# Patient Record
Sex: MALE | Race: WHITE | NOT HISPANIC OR LATINO | Employment: OTHER | URBAN - METROPOLITAN AREA
[De-identification: names, ages, dates, MRNs, and addresses within clinical notes are randomized per-mention and may not be internally consistent; named-entity substitution may affect disease eponyms.]

---

## 2024-03-08 ENCOUNTER — APPOINTMENT (OUTPATIENT)
Dept: MRI IMAGING | Facility: CLINIC | Age: 49
End: 2024-03-08
Attending: PHYSICIAN ASSISTANT
Payer: COMMERCIAL

## 2024-03-08 ENCOUNTER — APPOINTMENT (OUTPATIENT)
Dept: CT IMAGING | Facility: CLINIC | Age: 49
End: 2024-03-08
Attending: EMERGENCY MEDICINE
Payer: COMMERCIAL

## 2024-03-08 ENCOUNTER — APPOINTMENT (OUTPATIENT)
Dept: ULTRASOUND IMAGING | Facility: CLINIC | Age: 49
End: 2024-03-08
Attending: PHYSICIAN ASSISTANT
Payer: COMMERCIAL

## 2024-03-08 ENCOUNTER — HOSPITAL ENCOUNTER (EMERGENCY)
Facility: CLINIC | Age: 49
Discharge: HOME | End: 2024-03-08
Attending: EMERGENCY MEDICINE | Admitting: INTERNAL MEDICINE
Payer: COMMERCIAL

## 2024-03-08 VITALS
RESPIRATION RATE: 18 BRPM | DIASTOLIC BLOOD PRESSURE: 95 MMHG | TEMPERATURE: 97.8 F | HEART RATE: 84 BPM | OXYGEN SATURATION: 96 % | SYSTOLIC BLOOD PRESSURE: 147 MMHG

## 2024-03-08 DIAGNOSIS — E83.42 HYPOMAGNESEMIA: ICD-10-CM

## 2024-03-08 DIAGNOSIS — S06.0X0A CONCUSSION WITHOUT LOSS OF CONSCIOUSNESS, INITIAL ENCOUNTER: ICD-10-CM

## 2024-03-08 DIAGNOSIS — R44.3 HALLUCINATIONS: Primary | ICD-10-CM

## 2024-03-08 DIAGNOSIS — E87.6 HYPOKALEMIA: ICD-10-CM

## 2024-03-08 DIAGNOSIS — F10.11 HISTORY OF ALCOHOL ABUSE: ICD-10-CM

## 2024-03-08 PROBLEM — F43.20 ADJUSTMENT REACTION: Status: ACTIVE | Noted: 2024-03-08

## 2024-03-08 PROBLEM — F10.239 ALCOHOL DEPENDENCE WITH WITHDRAWAL WITH COMPLICATION (H): Status: ACTIVE | Noted: 2024-03-08

## 2024-03-08 LAB
ALBUMIN SERPL BCG-MCNC: 4.4 G/DL (ref 3.5–5.2)
ALBUMIN UR-MCNC: 50 MG/DL
ALP SERPL-CCNC: 143 U/L (ref 40–150)
ALT SERPL W P-5'-P-CCNC: 41 U/L (ref 0–70)
AMMONIA PLAS-SCNC: 43 UMOL/L (ref 16–60)
AMPHETAMINES UR QL SCN: ABNORMAL
ANION GAP SERPL CALCULATED.3IONS-SCNC: 12 MMOL/L (ref 7–15)
ANION GAP SERPL CALCULATED.3IONS-SCNC: 15 MMOL/L (ref 7–15)
APAP SERPL-MCNC: <5 UG/ML (ref 10–30)
APPEARANCE UR: CLEAR
AST SERPL W P-5'-P-CCNC: 145 U/L (ref 0–45)
ATRIAL RATE - MUSE: 97 BPM
BARBITURATES UR QL SCN: ABNORMAL
BASOPHILS # BLD AUTO: 0 10E3/UL (ref 0–0.2)
BASOPHILS NFR BLD AUTO: 1 %
BENZODIAZ UR QL SCN: ABNORMAL
BILIRUB SERPL-MCNC: 3.2 MG/DL
BILIRUB UR QL STRIP: NEGATIVE
BUN SERPL-MCNC: 7.7 MG/DL (ref 6–20)
BUN SERPL-MCNC: 9.8 MG/DL (ref 6–20)
BZE UR QL SCN: ABNORMAL
CALCIUM SERPL-MCNC: 10 MG/DL (ref 8.6–10)
CALCIUM SERPL-MCNC: 9.6 MG/DL (ref 8.6–10)
CANNABINOIDS UR QL SCN: ABNORMAL
CHLORIDE SERPL-SCNC: 89 MMOL/L (ref 98–107)
CHLORIDE SERPL-SCNC: 95 MMOL/L (ref 98–107)
CK SERPL-CCNC: 231 U/L (ref 39–308)
COLOR UR AUTO: YELLOW
CREAT SERPL-MCNC: 0.69 MG/DL (ref 0.67–1.17)
CREAT SERPL-MCNC: 0.7 MG/DL (ref 0.67–1.17)
DEPRECATED HCO3 PLAS-SCNC: 30 MMOL/L (ref 22–29)
DEPRECATED HCO3 PLAS-SCNC: 31 MMOL/L (ref 22–29)
DIASTOLIC BLOOD PRESSURE - MUSE: NORMAL MMHG
EGFRCR SERPLBLD CKD-EPI 2021: >90 ML/MIN/1.73M2
EGFRCR SERPLBLD CKD-EPI 2021: >90 ML/MIN/1.73M2
EOSINOPHIL # BLD AUTO: 0 10E3/UL (ref 0–0.7)
EOSINOPHIL NFR BLD AUTO: 0 %
ERYTHROCYTE [DISTWIDTH] IN BLOOD BY AUTOMATED COUNT: 11.4 % (ref 10–15)
ETHANOL SERPL-MCNC: <0.01 G/DL
FENTANYL UR QL: ABNORMAL
FOLATE SERPL-MCNC: 21.7 NG/ML (ref 4.6–34.8)
GLUCOSE SERPL-MCNC: 120 MG/DL (ref 70–99)
GLUCOSE SERPL-MCNC: 92 MG/DL (ref 70–99)
GLUCOSE UR STRIP-MCNC: NEGATIVE MG/DL
HCT VFR BLD AUTO: 39.6 % (ref 40–53)
HGB BLD-MCNC: 14.3 G/DL (ref 13.3–17.7)
HGB UR QL STRIP: ABNORMAL
IMM GRANULOCYTES # BLD: 0 10E3/UL
IMM GRANULOCYTES NFR BLD: 1 %
INTERPRETATION ECG - MUSE: NORMAL
KETONES UR STRIP-MCNC: NEGATIVE MG/DL
LEUKOCYTE ESTERASE UR QL STRIP: NEGATIVE
LYMPHOCYTES # BLD AUTO: 0.5 10E3/UL (ref 0.8–5.3)
LYMPHOCYTES NFR BLD AUTO: 9 %
MAGNESIUM SERPL-MCNC: 1.5 MG/DL (ref 1.7–2.3)
MAGNESIUM SERPL-MCNC: 1.7 MG/DL (ref 1.7–2.3)
MCH RBC QN AUTO: 36 PG (ref 26.5–33)
MCHC RBC AUTO-ENTMCNC: 36.1 G/DL (ref 31.5–36.5)
MCV RBC AUTO: 100 FL (ref 78–100)
MONOCYTES # BLD AUTO: 0.7 10E3/UL (ref 0–1.3)
MONOCYTES NFR BLD AUTO: 12 %
NEUTROPHILS # BLD AUTO: 4.5 10E3/UL (ref 1.6–8.3)
NEUTROPHILS NFR BLD AUTO: 77 %
NITRATE UR QL: NEGATIVE
NRBC # BLD AUTO: 0 10E3/UL
NRBC BLD AUTO-RTO: 0 /100
OPIATES UR QL SCN: ABNORMAL
P AXIS - MUSE: 37 DEGREES
PCP QUAL URINE (ROCHE): ABNORMAL
PH UR STRIP: 8 [PH] (ref 5–7)
PHOSPHATE SERPL-MCNC: 3.1 MG/DL (ref 2.5–4.5)
PLATELET # BLD AUTO: 137 10E3/UL (ref 150–450)
POTASSIUM SERPL-SCNC: 3 MMOL/L (ref 3.4–5.3)
POTASSIUM SERPL-SCNC: 3.4 MMOL/L (ref 3.4–5.3)
PR INTERVAL - MUSE: 178 MS
PROT SERPL-MCNC: 8.6 G/DL (ref 6.4–8.3)
QRS DURATION - MUSE: 100 MS
QT - MUSE: 404 MS
QTC - MUSE: 513 MS
R AXIS - MUSE: -9 DEGREES
RBC # BLD AUTO: 3.97 10E6/UL (ref 4.4–5.9)
RBC URINE: 3 /HPF
SALICYLATES SERPL-MCNC: <0.3 MG/DL
SODIUM SERPL-SCNC: 134 MMOL/L (ref 135–145)
SODIUM SERPL-SCNC: 138 MMOL/L (ref 135–145)
SP GR UR STRIP: 1.01 (ref 1–1.03)
SYSTOLIC BLOOD PRESSURE - MUSE: NORMAL MMHG
T AXIS - MUSE: 27 DEGREES
T4 FREE SERPL-MCNC: 1.28 NG/DL (ref 0.9–1.7)
TROPONIN T SERPL HS-MCNC: <6 NG/L
TSH SERPL DL<=0.005 MIU/L-ACNC: 5.67 UIU/ML (ref 0.3–4.2)
UROBILINOGEN UR STRIP-MCNC: 2 MG/DL
VENTRICULAR RATE- MUSE: 97 BPM
WBC # BLD AUTO: 5.8 10E3/UL (ref 4–11)
WBC URINE: <1 /HPF

## 2024-03-08 PROCEDURE — 250N000009 HC RX 250: Performed by: EMERGENCY MEDICINE

## 2024-03-08 PROCEDURE — 96375 TX/PRO/DX INJ NEW DRUG ADDON: CPT

## 2024-03-08 PROCEDURE — A9585 GADOBUTROL INJECTION: HCPCS | Performed by: EMERGENCY MEDICINE

## 2024-03-08 PROCEDURE — 250N000011 HC RX IP 250 OP 636: Performed by: PHYSICIAN ASSISTANT

## 2024-03-08 PROCEDURE — 80053 COMPREHEN METABOLIC PANEL: CPT | Performed by: EMERGENCY MEDICINE

## 2024-03-08 PROCEDURE — 36415 COLL VENOUS BLD VENIPUNCTURE: CPT | Performed by: EMERGENCY MEDICINE

## 2024-03-08 PROCEDURE — 82077 ASSAY SPEC XCP UR&BREATH IA: CPT | Performed by: EMERGENCY MEDICINE

## 2024-03-08 PROCEDURE — 96367 TX/PROPH/DG ADDL SEQ IV INF: CPT

## 2024-03-08 PROCEDURE — 84425 ASSAY OF VITAMIN B-1: CPT | Performed by: PHYSICIAN ASSISTANT

## 2024-03-08 PROCEDURE — 96361 HYDRATE IV INFUSION ADD-ON: CPT

## 2024-03-08 PROCEDURE — 80143 DRUG ASSAY ACETAMINOPHEN: CPT | Performed by: EMERGENCY MEDICINE

## 2024-03-08 PROCEDURE — 83735 ASSAY OF MAGNESIUM: CPT | Performed by: EMERGENCY MEDICINE

## 2024-03-08 PROCEDURE — 82550 ASSAY OF CK (CPK): CPT | Performed by: EMERGENCY MEDICINE

## 2024-03-08 PROCEDURE — 258N000003 HC RX IP 258 OP 636: Performed by: PHYSICIAN ASSISTANT

## 2024-03-08 PROCEDURE — 85025 COMPLETE CBC W/AUTO DIFF WBC: CPT | Performed by: EMERGENCY MEDICINE

## 2024-03-08 PROCEDURE — 83735 ASSAY OF MAGNESIUM: CPT | Performed by: PHYSICIAN ASSISTANT

## 2024-03-08 PROCEDURE — 80048 BASIC METABOLIC PNL TOTAL CA: CPT | Performed by: PHYSICIAN ASSISTANT

## 2024-03-08 PROCEDURE — 250N000011 HC RX IP 250 OP 636: Performed by: EMERGENCY MEDICINE

## 2024-03-08 PROCEDURE — 96366 THER/PROPH/DIAG IV INF ADDON: CPT

## 2024-03-08 PROCEDURE — 84443 ASSAY THYROID STIM HORMONE: CPT | Performed by: EMERGENCY MEDICINE

## 2024-03-08 PROCEDURE — 80307 DRUG TEST PRSMV CHEM ANLYZR: CPT | Performed by: EMERGENCY MEDICINE

## 2024-03-08 PROCEDURE — 82607 VITAMIN B-12: CPT | Performed by: PHYSICIAN ASSISTANT

## 2024-03-08 PROCEDURE — 80179 DRUG ASSAY SALICYLATE: CPT | Performed by: EMERGENCY MEDICINE

## 2024-03-08 PROCEDURE — 255N000002 HC RX 255 OP 636: Performed by: EMERGENCY MEDICINE

## 2024-03-08 PROCEDURE — 76705 ECHO EXAM OF ABDOMEN: CPT

## 2024-03-08 PROCEDURE — 99223 1ST HOSP IP/OBS HIGH 75: CPT | Mod: AI | Performed by: PHYSICIAN ASSISTANT

## 2024-03-08 PROCEDURE — 96365 THER/PROPH/DIAG IV INF INIT: CPT | Mod: 59

## 2024-03-08 PROCEDURE — 99285 EMERGENCY DEPT VISIT HI MDM: CPT | Mod: 25

## 2024-03-08 PROCEDURE — 84439 ASSAY OF FREE THYROXINE: CPT | Performed by: EMERGENCY MEDICINE

## 2024-03-08 PROCEDURE — 93005 ELECTROCARDIOGRAM TRACING: CPT | Mod: RTG

## 2024-03-08 PROCEDURE — 250N000013 HC RX MED GY IP 250 OP 250 PS 637: Performed by: EMERGENCY MEDICINE

## 2024-03-08 PROCEDURE — 70450 CT HEAD/BRAIN W/O DYE: CPT

## 2024-03-08 PROCEDURE — 70553 MRI BRAIN STEM W/O & W/DYE: CPT

## 2024-03-08 PROCEDURE — 81001 URINALYSIS AUTO W/SCOPE: CPT | Performed by: EMERGENCY MEDICINE

## 2024-03-08 PROCEDURE — 258N000003 HC RX IP 258 OP 636: Performed by: EMERGENCY MEDICINE

## 2024-03-08 PROCEDURE — 84100 ASSAY OF PHOSPHORUS: CPT | Performed by: PHYSICIAN ASSISTANT

## 2024-03-08 PROCEDURE — 82746 ASSAY OF FOLIC ACID SERUM: CPT | Performed by: PHYSICIAN ASSISTANT

## 2024-03-08 PROCEDURE — 82140 ASSAY OF AMMONIA: CPT | Performed by: PHYSICIAN ASSISTANT

## 2024-03-08 PROCEDURE — 36415 COLL VENOUS BLD VENIPUNCTURE: CPT | Performed by: PHYSICIAN ASSISTANT

## 2024-03-08 PROCEDURE — 84484 ASSAY OF TROPONIN QUANT: CPT | Performed by: EMERGENCY MEDICINE

## 2024-03-08 RX ORDER — THIAMINE HYDROCHLORIDE 100 MG/ML
100 INJECTION, SOLUTION INTRAMUSCULAR; INTRAVENOUS ONCE
Status: COMPLETED | OUTPATIENT
Start: 2024-03-08 | End: 2024-03-08

## 2024-03-08 RX ORDER — GADOBUTROL 604.72 MG/ML
7.5 INJECTION INTRAVENOUS ONCE
Status: COMPLETED | OUTPATIENT
Start: 2024-03-08 | End: 2024-03-08

## 2024-03-08 RX ORDER — MAGNESIUM SULFATE HEPTAHYDRATE 40 MG/ML
2 INJECTION, SOLUTION INTRAVENOUS ONCE
Status: COMPLETED | OUTPATIENT
Start: 2024-03-08 | End: 2024-03-08

## 2024-03-08 RX ORDER — POTASSIUM CHLORIDE 1.5 G/1.58G
40 POWDER, FOR SOLUTION ORAL ONCE
Status: COMPLETED | OUTPATIENT
Start: 2024-03-08 | End: 2024-03-08

## 2024-03-08 RX ADMIN — POTASSIUM CHLORIDE 40 MEQ: 1.5 POWDER, FOR SOLUTION ORAL at 09:38

## 2024-03-08 RX ADMIN — SODIUM CHLORIDE 1000 ML: 9 INJECTION, SOLUTION INTRAVENOUS at 18:00

## 2024-03-08 RX ADMIN — FOLIC ACID: 5 INJECTION, SOLUTION INTRAMUSCULAR; INTRAVENOUS; SUBCUTANEOUS at 08:52

## 2024-03-08 RX ADMIN — MAGNESIUM SULFATE HEPTAHYDRATE 2 G: 2 INJECTION, SOLUTION INTRAVENOUS at 09:38

## 2024-03-08 RX ADMIN — GADOBUTROL 7.5 ML: 604.72 INJECTION INTRAVENOUS at 17:02

## 2024-03-08 RX ADMIN — THIAMINE HYDROCHLORIDE 100 MG: 100 INJECTION, SOLUTION INTRAMUSCULAR; INTRAVENOUS at 16:04

## 2024-03-08 ASSESSMENT — ACTIVITIES OF DAILY LIVING (ADL)
ADLS_ACUITY_SCORE: 35
ADLS_ACUITY_SCORE: 33
ADLS_ACUITY_SCORE: 35
ADLS_ACUITY_SCORE: 33
ADLS_ACUITY_SCORE: 35

## 2024-03-08 ASSESSMENT — ENCOUNTER SYMPTOMS
BACK PAIN: 0
DYSURIA: 0
RHINORRHEA: 0
HALLUCINATIONS: 1
NECK PAIN: 0
NUMBNESS: 0
PHOTOPHOBIA: 0
CHILLS: 0
DIZZINESS: 0
WEAKNESS: 0
VOMITING: 0
CONFUSION: 1
COUGH: 0
FEVER: 0
HEADACHES: 0
NAUSEA: 0
ABDOMINAL PAIN: 0
HEMATURIA: 0
SHORTNESS OF BREATH: 0
NECK STIFFNESS: 0

## 2024-03-08 NOTE — CONSULTS
Diagnostic Evaluation Consultation  Crisis Assessment    Patient Name: Carlos A Lopez  Age:  48 year old  Legal Sex: male  Gender Identity: male  Pronouns: he/him  Race: White  Ethnicity: Not  or   Language: English      Patient was assessed: Virtual: [employer] Crisis Assessment Start Time: 1313 Crisis Assessment Stop Time: 1353  Patient location: [facility] EMERGENCY DEPT                             ED05    Referral Data and Chief Complaint  Carlos A Lopez presents to the ED with family/friends. Patient is presenting to the ED for the following concerns:  (hallucinations following stopping alcohol).       Factors that make the mental health crisis life threatening or complex are:  The patient reports that he stopped drinking a few days ago. He reports drinking a liter of alcohol every few days for the past many years. Also, a few days ago, he reports falling while moving a  and hitting his head. Over the past couple of days he's experienced hallucinations of seeing and feeling water that wasn't there, seeing people that weren't there, hearing things that weren't there. He's had trouble sleeping, had sweating, shaking, and trouble with memory.       Currently, the patient is alert and mostly oriented. He is calm and engaged in assessment. He is having some challenges with memory. He is somewhat tangential as he adds information unrelated to the questions asked. He is still experiencing visual halluncations. He states he has had some trouble with his memory in the past few months.    Informed Consent and Assessment Methods  Explained the crisis assessment process, including applicable information disclosures and limits to confidentiality, assessed understanding of the process, and obtained consent to proceed with the assessment.  Assessment methods included conducting a formal interview with patient, review of medical records, collaboration with medical staff, and obtaining  relevant collateral information from family and community providers when available: done     Patient response to interventions: acceptance expressed  Coping skills were attempted to reduce the crisis: came to ED     History of the Crisis   Patient s wife reports the patient has no history of mental illness. Over the past few months she has noticed trouble with his short term memory, repeating stories, asking more questions, more fatigue, more sleeping, very little appetite, and weight loss.     Brief Psychosocial History  Family:  , Children yes (ages 16 and 18)  Support System:  Wife  Employment Status:  employed full-time  Source of Income:  salary/wages  Financial Environmental Concerns:  none  Current Hobbies:  family functions, sports/team sports  Barriers in Personal Life: alcohol use     Significant Clinical History  Current Anxiety Symptoms:  anxious  Current Depression/Trauma:     Current Somatic Symptoms:  sweating, flushing, shaking, anxious  Current Psychosis/Thought Disturbance:     Current Eating Symptoms:  loss of appetite  Chemical Use History:  Alcohol: Daily  Last Use:: 03/05/24  Benzodiazepines: None  Opiates: None  Cocaine: None  Marijuana: Occasional  Last Use:: 03/06/24  Other Use: None  Withdrawal Symptoms: Tremors   Past diagnosis:  No known past diagnosis  Family history:  No known history of mental health or chemical health concerns  Past treatment:  No known formal treatment attempts  Details of most recent treatment:  No past mental or substance use health treatment     Collateral Information  Is there collateral information: Yes     Collateral information name, relationship, phone number:  Spoke with patient's wife, Nelda, [phone], with patient present. Patient s wife reports the patient has no history of mental illness. Over the past few months she has noticed trouble with his short term memory, repeating stories, asking more questions, more fatigue, more sleeping, very  little appetite, and weight loss. Wife states patient has no history of harm to himself or others.    Risk Assessment  Harrison Suicide Severity Rating Scale Full Clinical Version:  Suicidal Ideation  Q1 Wish to be Dead (Lifetime): No  Q2 Non-Specific Active Suicidal Thoughts (Lifetime): No  Q6 Suicide Behavior (Lifetime): no     Suicidal Behavior (Lifetime)  Actual Attempt (Lifetime): No    Harrison Suicide Severity Rating Scale Recent:   Suicidal Ideation (Recent)  Q1 Wished to be Dead (Past Month): no  Q2 Suicidal Thoughts (Past Month): no  Level of Risk per Screen: no risks indicated     Suicidal Behavior (Recent)  Actual Attempt (Past 3 Months): No    Environmental or Psychosocial Events: ongoing abuse of substances  Protective Factors: Protective Factors: strong bond to family unit, community support, or employment, intact marriage or domestic partnership, responsibilities and duties to others, including pets and children, lives in a responsibly safe and stable environment    Does the patient have thoughts of harming others? Feels Like Hurting Others: no  Previous Attempt to Hurt Others: no  Is the patient engaging in sexually inappropriate behavior?: no    Is the patient engaging in sexually inappropriate behavior?  no        Mental Status Exam   Affect: Appropriate  Appearance: Appropriate  Attention Span/Concentration: Attentive  Eye Contact: Engaged    Fund of Knowledge: Appropriate   Language /Speech Content: Fluent  Language /Speech Volume: Normal  Language /Speech Rate/Productions: Normal  Recent Memory: Variable  Remote Memory: Variable  Mood: Normal  Orientation to Person: Yes   Orientation to Place: Yes  Orientation to Time of Day: Yes  Orientation to Date: Yes     Situation (Do they understand why they are here?): Yes  Psychomotor Behavior: Normal  Thought Content: Hallucinations  Thought Form: Tangential     Medication  Psychotropic medications: none     Current Care Team  Patient Care Team:  No  Ref-Primary, Physician as PCP - General    Diagnosis  Patient Active Problem List   Diagnosis Code    Alcohol dependence with withdrawal with complication (H) F10.239    Adjustment reaction F43.20       Primary Problem This Admission  Active Hospital Problems    *F32.20 Adjustment reaction     F10.239 Alcohol dependence with withdrawal with complication (H)      Clinical Summary and Substantiation of Recommendations   Patient s wife reports the patient has no history of mental illness or cognitive issues and over the past few months she has noticed trouble with his short term memory, repeating stories, asking more questions, more fatigue, more sleeping, very little appetite, and weight loss. The patient stopped drinking a few days ago (was drinking a liter of hard alcohol over a few days). In the past couple days he has been experiencing hallucinations, trouble sleeping, and cognitive problems. He is currently tangential and answering questions somewhat and then adding information that is not relevant to the question asked. MD recommends that patient be medically admitted for withdrawal and cognitive evaluation. Patient was also scheduled with a psychiatrist on 12 March 2024 for medication management and substance use treatment.      Patient coping skills attempted to reduce the crisis:   came to ED    Disposition  Recommended disposition: Medical Admission        Reviewed case and recommendations with attending provider. Attending Name: MD Lavell       Attending concurs with disposition: yes       Patient and/or validated legal guardian concurs with disposition: yes       Final disposition:  medical    Assessment Details   Total duration spent with the patient: 40 min     CPT code(s) utilized: 80603 - Psychotherapy for Crisis - 60 (30-74*) min    PALMIRA Long   [facility] - Triage & Transition Services   Callback: [phone]

## 2024-03-08 NOTE — ED TRIAGE NOTES
Pt to ER with c/o altered mental status, pt has had some edibles last night but also may have hit his head a coiuple of days ago, wife states that pt is hearing voices

## 2024-03-08 NOTE — CONSULTS
[facility]  Internal Medicine Consult   NAME: Carlos A Lopez   : 1975   MRN: [id number]     Date of Admission:  3/8/2024    Assessment & Plan   Carlos A Lopez is a 48 year old male without previous hx of mental health, hx of ETOH dependence vs abuse, hx of colon ca s/p chemo and resection and mild asthma who presents with wife due to concerns of confusion, paranoia, with visual and auditory hallucinations for the past 24-36 hrs.   Pt states he drinks about 15 drinks a week though wife thinks its more. Does have a hx of DUI 3 years ago. States last etoh drink was sun night. On wed he was getting a  out of his trunk when he lost balance and fell on his buttock then hitting his head on the driveway from the flat bed of the truck. He had a bit of HA but o/w no other sxs. He did take THC gummies on wed and thurs night for sleep. He didn't know what kind he took wed but states last night he took a 5mg THC gummi. Yesterday, wife states he called her b/c he believe that the ceiling was going to come down due to water leak when there was not a leak, then he saw people behind the curtain and heard them talking when no one was there. Due to on-going confusion his wife brought him to the ED.      Initial work up in the ED shows normal CT head, labs showed hypokalemia and hyponatremia and hypomagnesemia. LFTs were elevated in pattern c/w chronic etoh use. Ammonia level was negative. Normal TSH. CBC unremarkable.   Urine was concentrated but no obvious sxs of infection. Observation admission was requested but pt was very much reluctant to stay. We talked about potential possibilities as the cause of his hallucination and even paranoid thinking. I told him that I would consider checking ammonia, B12/folate/thiamine, get an MRI of the brain and RUQ US given LFT abnormality. I told him that I could try to accomplish this and if all is negative or nothing too alarming that I will let him go home.  He was agreeable with that plan.     #Acute episodes of visual and auditory hallucinations  #Episodes of delusion/paranoia   Pt and family denies hx of MH issues but father did have hx of ETOH abuse.   So far work up has reassuring. Only mild electrolyte derangements but currently not replaced.  No significant infectious etiology and negative CT head imaging as well as MRI. I suspect his sxs are due to recent etoh w/d and perhaps dTs combined with recent closed head injury from fall and +/- effects of  THC.   -  Discussed results of labs and emphasized need for etoh reduction/cessation and not using any substance during this period.   -  [facility] also [facility] as well as a PCP follow up in 1 week and can assess if Neurology referral was needed.     #ETOH dependence vs abuse  #Abnl LFTs   #Nodular liver concerning for cirrhosis   -  Pt denies any hx of w/d and w/d seizures or dT. Father has hx of ETOH abuse.   -  Did get a DUI 3 yrs ago  - Labs babar hepatic panel c/w chronic etoh use  - fortunately ammonia level normal so r/o'd hepatic encephalopathy   - US of the liver shows  nodular appearing c/w cirrhosis   - D/w pt, and he needs absolute etoh cessation/reduction   - Pt and wife will follow up     #Closed head injury   -  Fell off the back of the bed of the truck when carrying a . States first landed on his buttock then hit the back of his head. This was Wed during the day  -  C/o mild HA since and I suspect his has concussive sxs that contributed to his derlirium  -  MRi Ct brain all negative  - Referral to [facility] up    # Hypokalemia: Lowest K = 3 mmol/L in last 2 days, will replace as needed     # Hypomagnesemia: Lowest Mg = 1.5 mg/dL in last 2 days, will replace as needed                # Financial/Environmental Concerns: none            Expected Discharge Date: 03/09/2024               DERICK MckeonC    Primary Care Physician   Physician No  Ref-Primary    Chief Complaint   Hallucination     History is obtained from the patient and wife     Discussed with Dr. Monte in the ED, full chart review including lab work, imaging, and vital signs were reviewed.     History of Present Illness   Carlos A Lopez is a 48 year old male without previous hx of mental health, hx of ETOH dependence vs abuse, hx of colon ca s/p chemo and resection and mild asthma who presents with wife due to concerns of confusion, paranoia, with visual and auditory hallucinations for the past 24-36 hrs.   Pt states he drinks about 15 drinks a week though wife thinks its more. Does have a hx of DUI 3 years ago. States last etoh drink was sun night. On wed he was getting a  out of his trunk when he lost balance and fell on his buttock then hitting his head on the driveway from the flat bed of the truck. He had a bit of HA but o/w no other sxs. He did take THC gummies on wed and thurs night for sleep. He didn't know what kind he took wed but states last night he took a 5mg THC gummi. Yesterday, wife states he called her b/c he believe that the ceiling was going to come down due to water leak when there was not a leak, then he saw people behind the curtain and heard them talking when no one was there. Due to on-going confusion his wife brought him to the ED.      Initial work up in the ED shows normal CT head, labs showed hypokalemia and hyponatremia and hypomagnesemia. LFTs were elevated in pattern c/w chronic etoh use. Ammonia level was negative. Normal TSH. CBC unremarkable.   Urine was concentrated but no obvious sxs of infection. Observation admission was requested but pt was very much reluctant to stay. We talked about potential possibilities as the cause of his hallucination and even paranoid thinking. I told him that I would consider checking ammonia, B12/folate/thiamine, get an MRI of the brain and RUQ US given LFT abnormality. I told him that I could try to accomplish  this and if all is negative or nothing too alarming that I will let him go home. He was agreeable with that plan.     Past Medical History    I have reviewed this patient's medical history and updated it with pertinent information if needed.     Past Surgical History   I have reviewed this patient's surgical history and updated it with pertinent information if needed.    Prior to Admission Medications   None     Allergies   No Known Allergies    Social History   I have reviewed this patient's social history and updated it with pertinent information if needed. Carlos A GREGORY Lopez      Family History   I have reviewed this patient's family history and updated it with pertinent information if needed.     Review of Systems   The 10 point Review of Systems is negative other than noted in the HPI or here.     Physical Exam   Temp: 97.8  F (36.6  C) Temp src: Temporal BP: (!) 162/98 Pulse: 89   Resp: 18 SpO2: 95 % O2 Device: None (Room air)    Vital Signs with Ranges  Temp:  [97.8  F (36.6  C)] 97.8  F (36.6  C)  Pulse:  [] 89  Resp:  [18] 18  BP: (162-170)/() 162/98  SpO2:  [95 %] 95 %  0 lbs 0 oz    Constitutional: Awake, alert,  no apparent distress. AOx4, paranoid thinking that wife is tricking him  Eyes: Conjunctiva and pupils examined and normal.  HEENT: Moist mucous membranes, normal dentition.  Respiratory: Clear to auscultation bilaterally, no crackles or wheezing.  Cardiovascular: Regular rate and rhythm, normal S1 and S2, and no murmur noted.  GI: Soft, non-distended, non-tender, bowel sounds present. No rebound tenderness or guarding.  Lymph/Hematologic: No anterior cervical or supraclavicular adenopathy.  Skin: No rashes, no cyanosis, no edema.  Musculoskeletal: No deformities noted.  No erythema or tenderness. Moving all extremities.  Neurologic: No focal deficits noted. Speech is clear. Coordination and strength grossly normal.   Psychiatric: Appropriate affect.    Data   Data reviewed today:       EKG: NSR  Imaging:   Recent Results (from the past 24 hour(s))   CT Head w/o Contrast    Narrative    EXAM: CT HEAD W/O CONTRAST  3/8/2024 9:36 AM     HISTORY:  fall, altered mental status       COMPARISON:  No prior similar studies    TECHNIQUE: Using multidetector thin collimation helical acquisition  technique, axial, coronal and sagittal CT images from the skull base  to the vertex were obtained without intravenous contrast.   (topogram) image(s) also obtained and reviewed. Dose reduction  techniques were performed.    FINDINGS:  No intracranial hemorrhage, mass effect, or midline shift. No acute  loss of gray-white matter differentiation in the cerebral hemispheres.  Ventricles are proportionate to the cerebral sulci. Clear basal  cisterns. Mild diffuse cerebral volume loss.    The bony calvaria and the bones of the skull base are normal. The  visualized portions of the paranasal sinuses and mastoid air cells are  clear. Grossly normal orbits.       Impression    IMPRESSION: No acute intracranial pathology.     BJORN MORE MD         SYSTEM ID:  [id number]       Recent Labs   Lab 03/08/24  1611 03/08/24  0758   WBC  --  5.8   HGB  --  14.3   MCV  --  100   PLT  --  137*    134*   POTASSIUM 3.4 3.0*   CHLORIDE 95* 89*   CO2 31* 30*   BUN 7.7 9.8   CR 0.69 0.70   ANIONGAP 12 15   RACIEL 9.6 10.0   GLC 92 120*   ALBUMIN  --  4.4   PROTTOTAL  --  8.6*   BILITOTAL  --  3.2*   ALKPHOS  --  143   ALT  --  41   AST  --  145*           Alaina Chan PA-C  [facility] Medicine  March 8, 2024  Securely message with the Vocera Web Console (learn more here)  Text page via [facility] Paging/Directory

## 2024-03-08 NOTE — ED NOTES
RN ED Mental Health Handoff Note    Voluntary    Does patient require 1:1? No    Hold and rights been given and documented for patient: No    Is the patient in BH scrubs? No -Street clothes    Has the patient been searched? Yes    Is the 15 minute observation tool up to date? No    Was patient issued a welcome folder? Yes    Room check completed this shift: Yes    PSS3 and [place] Assessment/Reassessment this shift:    C-SSRS ([place])      Date and Time Q1 Wished to be Dead (Past Month) Q2 Suicidal Thoughts (Past Month) Q3 Suicidal Thought Method Q4 Suicidal Intent without Specific Plan Q5 Suicide Intent with Specific Plan Q6 Suicide Behavior (Lifetime) Within the Past 3 Months? Level of Risk per Screen Level of Risk per Screen User   03/08/24 1413 0-->no 0-->no -- -- -- 0-->no -- -- no risks indicated JH   03/08/24 0909 0-->no 0-->no -- -- -- 0-->no -- -- no risks indicated JMT            Behavioral status of patient: Green    Code 21 called this shift? No    Use of restraints/seclusion this shift? No    Most recent vital signs:  Temp: 97.8  F (36.6  C) Temp src: Temporal BP: (!) 162/98 Pulse: 89   Resp: 18 SpO2: 95 % O2 Device: None (Room air)      Medications:  Scheduled medication compliance? None    PRN Meds administered this shift? No    Medications   sodium chloride 0.9 % 1,000 mL with Infuvite Adult 10 mL, thiamine 100 mg, folic acid 1 mg infusion ( Intravenous Stopped 3/8/24 1121)   potassium chloride (KLOR-CON) Packet 40 mEq (40 mEq Oral $Given 3/8/24 0938)   magnesium sulfate 2 g in 50 mL sterile water intermittent infusion (0 g Intravenous Stopped 3/8/24 1011)         ADLs    Meal Provided this shift? No    Hygiene items provided? No    ADLs completed? No    Date of last shower: Unknown    Any significant events this shift? Patient had [facility] evaluation.     Any information that would be helpful in caring for this patient?  None    Family present/updated? Yes    Location of patient's belongings: With  patient    Critical Care Minutes:  Does the patient need critical care minutes documented? No

## 2024-03-08 NOTE — ED PROVIDER NOTES
History     Chief Complaint:  Altered Mental Status       HPI   Carlos A Lopez is a 48 year old male who presents with wife for evaluation of altered mental status. The patient states two days ago he was offloading a dryer with his brother and fell backwards off the back of his truck and hit his head and back on the ground. No loss of consciousness, nausea/vomiting, dizziness, or blood thinner usage. Later that night he reports one episode of headache and lightheadedness which has now resolved. He adds that he has been experiencing morning bilateral leg pain and wife notes repetitive behavior for the past two days. Leg pains have resolved. Baseline history of neuropathy in this fingers and toes. The patient reports that he normally drinks regularly having 2-3 shots of liquor, but has recently increased to 3-4 shots due to stress. However, he recently stopped having alcohol five days ago to prepare for a doctor's appointment and has been experiencing some symptoms of withdrawal maybe a few days ago on Tuesday. The patient reports that he has been taking marijuana edibles for the past three days to help with sleep. He has never had hallucinations or similar effect from usage in the past, but he does not regularly use edibles. Wife states that at 0400 this morning the patient believes he heard fourteen people speaking from the other room as well as while in the ER hallucination of his son sitting right beside him when he wasn't. Patient endorses both visual and auditory hallucinations and reports seeing figures in his peripheral vision but they disappear when he looks straight at them. Per wife, son reports patient having visual hallucinations of a man staring at him last night while they were watching TV. Patient continues to have visual and auditory hallucinations while in the ER. Denies chest pain, shortness of breath, abdominal pain, fever, nausea, vomiting, seizures, blood or pain when urinating, difficult  bowl movements, black or bloody stool, diarrhea, and thoughts of hurting himself and others. Denies history of mental illness and taking blood thinners.    Review of Systems   Constitutional:  Negative for chills and fever.   HENT:  Negative for congestion and rhinorrhea.    Eyes:  Negative for photophobia and visual disturbance.   Respiratory:  Negative for cough and shortness of breath.    Cardiovascular:  Negative for chest pain.   Gastrointestinal:  Negative for abdominal pain, nausea and vomiting.   Genitourinary:  Negative for dysuria and hematuria.   Musculoskeletal:  Negative for back pain, neck pain and neck stiffness.   Neurological:  Negative for dizziness, weakness, numbness and headaches.   Psychiatric/Behavioral:  Positive for behavioral problems, confusion and hallucinations. Negative for suicidal ideas.        Independent Historian:   Patient and Spouse/Partner - They report as noted above    Review of External Notes:   5/19/22 Telemedicine IM visit note       Medications:    Daily vitamin    Past Medical History:    History of heavy alcohol use  Hypertension  Colon Cancer    Past Surgical History:    Hemicolectomy     Physical Exam   Patient Vitals for the past 24 hrs:   BP Temp Temp src Pulse Resp SpO2   03/08/24 0806 (!) 162/98 -- -- 89 -- --   03/08/24 0636 (!) 170/110 97.8  F (36.6  C) Temporal 104 18 95 %        Constitutional:       General: Not in acute distress.     Appearance: Normal appearance  HENT:      Head: Normocephalic and atraumatic.   Eyes:     Extraocular Movements: Extraocular movements intact.      Conjunctiva/sclera: Conjunctivae normal.      Pupils: Pupils are equal, round, and reactive to light.   Cardiovascular:     Rate and Rhythm: Normal rate and regular rhythm.   Pulmonary:      Effort: Pulmonary effort is normal.      Breath sounds: Normal breath sounds.   Abdominal:      General: Abdomen is flat. There is no distension.      Palpations: Abdomen is soft.      Tenderness:  There is no abdominal tenderness.   Musculoskeletal:      Cervical back: Normal range of motion and neck supple. No rigidity.  No midline cervical spine tenderness to palpation.     General: No swelling or deformity.  No midline T or L-spine tenderness to palpation.  Skin:     General: Skin is warm and dry.   Neurological:      General: No focal deficit present.     NIHSS: Patient alert and keenly responsive. Able to answer month and age. Able and blink eyes and squeeze hands. No gaze palsy. No visual field deficits. No facial palsy. No arm drift bilaterally. No leg drift bilaterally. No limb ataxia. Able to complete finger nose finger and heel to shin. No sensory deficits. No language deficits/aphasia. No dysarthria. No extinction/inattention.     NIHSS score of 0.       Mental Status: Alert and oriented to person, place, and time.   Psychiatric:         Mood and Affect: Mood normal.         Behavior: Behavior normal.     Emergency Department Course   ECG  ECG taken at 0812, ECG read at 0821  Normal sinus rhythm   Rate 97 bpm. PA interval 178 ms. QRS duration 100 ms. QT/QTc 404/513 ms. P-R-T axes 37 -9 27.     See ED course for independent interpretation.     Imaging:  CT Head w/o Contrast   Final Result   IMPRESSION: No acute intracranial pathology.       BJORN MORE MD            SYSTEM ID:  [id number]         Report per radiology    Laboratory:  Labs Ordered and Resulted from Time of ED Arrival to Time of ED Departure   COMPREHENSIVE METABOLIC PANEL - Abnormal       Result Value    Sodium 134 (*)     Potassium 3.0 (*)     Carbon Dioxide (CO2) 30 (*)     Anion Gap 15      Urea Nitrogen 9.8      Creatinine 0.70      GFR Estimate >90      Calcium 10.0      Chloride 89 (*)     Glucose 120 (*)     Alkaline Phosphatase 143       (*)     ALT 41      Protein Total 8.6 (*)     Albumin 4.4      Bilirubin Total 3.2 (*)    MAGNESIUM - Abnormal    Magnesium 1.5 (*)    TSH WITH FREE T4 REFLEX - Abnormal    TSH 5.67  (*)    ROUTINE UA WITH MICROSCOPIC REFLEX TO CULTURE - Abnormal    Color Urine Yellow      Appearance Urine Clear      Glucose Urine Negative      Bilirubin Urine Negative      Ketones Urine Negative      Specific Gravity Urine 1.015      Blood Urine Trace (*)     pH Urine 8.0 (*)     Protein Albumin Urine 50 (*)     Urobilinogen Urine 2.0      Nitrite Urine Negative      Leukocyte Esterase Urine Negative      RBC Urine 3 (*)     WBC Urine <1     ACETAMINOPHEN LEVEL - Abnormal    Acetaminophen <5.0 (*)    CBC WITH PLATELETS AND DIFFERENTIAL - Abnormal    WBC Count 5.8      RBC Count 3.97 (*)     Hemoglobin 14.3      Hematocrit 39.6 (*)           MCH 36.0 (*)     MCHC 36.1      RDW 11.4      Platelet Count 137 (*)     % Neutrophils 77      % Lymphocytes 9      % Monocytes 12      % Eosinophils 0      % Basophils 1      % Immature Granulocytes 1      NRBCs per 100 WBC 0      Absolute Neutrophils 4.5      Absolute Lymphocytes 0.5 (*)     Absolute Monocytes 0.7      Absolute Eosinophils 0.0      Absolute Basophils 0.0      Absolute Immature Granulocytes 0.0      Absolute NRBCs 0.0     URINE DRUG SCREEN PANEL - Abnormal    Amphetamines Urine Screen Negative      Barbituates Urine Screen Negative      Benzodiazepine Urine Screen Negative      Cannabinoids Urine Screen Positive (*)     Cocaine Urine Screen Negative      Fentanyl Qual Urine Screen Negative      Opiates Urine Screen Negative      PCP Urine Screen Negative     TROPONIN T, HIGH SENSITIVITY - Normal    Troponin T, High Sensitivity <6     ETHYL ALCOHOL LEVEL - Normal    Alcohol ethyl <0.01     SALICYLATE LEVEL - Normal    Salicylate <0.3     CK TOTAL - Normal         T4 FREE - Normal    Free T4 1.28          Emergency Department Course & Assessments:       Interventions:  Medications   sodium chloride 0.9 % 1,000 mL with Infuvite Adult 10 mL, thiamine 100 mg, folic acid 1 mg infusion ( Intravenous Stopped 3/8/24 1121)   potassium chloride (KLOR-CON)  Packet 40 mEq (40 mEq Oral $Given 3/8/24 0938)   magnesium sulfate 2 g in 50 mL sterile water intermittent infusion (0 g Intravenous Stopped 3/8/24 1011)        Independent Interpretation (X-rays, CTs, rhythm strip):  See ED course below    Assessments/Consultations/Discussion of Management or Tests:  ED Course as of 03/08/24 1813   Fri Mar 08, 2024   0730 I obtained history and performed physical exam as noted above.    0821 EKG 12-lead, tracing only  Normal sinus rhythm.  Rate of 97.  Normal ME and QRS.  Normal QTc.  No acute ST elevation or depression.  No prior ECG for comparison.   0838 WBC: 5.8  Re-assuring   0911 Potassium(!): 3.0  Repletion ordered   0911 Magnesium(!): 1.5  Repletion ordered   0911 AST(!): 145  Likely secondary to alcohol use   0912 Alcohol ethyl: <0.01   0944 Cannabinoids Qual Urine(!): Screen Positive   1006 On my reevaluation, wife reports that patient is not back to baseline and still having hallucinations.    1037 I spoke with hospitalist KATYA Hobson.  They are refusing admission at this time as they believe this is primarily psychiatric issue since patient is only having auditory hallucinations.  Will consult DEC for psychiatric assessment   1044 On my reevaluation of the patient.  Wife and patient admits to having visual hallucinations out of the corner of his eye.  Patient son reported to mother that last night while they are watching TV, patient was endorsing seeing people standing at the side staring at him, but when he turns towards them, they disappear.   1126 Updated hospitalist KATYA Hobson again regarding patient's updated report of having visual type hallucinations.  Hospitalist services again refusing admission at this time.  Hospitalist service feels that this is most likely secondary to substance use and does not feel like this is related to withdrawal/delirium tremens/Warnicke's-Korsakoff and request continuing down psychiatric evaluation pathway and discharge if  deemed safe per psychiatry.  I will update the patient.   1253 On reevaluation, patient still endorsing visual hallucinations in terms of seeing a hat and also a bug crawling on the wall.  Pending DEC assessment.  Will continue to observe in the ER due to multiple hospitalist refusal.   1401 Hans with DEC evaluated the patient and does not believe psychiatric in nature especially given lack of prior psych history. She has made outpatient psychiatry appointment for the patient if hospitalist deems patient safe for discharge.   1423 Updated hospitalist KATYA Hobson regarding conversation with DEC. She will sign out to her colleague and have her colleague call me back for admission instead.   1519 Updated by [facility] that hospitalist service Alaina Chan PA-C who will accept patient for Dr. Galeana.   1549 Hospitalist PA evaluated the patient. Patient wants to leave, but there is question as to whether or not patient can sign-out against medical advise given concern for decision making capacity. Hospitalist PA request for admission to be held off at this time pending her further evaluation, but she will place additional lab orders and imaging and continue work up while patient in the ED and update on final disposition. Hospitalist service reports they will take point on remainder of patient's care while in the ED and update on final disposition.       Social Determinants of Health affecting care:   None    Disposition:  Care of the patient was transferred to my colleague Dr. Robin pending hospitalist evaluation and final disposition.     Impression & Plan        Medical Decision Makin-year-old male as described above presents to the emergency department for auditory hallucinations and behavioral changes.  Patient hemodynamically stable at time of evaluation.  Afebrile.  At time of my evaluation, alert and oriented x 4.  Otherwise normal neurological examination. Patient does endorse that he has some subtle  thought process issues such as accidentally confusing recent event of carrying a dryer off of his truck versus the television.  Patient also acknowledged that he had auditory hallucinations today.  Nonetheless, patient also endorses that he has been taking marijuana Gummies in the past few days which has been concurrent with his auditory hallucinations as he has been taking these to assist with sleeping.  Last dose was around 2000 last night.  Patient also has history of alcohol withdrawal and endorses that he was having some symptoms slightly around Tuesday.  Nonetheless, at time of evaluation, patient has no tremors or diaphoresis.  Mild tachycardia, and hypertension.  Delirium tremens remains on the differential, but patient does not appear to have other physical findings suggestive of acute withdrawal.  IV banana bag ordered.  Will continue to observe in the ED.  CT head ordered for evaluation for acute intracranial pathologies especially given recent fall and head injury.  History of colon cancer multiple years ago, will also screen for intracranial mass.  Altered mental status workup.  Urine toxicology screening.  Alcohol level.  If patient still having confusion or not back to baseline in mentation, may consider observation admission for general neurology evaluation.  No prior psychiatric history or prior history of hallucinations.  No history of seizures.  Denies any SI, HI.  Discussed care plan with patient and wife at bedside who voiced understanding and agreement with plan.  Answered all questions.  Additional workup and orders as listed in chart.    Please refer to ED course above as part of continuation of MDM for details on the patient's treatment course and any changes or updates in care plan beyond my initial evaluation and MDM creation.      Diagnosis:    ICD-10-CM    1. Hallucinations  R44.3       2. History of alcohol abuse  F10.11       3. Hypomagnesemia  E83.42       4. Hypokalemia  E87.6             Discharge Medications:  New Prescriptions    No medications on file          Scribe Disclosure:  I, Aracelis Padron, am serving as a scribe at 7:34 AM on 3/8/2024 to document services personally performed by Vel Monte DO based on my observations and the provider's statements to me.     3/8/2024   Vel Monte DO Yeh, Ferris, DO  03/08/24 181

## 2024-03-08 NOTE — DISCHARGE INSTRUCTIONS
Reason for Visit:  You were brought to the emergency room due to concerns for new onset auditory and visual hallucination.  This is in the setting of likely alcohol withdrawal and recent head trauma from your fall.  I suspect the hallucination is related to those 2 stressors on your brain.  Your CT scan of the brain and MRI were unremarkable for acute process.  Your electrolytes were abnormal likely due to poor oral intake and ongoing alcohol use but they have since been replaced.  You will be referred to the concussion center.  But I suspect overall your hallucination should improve with time.  Follow-up with your PCP in 1 week.      Aftercare plan:     You have been scheduled with a psychiatrist who also treats substance use issues. Please call to confirm and complete paperwork in advance.   Date: Tuesday, 3/12/2024  Time: 1:00 pm - 1:30 pm  Provider: Anais Boyd MD, MD  Location: [address], [address], [place] [address]  Phone: [phone]  Type: Medication Mgmt - Initial (In-Person)        Substance Use Disorder Direct Access Resources    It is recommended that you abstain from all mood altering chemicals. Please contact the sober support hotline ([phone]) as needed; phones are answered 24 hours a day, 7 days a week.    To access substance use treatment you must have a comprehensive assessment completed to begin any treatment program. If you have private insurance, call the customer service number on the back of your insurance card to find an in-network substance abuse use disorder assessment. The ideal provider will be a treatment facility, licensed in the state [facility].     Community TONI Evaluations: Clients may call their county for a full list of providers - Availability and services listed belo are subject to change, please call the provider to confirm    [facility]  [phone] [address], [place], [address]  *Please call the above number  to schedule a comprehensive assessment for determination of level of care needs. In person and virtual appointments available Mon-Fri.    [facility], [address] Floor, [address], [place] [address] (next to the outpatient lab)    Phone: [phone]   Provides bridging services to people with Opiate Use Disorders (OUD) seeking care. This is a front door to Medication Assisted Treatments (MAT), ages 16+  Walk In hours: Monday-Friday 9:00am-3:00pm    [facility]  [phone]  Walk in Assessments: Mon-Friday 7a-1:45p  [address], [place], [address]    [facility]  Central Access [phone] [address] [place], [place], [address]  *by appointment only    [place]  [phone] (phone consultation available )  Locations in: [place], [facility], and [place], [place]  Pashto virtual [facility] [phone] or visit Bryan[employer]/DANIELE   Also offers LGBTQ programming     [facility]  [phone]  [address]  [place], [place], [address]  *Currently only offered via telehealth - call to set up an appointment    [facility]  [address] [place] [address], [address]  [facility]  For more information to to make a referral call:  [phone]  Walk-in on   9-11 a.m.    formerly [facility]  [phone]  [address] [place], [place], [address]  *available by appointments only    [facility] [place] [facility]  [phone]  [address] [place], [place], [address]  *available by appointment only    [facility]  [phone] [address] [place], [place], [address]  *walk in assessments available M-F starting at 7 am.    [facility]  [phone]  Locations: [facility], [facility], and [place]  *Walk in assessments availble M-F starting at 8 am -virtual only    [facility]  [phone]  [address] [place] [address]    [facility] Virtual + Locations: [place], [place], [place], [place], [facility], [place], [place]   [phone]  *available by appointment only    [facility]  [phone]  [address] [place], [address]    [facility]  [phone]  [address]  [place], [place], [address]  *available by appointment    [facility]  [phone]  [address] [place], [place], [address]  *walk ins available M-TH from 9-3    [facility]  MAT program: [phone]  [address] [place], [place], [address]    [place]  [phone]  Same day substance use disorder assessments are available Monday - Friday, via walk-in or by appointment at the [place] location.  [address], [place] [address]     [facility] - adolescent and adult SUDs services  [phone]  Offer services Monday through Friday, as well as evening hours Monday through Thursday. Normally, a first appointment will be scheduled within one week  [email or web address]  Locations all over [place]    If you are intoxicated, you may be required to detox at a detox facility before starting treatment. The following are detox facilities that you can self present to. All detox facilities are able to help you complete an assessment prior to discharge if you choose:    [facility]: Arrive at a [facility] for immediate medical evaluation    [facility]: [address] [place], [place], [address].         [phone]    [facility]: [address] [place], [place], [address]  [phone]     [facility]): [address] [place], [place], [address]  [phone]     [facility]: [address] [place], [address], [phone]         Ways to help  cope with sobriety:    -- Take prescribed medicines as scheduled  -- Keep follow-up appointments  -- Talk to others about your concerns  -- Get regular exercise  -- Practice deep breathing skills  -- Eat a healthy diet  -- Use community resources, including hotline numbers, [facility] crisis and support meetings  -- Stay sober and avoid places/people/things associated with substance use  --Maintain a daily schedule/routine  --Get at least 7-8 hours of sleep per night  --Create a list 10--20 healthy activities that you can do that are enjoyable and do not involve substance use  --Create daily goals (approx. 1-4 goals) per day and work to achieve them throughout the day.       Free Resources:    [facility] ([facility])  [facility] connects people seeking recovery to resources that help foster and sustain long-term recovery. Whether you are seeking resources for treatment, transportation, housing, job training, education, health care or other pathways to recovery, [facility] is a great place to start.  Phone: 744.711.7763. www.[place]280.Edicy (Great listing of all types of recovery and non-recovery related resources)    Alcoholics Anonymous  Phone: 2-749-ALCOHOL  Website: HTTP://WWW.AA.ORG/  [facility] (487-934-5703 or http://aaminneaMyagi.org)  [facility] [place] (001-081-3595 or www.aastpaul.org)     Narcotics Anonymous  Phone: 859.341.6684  Website: www.[employer].    [facility] [address], [place], [place],  Phone: [phone]  Drop-in Hours: Monday-Friday 9-11:30 am. By appointment at other times.  Provides: [facility] is a drop-in center on the [place] of [place] that provides a safe space for individuals who are homeless and have a history of chemical use. Sobriety is not a requirement but drugs and alcohol are not allowed on the property.  Services: Non-clients can access drop-in services such as Recovery and Harm Reduction Groups, referrals to case management,  community activities, shower facilities, and a pool table. Individuals who are homeless and have chemical health needs may be eligible for enrollment into [facility]'s case management program. Clients and  work together to access benefits, treatment, health care, shelter, and external housing resources.

## 2024-03-08 NOTE — ED NOTES
[facility]  ED Nurse Handoff Report    ED Chief complaint: Altered Mental Status  . ED Diagnosis:   Final diagnoses:   Hallucinations   History of alcohol abuse   Hypomagnesemia   Hypokalemia       Allergies: No Known Allergies    Code Status: Full Code    Activity level - Baseline/Home:  independent.  Activity Level - Current:   independent.   Lift room needed: No.   Bariatric: No   Needed: No   Isolation: No.   Infection: Not Applicable.     Respiratory status: Room air    Vital Signs (within 30 minutes):   Vitals:    03/08/24 0636 03/08/24 0806   BP: (!) 170/110 (!) 162/98   Pulse: 104 89   Resp: 18    Temp: 97.8  F (36.6  C)    TempSrc: Temporal    SpO2: 95%        Cardiac Rhythm:  ,      Pain level:    Patient confused: No.   Patient Falls Risk: Patient/Family educated   Elimination Status: Has voided     Patient Report - Initial Complaint: AMS  Focused Assessment: Carlos A Lopez is a 48 year old male who presents with wife for evaluation of altered mental status. The patient states two days ago he was offloading a dryer with his brother and fell backwards the height of the truck and hit his head and back. Later that night he reports one episode of headache and lightheadedness which has now resolved. He adds experiencing morning leg pain, leg numbness and weakness, uneven gate, and repetitive behavior for the past two days. The patient reports that he normally drinks regularly having 2-3 shots of liquor. He recently stopped having alcohol five days ago to prepare for a doctor's appointment and has been experiencing episodes of withdrawal. The patient's wife reports the patient has been taking marijuana edibles for the past three days to help with sleep. She states at 0400 this morning the patient believes he heard fourteen people speaking from the other room as well as his son sitting right beside him when he wasn't. Denies chest pain, shortness of breath, abdominal pain,  fever, nausea, vomiting, seizures, blood or pain when urinating, difficult bowl movements, black or bloody stool, diarrhea, and thoughts of hurting himself and others. Denies history of mental illness and taking blood thinners.     Abnormal Results:   Labs Ordered and Resulted from Time of ED Arrival to Time of ED Departure   COMPREHENSIVE METABOLIC PANEL - Abnormal       Result Value    Sodium 134 (*)     Potassium 3.0 (*)     Carbon Dioxide (CO2) 30 (*)     Anion Gap 15      Urea Nitrogen 9.8      Creatinine 0.70      GFR Estimate >90      Calcium 10.0      Chloride 89 (*)     Glucose 120 (*)     Alkaline Phosphatase 143       (*)     ALT 41      Protein Total 8.6 (*)     Albumin 4.4      Bilirubin Total 3.2 (*)    MAGNESIUM - Abnormal    Magnesium 1.5 (*)    TSH WITH FREE T4 REFLEX - Abnormal    TSH 5.67 (*)    ROUTINE UA WITH MICROSCOPIC REFLEX TO CULTURE - Abnormal    Color Urine Yellow      Appearance Urine Clear      Glucose Urine Negative      Bilirubin Urine Negative      Ketones Urine Negative      Specific Gravity Urine 1.015      Blood Urine Trace (*)     pH Urine 8.0 (*)     Protein Albumin Urine 50 (*)     Urobilinogen Urine 2.0      Nitrite Urine Negative      Leukocyte Esterase Urine Negative      RBC Urine 3 (*)     WBC Urine <1     ACETAMINOPHEN LEVEL - Abnormal    Acetaminophen <5.0 (*)    CBC WITH PLATELETS AND DIFFERENTIAL - Abnormal    WBC Count 5.8      RBC Count 3.97 (*)     Hemoglobin 14.3      Hematocrit 39.6 (*)           MCH 36.0 (*)     MCHC 36.1      RDW 11.4      Platelet Count 137 (*)     % Neutrophils 77      % Lymphocytes 9      % Monocytes 12      % Eosinophils 0      % Basophils 1      % Immature Granulocytes 1      NRBCs per 100 WBC 0      Absolute Neutrophils 4.5      Absolute Lymphocytes 0.5 (*)     Absolute Monocytes 0.7      Absolute Eosinophils 0.0      Absolute Basophils 0.0      Absolute Immature Granulocytes 0.0      Absolute NRBCs 0.0     URINE DRUG SCREEN  PANEL - Abnormal    Amphetamines Urine Screen Negative      Barbituates Urine Screen Negative      Benzodiazepine Urine Screen Negative      Cannabinoids Urine Screen Positive (*)     Cocaine Urine Screen Negative      Fentanyl Qual Urine Screen Negative      Opiates Urine Screen Negative      PCP Urine Screen Negative     BASIC METABOLIC PANEL - Abnormal    Sodium 138      Potassium 3.4      Chloride 95 (*)     Carbon Dioxide (CO2) 31 (*)     Anion Gap 12      Urea Nitrogen 7.7      Creatinine 0.69      GFR Estimate >90      Calcium 9.6      Glucose 92     TROPONIN T, HIGH SENSITIVITY - Normal    Troponin T, High Sensitivity <6     ETHYL ALCOHOL LEVEL - Normal    Alcohol ethyl <0.01     SALICYLATE LEVEL - Normal    Salicylate <0.3     CK TOTAL - Normal         T4 FREE - Normal    Free T4 1.28     AMMONIA - Normal    Ammonia 43     MAGNESIUM - Normal    Magnesium 1.7     PHOSPHORUS - Normal    Phosphorus 3.1     VITAMIN B12   FOLATE   VITAMIN B1 WHOLE BLOOD        MR Brain w/o & w Contrast   Final Result   IMPRESSION:   1.  Mild age-related changes without acute intracranial abnormality.      CT Head w/o Contrast   Final Result   IMPRESSION: No acute intracranial pathology.       BJORN MORE MD            SYSTEM ID:  [id number]       Abdomen Limited    (Results Pending)       Treatments provided: See MAR  Family Comments: Wife at bedside  OBS brochure/video discussed/provided to patient:  Yes  ED Medications:   Medications   sodium chloride 0.9% BOLUS 1,000 mL (has no administration in time range)   sodium chloride 0.9 % 1,000 mL with Infuvite Adult 10 mL, thiamine 100 mg, folic acid 1 mg infusion ( Intravenous Stopped 3/8/24 1121)   potassium chloride (KLOR-CON) Packet 40 mEq (40 mEq Oral $Given 3/8/24 0996)   magnesium sulfate 2 g in 50 mL sterile water intermittent infusion (0 g Intravenous Stopped 3/8/24 1011)   thiamine (B-1) injection 100 mg (100 mg Intravenous $Given 3/8/24 1604)   gadobutrol  (GADAVIST) injection 7.5 mL (7.5 mLs Intravenous $Given 3/8/24 2411)       Drips infusing:  No  For the majority of the shift this patient was Green.   Interventions performed were NA.    Sepsis treatment initiated: No    Cares/treatment/interventions/medications to be completed following ED care: None    ED Nurse Name: Heydi Hermosillo RN  5:40 PM    RECEIVING UNIT ED HANDOFF REVIEW    Above ED Nurse Handoff Report was reviewed: Yes  Reviewed by: Brandno Mcgregor RN on March 8, 2024 at 5:47 PM   AMY Harmon called the ED to inform them the note was read: No

## 2024-03-08 NOTE — PHARMACY-ADMISSION MEDICATION HISTORY
Pharmacist Admission Medication History    Admission medication history is complete. The information provided in this note is only as accurate as the sources available at the time of the update.    Information Source(s): Patient via in-person.    Pertinent Information: no pta meds.    No outpatient medications have been marked as taking for the 3/8/24 encounter (Hospital Encounter).

## 2024-03-09 ENCOUNTER — TELEPHONE (OUTPATIENT)
Dept: EMERGENCY MEDICINE | Facility: CLINIC | Age: 49
End: 2024-03-09
Payer: COMMERCIAL

## 2024-03-09 LAB — VIT B12 SERPL-MCNC: 1346 PG/ML (ref 232–1245)

## 2024-03-09 NOTE — TELEPHONE ENCOUNTER
[facility]    Reason for call: Lab Result Notification     Lab Result (including Rx patient on, if applicable).  If culture, copy of lab report at bottom.  Lab Result:   Component      Latest Ref Rng 3/8/2024  4:11 PM   Vitamin B12      232 - 1,245 pg/mL 1,346 (H)       Legend:  (H) High      Patient's current Symptoms:   At 2:57P Left voicemail message requesting a call back to [facility] ED Lab Result RN at [phone]. RN is available every day between 9 a.m. and 5:30 p.m.     Has appt with Dr Ector Escobar, [facility] on 3/12/24      Leonidas Nichole RN

## 2024-03-09 NOTE — ED PROVIDER NOTES
I assumed care for this patient after signout from my colleague.  Plan at time of signout was to follow-up with the hospitalist after their workup.  It seems that the hospitalist would prefer to have further testing and imaging prior to the decision to admit or discharge.  The hospitalist did not contact me regarding their recommendations or thoughts, but they did end up discharging the patient.  Patient was discharged without me speaking to the hospitalist or evaluating the patient.    Camacho Robin MD on 3/9/2024 at 3:18 PM       Camacho Robin MD  03/09/24 1518

## 2024-03-09 NOTE — LETTER
March 9, 2024        Carlos A Lopez  [address] [place] [address]          Dear Carlos A Lopez:    You were seen in the [facility] at [facility] EMERGENCY DEPT on 3/8/2024.  We are unable to reach you by phone, so we are sending you this letter.     It is important that you call [facility] lab result nurse at [phone], as we have information to relay to you AND/OR we MAY have to make some changes in your treatment.    Best time to call back is between 9AM and 5:30PM, 7 days a week.      Sincerely,     [facility] Lab Result RN  [phone]

## 2024-03-14 LAB — VIT B1 PYROPHOSHATE BLD-SCNC: 241 NMOL/L

## 2024-07-21 ENCOUNTER — HOSPITAL ENCOUNTER (EMERGENCY)
Facility: CLINIC | Age: 49
Discharge: HOME | End: 2024-07-21
Attending: EMERGENCY MEDICINE | Admitting: EMERGENCY MEDICINE
Payer: COMMERCIAL

## 2024-07-21 ENCOUNTER — APPOINTMENT (OUTPATIENT)
Dept: CT IMAGING | Facility: CLINIC | Age: 49
End: 2024-07-21
Payer: COMMERCIAL

## 2024-07-21 ENCOUNTER — APPOINTMENT (OUTPATIENT)
Dept: GENERAL RADIOLOGY | Facility: CLINIC | Age: 49
End: 2024-07-21
Payer: COMMERCIAL

## 2024-07-21 VITALS
SYSTOLIC BLOOD PRESSURE: 142 MMHG | DIASTOLIC BLOOD PRESSURE: 93 MMHG | HEART RATE: 95 BPM | OXYGEN SATURATION: 95 % | HEIGHT: 70 IN | WEIGHT: 180 LBS | RESPIRATION RATE: 17 BRPM | TEMPERATURE: 97.2 F | BODY MASS INDEX: 25.77 KG/M2
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
## 2024-07-21 DIAGNOSIS — E87.6 HYPOKALEMIA: ICD-10-CM

## 2024-07-21 DIAGNOSIS — L03.116 CELLULITIS OF LEFT FOOT: ICD-10-CM

## 2024-07-21 DIAGNOSIS — E83.42 HYPOMAGNESEMIA: ICD-10-CM

## 2024-07-21 DIAGNOSIS — S01.01XA SCALP LACERATION, INITIAL ENCOUNTER: ICD-10-CM

## 2024-07-21 LAB
ALBUMIN SERPL BCG-MCNC: 4.4 G/DL (ref 3.5–5.2)
ALP SERPL-CCNC: 194 U/L (ref 40–150)
ALT SERPL W P-5'-P-CCNC: 19 U/L (ref 0–70)
ANION GAP SERPL CALCULATED.3IONS-SCNC: 17 MMOL/L (ref 7–15)
AST SERPL W P-5'-P-CCNC: 129 U/L (ref 0–45)
BASOPHILS # BLD AUTO: 0.2 10E3/UL (ref 0–0.2)
BASOPHILS NFR BLD AUTO: 2 %
BILIRUB SERPL-MCNC: 1.4 MG/DL
BUN SERPL-MCNC: 5 MG/DL (ref 6–20)
CALCIUM SERPL-MCNC: 9 MG/DL (ref 8.8–10.4)
CHLORIDE SERPL-SCNC: 86 MMOL/L (ref 98–107)
CREAT SERPL-MCNC: 0.64 MG/DL (ref 0.67–1.17)
CRP SERPL-MCNC: 19.7 MG/L
EGFRCR SERPLBLD CKD-EPI 2021: >90 ML/MIN/1.73M2
EOSINOPHIL # BLD AUTO: 0.2 10E3/UL (ref 0–0.7)
EOSINOPHIL NFR BLD AUTO: 2 %
ERYTHROCYTE [DISTWIDTH] IN BLOOD BY AUTOMATED COUNT: 12.4 % (ref 10–15)
ERYTHROCYTE [SEDIMENTATION RATE] IN BLOOD BY WESTERGREN METHOD: 37 MM/HR (ref 0–15)
GLUCOSE SERPL-MCNC: 103 MG/DL (ref 70–99)
HCO3 SERPL-SCNC: 32 MMOL/L (ref 22–29)
HCT VFR BLD AUTO: 40.4 % (ref 40–53)
HGB BLD-MCNC: 13.9 G/DL (ref 13.3–17.7)
HOLD SPECIMEN: NORMAL
HOLD SPECIMEN: NORMAL
IMM GRANULOCYTES # BLD: 0 10E3/UL
IMM GRANULOCYTES NFR BLD: 0 %
LYMPHOCYTES # BLD AUTO: 1.5 10E3/UL (ref 0.8–5.3)
LYMPHOCYTES NFR BLD AUTO: 17 %
MAGNESIUM SERPL-MCNC: 1.5 MG/DL (ref 1.7–2.3)
MCH RBC QN AUTO: 35.2 PG (ref 26.5–33)
MCHC RBC AUTO-ENTMCNC: 34.4 G/DL (ref 31.5–36.5)
MCV RBC AUTO: 102 FL (ref 78–100)
MONOCYTES # BLD AUTO: 0.9 10E3/UL (ref 0–1.3)
MONOCYTES NFR BLD AUTO: 10 %
NEUTROPHILS # BLD AUTO: 6 10E3/UL (ref 1.6–8.3)
NEUTROPHILS NFR BLD AUTO: 69 %
NRBC # BLD AUTO: 0 10E3/UL
NRBC BLD AUTO-RTO: 0 /100
PLATELET # BLD AUTO: 189 10E3/UL (ref 150–450)
POTASSIUM SERPL-SCNC: 3 MMOL/L (ref 3.4–5.3)
PROT SERPL-MCNC: 8.9 G/DL (ref 6.4–8.3)
RBC # BLD AUTO: 3.95 10E6/UL (ref 4.4–5.9)
SODIUM SERPL-SCNC: 135 MMOL/L (ref 135–145)
WBC # BLD AUTO: 8.7 10E3/UL (ref 4–11)

## 2024-07-21 PROCEDURE — 90715 TDAP VACCINE 7 YRS/> IM: CPT

## 2024-07-21 PROCEDURE — 250N000013 HC RX MED GY IP 250 OP 250 PS 637: Performed by: EMERGENCY MEDICINE

## 2024-07-21 PROCEDURE — 85652 RBC SED RATE AUTOMATED: CPT

## 2024-07-21 PROCEDURE — 86140 C-REACTIVE PROTEIN: CPT

## 2024-07-21 PROCEDURE — 83735 ASSAY OF MAGNESIUM: CPT | Performed by: EMERGENCY MEDICINE

## 2024-07-21 PROCEDURE — 99285 EMERGENCY DEPT VISIT HI MDM: CPT | Mod: 25

## 2024-07-21 PROCEDURE — 96365 THER/PROPH/DIAG IV INF INIT: CPT | Mod: 59

## 2024-07-21 PROCEDURE — 71046 X-RAY EXAM CHEST 2 VIEWS: CPT

## 2024-07-21 PROCEDURE — 250N000011 HC RX IP 250 OP 636

## 2024-07-21 PROCEDURE — 250N000011 HC RX IP 250 OP 636: Performed by: EMERGENCY MEDICINE

## 2024-07-21 PROCEDURE — 70450 CT HEAD/BRAIN W/O DYE: CPT

## 2024-07-21 PROCEDURE — 90471 IMMUNIZATION ADMIN: CPT

## 2024-07-21 PROCEDURE — 36415 COLL VENOUS BLD VENIPUNCTURE: CPT

## 2024-07-21 PROCEDURE — 73630 X-RAY EXAM OF FOOT: CPT | Mod: LT

## 2024-07-21 PROCEDURE — 72125 CT NECK SPINE W/O DYE: CPT

## 2024-07-21 PROCEDURE — 85025 COMPLETE CBC W/AUTO DIFF WBC: CPT

## 2024-07-21 PROCEDURE — 12002 RPR S/N/AX/GEN/TRNK2.6-7.5CM: CPT

## 2024-07-21 PROCEDURE — 84295 ASSAY OF SERUM SODIUM: CPT

## 2024-07-21 RX ORDER — CEPHALEXIN 500 MG/1
500 CAPSULE ORAL 4 TIMES DAILY
Qty: 28 CAPSULE | Refills: 0 | Status: SHIPPED | OUTPATIENT
Start: 2024-07-21 | End: 2024-07-28

## 2024-07-21 RX ORDER — MAGNESIUM SULFATE HEPTAHYDRATE 40 MG/ML
2 INJECTION, SOLUTION INTRAVENOUS ONCE
Status: COMPLETED | OUTPATIENT
Start: 2024-07-21 | End: 2024-07-21

## 2024-07-21 RX ORDER — POTASSIUM CHLORIDE 1500 MG/1
40 TABLET, EXTENDED RELEASE ORAL ONCE
Status: COMPLETED | OUTPATIENT
Start: 2024-07-21 | End: 2024-07-21

## 2024-07-21 RX ORDER — POTASSIUM CHLORIDE 1500 MG/1
20 TABLET, EXTENDED RELEASE ORAL 2 TIMES DAILY
Qty: 6 TABLET | Refills: 0 | Status: SHIPPED | OUTPATIENT
Start: 2024-07-21 | End: 2024-07-24

## 2024-07-21 RX ORDER — LIDOCAINE HYDROCHLORIDE 10 MG/ML
INJECTION, SOLUTION EPIDURAL; INFILTRATION; INTRACAUDAL; PERINEURAL
Status: DISCONTINUED
Start: 2024-07-21 | End: 2024-07-21 | Stop reason: HOSPADM

## 2024-07-21 RX ADMIN — CLOSTRIDIUM TETANI TOXOID ANTIGEN (FORMALDEHYDE INACTIVATED), CORYNEBACTERIUM DIPHTHERIAE TOXOID ANTIGEN (FORMALDEHYDE INACTIVATED), BORDETELLA PERTUSSIS TOXOID ANTIGEN (GLUTARALDEHYDE INACTIVATED), BORDETELLA PERTUSSIS FILAMENTOUS HEMAGGLUTININ ANTIGEN (FORMALDEHYDE INACTIVATED), BORDETELLA PERTUSSIS PERTACTIN ANTIGEN, AND BORDETELLA PERTUSSIS FIMBRIAE 2/3 ANTIGEN 0.5 ML: 5; 2; 2.5; 5; 3; 5 INJECTION, SUSPENSION INTRAMUSCULAR at 20:58

## 2024-07-21 RX ADMIN — POTASSIUM CHLORIDE 40 MEQ: 1500 TABLET, EXTENDED RELEASE ORAL at 20:57

## 2024-07-21 RX ADMIN — MAGNESIUM SULFATE HEPTAHYDRATE 2 G: 2 INJECTION, SOLUTION INTRAVENOUS at 20:58

## 2024-07-21 ASSESSMENT — ACTIVITIES OF DAILY LIVING (ADL)
ADLS_ACUITY_SCORE: 33
ADLS_ACUITY_SCORE: 35
ADLS_ACUITY_SCORE: 35
ADLS_ACUITY_SCORE: 33

## 2024-07-21 ASSESSMENT — COLUMBIA-SUICIDE SEVERITY RATING SCALE - C-SSRS
2. HAVE YOU ACTUALLY HAD ANY THOUGHTS OF KILLING YOURSELF IN THE PAST MONTH?: NO
6. HAVE YOU EVER DONE ANYTHING, STARTED TO DO ANYTHING, OR PREPARED TO DO ANYTHING TO END YOUR LIFE?: NO
1. IN THE PAST MONTH, HAVE YOU WISHED YOU WERE DEAD OR WISHED YOU COULD GO TO SLEEP AND NOT WAKE UP?: NO

## 2024-07-21 NOTE — ED PROVIDER NOTES
ED APC SUPERVISION NOTE:   I evaluated this patient in conjunction with Nader De La O PA-C  I have participated in the care of the patient and personally performed key elements of the history, exam, and medical decision making.      HPI:   Carlos A Lopez is a 48 year old male with a history of alcohol use disorder who presents to the ED with a head injury. Patient reports that around 1700 he stood up from the toilet and fell forward, hitting the left side of his head on the closet. Patient says he has been having left foot pain for 2 weeks that is worse in the morning and with bearing weight on it. He denies ankle pain. Has been using ice packs and Icy Hot on it. Patient endorses alcohol use. Had 1 1/2 drinks today with vodka.      Independent Historian:   None    Review of External Notes: None      EXAM:   General:   Well-nourished   Speaking in full sentences   Answers questions appropriately   Appears mildly intoxicated  Head:   Laceration to left temporoparietal region   Staples in place at time of my evaluation.  No active bleeding   Remainder of scalp without open wounds orbleeding  Eyes:   Conjunctiva without injection or scleral icterus  ENT:   Moist mucous membranes   No hemotympanum   No septal hematoma   Nares patent   Pinnae normal  Neck:   C-collar in place   No step-off  Resp:   Lungs CTAB   No crackles, wheezing or audible rubs   Good air movement  CV:    Normal rate, regular rhythm   S1 and S2 present   No murmur, gallop or rub  GI:   BS present   Abdomen soft without distention   Non-tender to light and deep palpation   No guarding or rebound tenderness  Skin:   Warm, dry, well perfused   No rashes or open wounds on exposed skin  MSK:   Moves all extremities   Mild edema to left foot   Mild overlying erythema to this region   No joint swelling or impaired ROM about knee, ankle or foot  Neuro:   Alert   Answers questions appropriately   Moves all extremities equally   Gait stable  Psych:   Normal  affect, normal mood      Independent Interpretation (X-rays, CTs, rhythm strip):  I reviewed the left foot XR and note no acute fracture.    Consultations/Discussion of Management or Tests:  None     Social Determinants of Health affecting care:   None     MEDICAL DECISION MAKING/ASSESSMENT AND PLAN:   Carlos A Lopez is a 48-year-old male presenting to the ER for evaluation of a head injury.  VS on presentation reveal elevated BP though otherwise are unremarkable.  I suspect patient's fall was likely secondary to mechanical ideologies.  He notes a history of left foot pain for the past few weeks, and upon standing from the toilet, noted that he developed pain upon placing weight to his left foot, resulting in his fall.  Given his history of alcohol abuse, advanced imaging pursued including CT of the head and cervical spine.  Scalp laceration was repaired.  No evidence of ICH, skull fracture, nor cervical spine fracture.  With regards to patient's left foot pain, he does have localized swelling, and faint overlying erythema.  Precise cause not entirely clear, though given history of alcohol abuse, relative immunocompromised state, as well as elevated inflammatory markers, given his redness I feel it would be reasonable to treat empirically for early cellulitis.  He has no evidence of crepitance, open wounds, nor fluctuance that would raise concern for abscess.  Additionally, he demonstrates full range of motion about the ankle and phalanges, for which I feel this is also unlikely to represent septic arthritis.  His extremities otherwise warm and well-perfused.  During his ED course, he was noted to fall asleep and developed transient hypoxia.  Suspect this likely is secondary to sleep apnea.  Upon awakening, oxygen saturations normalized.  Chest x-ray unremarkable.  Ambulation trial without evidence of hypoxia or development of symptoms.  Patient does have a history of alcohol abuse, though at this point, is here  with his spouse, able to carry on a conversation without significant slurring of speech, and ambulatory independently.  I feel they can be safely discharged from the ER at this time.  His laboratory evaluation does reveal elevated LFTs in a pattern consistent with alcoholic hepatitis.  Potassium and magnesium also low, both of which were repleted.  Will discharge home with oral potassium supplementation.  He also has a mildly elevated anion gap, likely secondary to alcoholic ketoacidosis.  Bicarb elevated within range of previous.  Patient is welcome to return to the ER at any point with new or troubling symptoms.     DIAGNOSIS:     ICD-10-CM    1. Scalp laceration, initial encounter  S01.01XA       2. Fall at home, initial encounter  W19.XXXA     Y92.009       3. Cellulitis of left foot  L03.116       4. Hypokalemia  E87.6       5. Hypomagnesemia  E83.42           DISPOSITION:   The patient was discharged to home.      Scribe Disclosure:  INatali, am serving as a scribe at 6:42 PM on 7/21/2024 to document services personally performed by Jerry Michaels MD based on my observations and the provider's statements to me.     7/21/2024  [facility] EMERGENCY DEPT     Jerry Michaels MD  07/21/24 3218

## 2024-07-21 NOTE — ED TRIAGE NOTES
Pt states he was on the toilet and when he stood up he fell over and hit on a door.  Per wife pt fell over a closet door and hit his head, she states there is a lot of blood all over the bathroom.  Pt denies LOC or taking blood thinners.     Triage Assessment (Adult)       Row Name 07/21/24 3869          Triage Assessment    Airway WDL WDL        Respiratory WDL    Respiratory WDL WDL        Cardiac WDL    Cardiac WDL WDL

## 2024-07-21 NOTE — ED PROVIDER NOTES
Emergency Department Note      History of Present Illness     Chief Complaint   Head Injury      HPI   Carlos A Lopez is a 48 year old male with history of alcohol dependence with withdrawal and hallucinations who presents with complaint of laceration on his head after a fall.  Patient states he was standing up from the toilet when he fell forward landing on his head.  States fall was caused by pain in his left foot, he denies dizziness prior to the fall.  Denies loss of consciousness after the fall.  Patient states he has had pain and swelling in his left foot for the last 3 weeks.  Pain and swelling are on the top of his foot and worse with weightbearing.  Patient states he had 2 alcoholic beverages today.  States he drinks alcohol 2-4 times per week.  Patient's wife states she heard him yelling upstairs, went upstairs and found blood all over the bathroom however patient was standing on his own.  Patient denies headache, dizziness, chest pain, shortness of breath, abdominal pain, nausea and vomiting.  Patient also denies fever and chills for the last 3 weeks.    Independent Historian   Wife as detailed above.    Review of External Notes   3/8/2024 ED note reviewed for AMS and hallucinations after taking marijuana Gummies    Past Medical History     Medical History and Problem List   No past medical history on file.    Medications   cephALEXin (KEFLEX) 500 MG capsule  potassium chloride ananya ER (KLOR-CON M20) 20 MEQ CR tablet        Surgical History   Past Surgical History:   Procedure Laterality Date    IR CHEST PORT PLACEMENT > 5 YRS OF AGE  6/5/2015       Physical Exam     Patient Vitals for the past 24 hrs:   BP Temp Temp src Pulse Resp SpO2 Height Weight   07/21/24 2145 -- -- -- -- -- 95 % -- --   07/21/24 2000 (!) 154/101 -- -- 99 -- 93 % -- --   07/21/24 1950 (!) 147/99 -- -- -- -- 95 % -- --   07/21/24 1940 (!) 150/99 -- -- -- -- 94 % -- --   07/21/24 1749 (!) 146/107 97.2  F (36.2  C) Temporal 106 17  "99 % 1.778 m (5' 10\") 81.6 kg (180 lb)     Physical Exam  Constitutional: Alert, attentive, GCS 15, slurred speech  HENT:    Head: scalp laceration 6 cm left parietal area actively bleeding, no contusions, no periorbital or posterior auricular ecchymosis.    Ears: no hemotympanum   Nose: Nose normal.    Mouth/Throat: Oropharynx is clear, mucous membranes are moist, no blood in oral cavity, no dental subluxation  Neck: No C-collar in place initially, c-collar applied in ED, no midline tenderness, no step-offs  Eyes: EOM are normal, conjugate gaze, pupils symmetric reactive.   CV: regular rate and rhythm; no murmurs  Chest: Effort normal and breath sounds normal, symmetric bilaterally. No crepitus  GI:  Non-tender without guarding or rebound  Back: No T or L spine tenderness, no step offs  MSK: No long bone tenderness or deformities.  Muscle compartments soft. Left lower extremity 5/5 strength to the DF, PF, EHL and FHL motor functions; sensation intact to the DP, SP, T, S and S distributions.  Left foot erythema and edema on dorsal aspect with tenderness when the foot was squeezed however no tenderness to direct palpation over the dorsal foot or talus, 2+ DP and PT pulses, brisk distal cap refill.  Neurological: Alert, attentive.  and plantar strength 5/5.  Sensation intact to light touch in in distal BLE and BUE.    Skin: Skin is warm and dry.       Diagnostics     Lab Results   Labs Ordered and Resulted from Time of ED Arrival to Time of ED Departure   COMPREHENSIVE METABOLIC PANEL - Abnormal       Result Value    Sodium 135      Potassium 3.0 (*)     Carbon Dioxide (CO2) 32 (*)     Anion Gap 17 (*)     Urea Nitrogen 5.0 (*)     Creatinine 0.64 (*)     GFR Estimate >90      Calcium 9.0      Chloride 86 (*)     Glucose 103 (*)     Alkaline Phosphatase 194 (*)      (*)     ALT 19      Protein Total 8.9 (*)     Albumin 4.4      Bilirubin Total 1.4 (*)    CRP INFLAMMATION - Abnormal    CRP Inflammation 19.70 " (*)    ERYTHROCYTE SEDIMENTATION RATE AUTO - Abnormal    Erythrocyte Sedimentation Rate 37 (*)    CBC WITH PLATELETS AND DIFFERENTIAL - Abnormal    WBC Count 8.7      RBC Count 3.95 (*)     Hemoglobin 13.9      Hematocrit 40.4       (*)     MCH 35.2 (*)     MCHC 34.4      RDW 12.4      Platelet Count 189      % Neutrophils 69      % Lymphocytes 17      % Monocytes 10      % Eosinophils 2      % Basophils 2      % Immature Granulocytes 0      NRBCs per 100 WBC 0      Absolute Neutrophils 6.0      Absolute Lymphocytes 1.5      Absolute Monocytes 0.9      Absolute Eosinophils 0.2      Absolute Basophils 0.2      Absolute Immature Granulocytes 0.0      Absolute NRBCs 0.0     MAGNESIUM - Abnormal    Magnesium 1.5 (*)        Imaging   XR Chest 2 Views   Final Result   IMPRESSION: Negative chest.      Foot XR, G/E 3 views, left   Final Result   IMPRESSION: Three views of the left foot show no evidence of an acute displaced fracture. Joint spaces are maintained. No Lisfranc subluxation.      CT Cervical Spine w/o Contrast   Final Result   IMPRESSION:   1.  No fracture or posttraumatic subluxation.   2.  No high-grade spinal canal or neural foraminal stenosis.      CT Head w/o Contrast   Final Result   IMPRESSION:   1.  Left frontal scalp hematoma/laceration with skin staples in place.   2.  No CT evidence for acute intracranial process.   3.  Brain atrophy and presumed chronic microvascular ischemic changes as above.              Independent Interpretation   CXR: No pneumothorax, infiltrate, pleural effusion, or mediastinal widening.  CT Head: No intracranial hemorrhage or midline shift.  X-ray left foot shows fractures, subluxations, or Lisfranc injury    ED Course      Medications Administered   Medications   lidocaine (PF) (XYLOCAINE) 1 % injection (has no administration in time range)   Tdap (tetanus-diphtheria-acell pertussis) (ADACEL) injection 0.5 mL (0.5 mLs Intramuscular $Given 7/21/24 9936)   magnesium  sulfate 2 g in 50 mL sterile water intermittent infusion (0 g Intravenous Stopped 7/21/24 2148)   potassium chloride ananya ER (KLOR-CON M20) CR tablet 40 mEq (40 mEq Oral $Given 7/21/24 2057)       Procedures   Procedures     Laceration Repair      Procedure: Laceration Repair    Indication: Laceration    Consent: Verbal    Tetanus status reviewed unknown    Location: Left Scalp     Length: 6 cm    Preparation: Irrigation with Sterile Saline.    Anesthesia/Sedation: Lidocaine - 1%      Treatment/Exploration: Wound explored, no foreign bodies found     Closure: The wound was closed with  12 staples.    Patient Status: The patient tolerated the procedure well: Yes. There were no complications.   Discussion of Management   Staffed with Dr. Michaels    ED Course   ED Course as of 07/21/24 2152   Sun Jul 21, 2024 1825 I evaluated and examined the patient, c-collar applied   1900 ED tech notified provider the laceration on the patient's scalp was actively bleeding, she is having difficulty controlling the bleeding while she was cleaning it.  I assessed the laceration and closed it as noted in procedure note.   1950 Was notified by RN patient's SpO2 briefly dropped to 87% while he was laying down on gurney.  Chest x-ray ordered.   2140 I reevaluated the patient, he is standing at bedside now fully clothed.  He no longer has slurred speech, has a steady gait, no difficulty breathing.  No other changes in clinical condition.  Bandage applied to the scalp laceration does not appear to have bleeding.       Optional/Additional Documentation  None    Medical Decision Making / Diagnosis     CMS Diagnoses: None    MIPS       None    [facility]   Carlos A Lopez is a 48 year old male with history of alcohol dependence with withdrawal and hallucinations who presents with complaint of scalp laceration after a fall.  Differential includes but is not limited to soft tissue injury, ICH, cervical spine injury, occult fracture, septic arthritis,  and cellulitis among many others.  At presentation patient was mildly hypertensive with blood pressure 146/107  mildly tachycardic with heart rate 107 otherwise he was not febrile and not hypoxic.  Physical exam was significant for laceration on the his scalp left parietal area as noted above, bleeding was controlled with direct pressure.   Patient's left foot also had erythema and edema on dorsal aspect.    CT of head there is no ICH or other abnormalities, CT of cervical spine showed no fractures or subluxations.  X-ray of the left foot also showed no fractures or subluxations or Lisfranc injury.  I obtained an x-ray of his chest after he had a brief period of hypoxia while laying flat, I suspect he fell asleep and has a degree of sleep apnea exacerbated by alcohol intake today.  His chest x-ray was negative for infiltrates or effusions or other acute abnormalities.  Patient's scalp laceration was closed as noted above, he was unaware of his last tetanus shot therefore he was updated today with a Tdap.  On laboratory workup, there was no leukocytosis nor anemia.  Patient's CRP and ESR were mildly elevated however I doubt septic arthritis in the left foot at this time, clinical presentation and findings are more consistent with cellulitis of the dorsal aspect of his left foot.  Patient was mildly hypokalemic with potassium 3.0 and hypomagnesemic with magnesium 1.5.  His magnesium and potassium were replaced.  Patient was able to ambulate unassisted with pulse ox monitor in place, without return of hypoxia.  He had no difficulty walking.  I discussed all these findings with the patient and recommended a 7-day course of antibiotics for the cellulitis and 3 more days of oral potassium replacement.  When I spoke with the patient, he appeared clinically sober without slurred speech and a with normal gait.  Return precautions were provided as well as instructions for care of the scalp laceration with instructions to have  staples removed in 7 days by his PCP.  The patient and his wife state they understand and agree with stated plans.  The patient was discharged in stable condition in the care of his wife who is also his .      Disposition   The patient was discharged.     Diagnosis     ICD-10-CM    1. Scalp laceration, initial encounter  S01.01XA       2. Fall at home, initial encounter  W19.XXXA     Y92.009       3. Cellulitis of left foot  L03.116       4. Hypokalemia  E87.6       5. Hypomagnesemia  E83.42            Discharge Medications   New Prescriptions    CEPHALEXIN (KEFLEX) 500 MG CAPSULE    Take 1 capsule (500 mg) by mouth 4 times daily for 7 days    POTASSIUM CHLORIDE CHETAN ER (KLOR-CON M20) 20 MEQ CR TABLET    Take 1 tablet (20 mEq) by mouth 2 times daily for 3 days         ALICE Ricardo John, PA-C  07/21/24 2151       Nader De La O PA-C  07/21/24 2152

## 2024-07-22 NOTE — ED NOTES
Emergency Department Technician Wound Irrigation Note:    7/21/2024    7:16 PM      Wound location:  Head    Irrigation Fluid: Normal Saline    Estimated Irrigation Volume (60 mL fluid per cm): 1000cc    Joanne Roldan

## 2025-07-18 ENCOUNTER — APPOINTMENT (OUTPATIENT)
Dept: CT IMAGING | Facility: CLINIC | Age: 50
End: 2025-07-18
Attending: EMERGENCY MEDICINE
Payer: COMMERCIAL

## 2025-07-18 ENCOUNTER — APPOINTMENT (OUTPATIENT)
Dept: ULTRASOUND IMAGING | Facility: CLINIC | Age: 50
End: 2025-07-18
Attending: EMERGENCY MEDICINE
Payer: COMMERCIAL

## 2025-07-18 ENCOUNTER — HOSPITAL ENCOUNTER (EMERGENCY)
Facility: CLINIC | Age: 50
Discharge: HOME | End: 2025-07-18
Attending: EMERGENCY MEDICINE | Admitting: EMERGENCY MEDICINE
Payer: COMMERCIAL

## 2025-07-18 VITALS
TEMPERATURE: 98.3 F | DIASTOLIC BLOOD PRESSURE: 87 MMHG | OXYGEN SATURATION: 94 % | RESPIRATION RATE: 16 BRPM | WEIGHT: 174.38 LBS | BODY MASS INDEX: 25.02 KG/M2 | SYSTOLIC BLOOD PRESSURE: 151 MMHG | HEART RATE: 79 BPM

## 2025-07-18 DIAGNOSIS — R55 NEAR SYNCOPE: ICD-10-CM

## 2025-07-18 DIAGNOSIS — F10.90 ALCOHOL USE DISORDER: ICD-10-CM

## 2025-07-18 DIAGNOSIS — K70.30 ALCOHOLIC CIRRHOSIS OF LIVER WITHOUT ASCITES (H): ICD-10-CM

## 2025-07-18 DIAGNOSIS — R91.1 PULMONARY NODULE: ICD-10-CM

## 2025-07-18 DIAGNOSIS — E83.42 HYPOMAGNESEMIA: ICD-10-CM

## 2025-07-18 DIAGNOSIS — E87.6 HYPOKALEMIA: ICD-10-CM

## 2025-07-18 DIAGNOSIS — K80.20 CALCULUS OF GALLBLADDER WITHOUT CHOLECYSTITIS WITHOUT OBSTRUCTION: ICD-10-CM

## 2025-07-18 LAB
ALBUMIN SERPL BCG-MCNC: 3.9 G/DL (ref 3.5–5.2)
ALBUMIN UR-MCNC: NEGATIVE MG/DL
ALP SERPL-CCNC: 161 U/L (ref 40–150)
ALT SERPL W P-5'-P-CCNC: 27 U/L (ref 0–70)
AMMONIA PLAS-SCNC: 32 UMOL/L (ref 16–60)
ANION GAP SERPL CALCULATED.3IONS-SCNC: 19 MMOL/L (ref 7–15)
APPEARANCE UR: CLEAR
AST SERPL W P-5'-P-CCNC: 200 U/L (ref 0–45)
B-OH-BUTYR SERPL-SCNC: 0.71 MMOL/L
BASOPHILS # BLD AUTO: 0.2 10E3/UL (ref 0–0.2)
BASOPHILS NFR BLD AUTO: 2 %
BILIRUB DIRECT SERPL-MCNC: 2.84 MG/DL (ref 0–0.3)
BILIRUB SERPL-MCNC: 5.1 MG/DL
BILIRUB UR QL STRIP: NEGATIVE
BUN SERPL-MCNC: 8.6 MG/DL (ref 6–20)
CALCIUM SERPL-MCNC: 10.3 MG/DL (ref 8.8–10.4)
CHLORIDE SERPL-SCNC: 90 MMOL/L (ref 98–107)
COLOR UR AUTO: ABNORMAL
CREAT SERPL-MCNC: 0.82 MG/DL (ref 0.67–1.17)
EGFRCR SERPLBLD CKD-EPI 2021: >90 ML/MIN/1.73M2
EOSINOPHIL # BLD AUTO: 0.1 10E3/UL (ref 0–0.7)
EOSINOPHIL NFR BLD AUTO: 1 %
ERYTHROCYTE [DISTWIDTH] IN BLOOD BY AUTOMATED COUNT: 13.5 % (ref 10–15)
ETHANOL SERPL-MCNC: 0.23 G/DL
GLUCOSE SERPL-MCNC: 96 MG/DL (ref 70–99)
GLUCOSE UR STRIP-MCNC: NEGATIVE MG/DL
HCO3 SERPL-SCNC: 28 MMOL/L (ref 22–29)
HCT VFR BLD AUTO: 31.4 % (ref 40–53)
HGB BLD-MCNC: 11.3 G/DL (ref 13.3–17.7)
HGB UR QL STRIP: NEGATIVE
HOLD SPECIMEN: NORMAL
IMM GRANULOCYTES # BLD: 0 10E3/UL
IMM GRANULOCYTES NFR BLD: 1 %
INR PPP: 1.51 (ref 0.85–1.15)
KETONES UR STRIP-MCNC: NEGATIVE MG/DL
LACTATE SERPL-SCNC: 2.2 MMOL/L (ref 0.7–2)
LEUKOCYTE ESTERASE UR QL STRIP: NEGATIVE
LIPASE SERPL-CCNC: 80 U/L (ref 13–60)
LYMPHOCYTES # BLD AUTO: 1.4 10E3/UL (ref 0.8–5.3)
LYMPHOCYTES NFR BLD AUTO: 17 %
MAGNESIUM SERPL-MCNC: 1.1 MG/DL (ref 1.7–2.3)
MCH RBC QN AUTO: 37.4 PG (ref 26.5–33)
MCHC RBC AUTO-ENTMCNC: 36 G/DL (ref 31.5–36.5)
MCV RBC AUTO: 104 FL (ref 78–100)
MONOCYTES # BLD AUTO: 0.9 10E3/UL (ref 0–1.3)
MONOCYTES NFR BLD AUTO: 11 %
NEUTROPHILS # BLD AUTO: 5.4 10E3/UL (ref 1.6–8.3)
NEUTROPHILS NFR BLD AUTO: 68 %
NITRATE UR QL: NEGATIVE
NRBC # BLD AUTO: 0 10E3/UL
NRBC BLD AUTO-RTO: 0 /100
PH UR STRIP: 7.5 [PH] (ref 5–7)
PLATELET # BLD AUTO: 77 10E3/UL (ref 150–450)
POTASSIUM SERPL-SCNC: 2.8 MMOL/L (ref 3.4–5.3)
PROT SERPL-MCNC: 9.1 G/DL (ref 6.4–8.3)
PROTHROMBIN TIME: 18.2 SECONDS (ref 11.8–14.8)
RBC # BLD AUTO: 3.02 10E6/UL (ref 4.4–5.9)
RBC URINE: 1 /HPF
SODIUM SERPL-SCNC: 137 MMOL/L (ref 135–145)
SP GR UR STRIP: 1.02 (ref 1–1.03)
TROPONIN T SERPL HS-MCNC: 9 NG/L
UROBILINOGEN UR STRIP-MCNC: NORMAL MG/DL
WBC # BLD AUTO: 7.9 10E3/UL (ref 4–11)
WBC URINE: <1 /HPF

## 2025-07-18 PROCEDURE — 96365 THER/PROPH/DIAG IV INF INIT: CPT | Mod: 59

## 2025-07-18 PROCEDURE — 250N000009 HC RX 250: Performed by: EMERGENCY MEDICINE

## 2025-07-18 PROCEDURE — 82010 KETONE BODYS QUAN: CPT | Performed by: EMERGENCY MEDICINE

## 2025-07-18 PROCEDURE — 81001 URINALYSIS AUTO W/SCOPE: CPT | Performed by: EMERGENCY MEDICINE

## 2025-07-18 PROCEDURE — 84484 ASSAY OF TROPONIN QUANT: CPT | Performed by: EMERGENCY MEDICINE

## 2025-07-18 PROCEDURE — 82077 ASSAY SPEC XCP UR&BREATH IA: CPT | Performed by: EMERGENCY MEDICINE

## 2025-07-18 PROCEDURE — 82140 ASSAY OF AMMONIA: CPT | Performed by: EMERGENCY MEDICINE

## 2025-07-18 PROCEDURE — 76705 ECHO EXAM OF ABDOMEN: CPT

## 2025-07-18 PROCEDURE — 250N000013 HC RX MED GY IP 250 OP 250 PS 637: Performed by: EMERGENCY MEDICINE

## 2025-07-18 PROCEDURE — 82248 BILIRUBIN DIRECT: CPT | Performed by: EMERGENCY MEDICINE

## 2025-07-18 PROCEDURE — 83605 ASSAY OF LACTIC ACID: CPT | Performed by: EMERGENCY MEDICINE

## 2025-07-18 PROCEDURE — 80048 BASIC METABOLIC PNL TOTAL CA: CPT | Performed by: EMERGENCY MEDICINE

## 2025-07-18 PROCEDURE — 74177 CT ABD & PELVIS W/CONTRAST: CPT

## 2025-07-18 PROCEDURE — 85610 PROTHROMBIN TIME: CPT | Performed by: EMERGENCY MEDICINE

## 2025-07-18 PROCEDURE — 83735 ASSAY OF MAGNESIUM: CPT | Performed by: EMERGENCY MEDICINE

## 2025-07-18 PROCEDURE — 250N000011 HC RX IP 250 OP 636: Performed by: EMERGENCY MEDICINE

## 2025-07-18 PROCEDURE — 85004 AUTOMATED DIFF WBC COUNT: CPT | Performed by: EMERGENCY MEDICINE

## 2025-07-18 PROCEDURE — 99285 EMERGENCY DEPT VISIT HI MDM: CPT | Mod: 25 | Performed by: EMERGENCY MEDICINE

## 2025-07-18 PROCEDURE — 83690 ASSAY OF LIPASE: CPT | Performed by: EMERGENCY MEDICINE

## 2025-07-18 PROCEDURE — 36415 COLL VENOUS BLD VENIPUNCTURE: CPT | Performed by: EMERGENCY MEDICINE

## 2025-07-18 RX ORDER — IOPAMIDOL 755 MG/ML
500 INJECTION, SOLUTION INTRAVASCULAR ONCE
Status: COMPLETED | OUTPATIENT
Start: 2025-07-18 | End: 2025-07-18

## 2025-07-18 RX ORDER — POTASSIUM CHLORIDE 1500 MG/1
20 TABLET, EXTENDED RELEASE ORAL 2 TIMES DAILY
Qty: 10 TABLET | Refills: 0 | Status: ON HOLD | OUTPATIENT
Start: 2025-07-18

## 2025-07-18 RX ORDER — POTASSIUM CHLORIDE 1500 MG/1
40 TABLET, EXTENDED RELEASE ORAL ONCE
Status: COMPLETED | OUTPATIENT
Start: 2025-07-18 | End: 2025-07-18

## 2025-07-18 RX ORDER — POTASSIUM CHLORIDE 1500 MG/1
20 TABLET, EXTENDED RELEASE ORAL 2 TIMES DAILY
Qty: 10 TABLET | Refills: 0 | Status: SHIPPED | OUTPATIENT
Start: 2025-07-18 | End: 2025-07-18

## 2025-07-18 RX ORDER — ONDANSETRON 4 MG/1
4 TABLET, ORALLY DISINTEGRATING ORAL EVERY 8 HOURS PRN
Qty: 10 TABLET | Refills: 0 | Status: ON HOLD | OUTPATIENT
Start: 2025-07-18 | End: 2025-07-21

## 2025-07-18 RX ORDER — DIAZEPAM 5 MG/1
5 TABLET ORAL EVERY 6 HOURS PRN
Qty: 20 TABLET | Refills: 0 | Status: ON HOLD | OUTPATIENT
Start: 2025-07-18

## 2025-07-18 RX ORDER — MAGNESIUM SULFATE HEPTAHYDRATE 40 MG/ML
2 INJECTION, SOLUTION INTRAVENOUS ONCE
Status: COMPLETED | OUTPATIENT
Start: 2025-07-18 | End: 2025-07-18

## 2025-07-18 RX ADMIN — SODIUM CHLORIDE 61 ML: 9 INJECTION, SOLUTION INTRAVENOUS at 20:36

## 2025-07-18 RX ADMIN — MAGNESIUM SULFATE HEPTAHYDRATE 2 G: 40 INJECTION, SOLUTION INTRAVENOUS at 19:57

## 2025-07-18 RX ADMIN — IOPAMIDOL 88 ML: 755 INJECTION, SOLUTION INTRAVENOUS at 20:36

## 2025-07-18 RX ADMIN — POTASSIUM CHLORIDE 40 MEQ: 1500 TABLET, EXTENDED RELEASE ORAL at 19:56

## 2025-07-18 ASSESSMENT — COLUMBIA-SUICIDE SEVERITY RATING SCALE - C-SSRS
1. IN THE PAST MONTH, HAVE YOU WISHED YOU WERE DEAD OR WISHED YOU COULD GO TO SLEEP AND NOT WAKE UP?: NO
2. HAVE YOU ACTUALLY HAD ANY THOUGHTS OF KILLING YOURSELF IN THE PAST MONTH?: NO
6. HAVE YOU EVER DONE ANYTHING, STARTED TO DO ANYTHING, OR PREPARED TO DO ANYTHING TO END YOUR LIFE?: NO

## 2025-07-18 ASSESSMENT — ACTIVITIES OF DAILY LIVING (ADL)
ADLS_ACUITY_SCORE: 41

## 2025-07-19 NOTE — ED TRIAGE NOTES
Presents to triage from UC with c/o increased bilirubin. Patient had a near syncope episode at [email or web address] game today. He is a daily drinker with history of ETOH withdrawal. Last drink last night. No seizure hx

## 2025-07-19 NOTE — ED PROVIDER NOTES
Emergency Department Note      History of Present Illness     Chief Complaint   Abnormal Labs (/)      HPI   Carlos A Lopez is a 49 year old male     Presenting with his wife from urgent care for evaluation of 2 episodes of near syncope while at a baseball game.  Both episodes resolved with sitting down and drinking fluids.  No true loss of consciousness or secondary trauma.      Over the last 6 months has lost 40 pounds which has been unintentional.  He has had decreased energy, early satiety/poor appetite.  Denies heavy drinking.  Will drink 3 to 4 days a week and when he chooses to drink it will be 1 to maybe 2 cocktails at the end of the evening.  No other illicit substances.  No known heart or lung problems.  States it has been years since his last regular physical.  No history of gallstones.      Currently feels fine with no lightheadedness, dizziness, chest pain, shortness of breath, abdominal discomfort.  Has not had any melena or hematochezia.  No dysuria frequency or urgency.    About a week ago his wife states that they noticed some yellowish discoloration of the whites of the eyes.    Independent Historian   Significant other     Review of External Notes   See ed course     Past Medical History     Medical History and Problem List         Physical Exam     Patient Vitals for the past 24 hrs:   BP Temp Temp src Pulse Resp SpO2 Weight   07/18/25 2245 (!) 151/87 -- -- 79 -- 94 % --   07/18/25 2230 (!) 146/82 -- -- 80 -- -- --   07/18/25 2229 -- -- -- -- -- 93 % --   07/18/25 2024 -- -- -- -- -- 96 % --   07/18/25 2009 -- -- -- -- -- 97 % --   07/18/25 2000 (!) 149/87 -- -- 83 -- 97 % --   07/18/25 1954 -- -- -- -- -- 97 % --   07/18/25 1945 (!) 144/89 -- -- 85 -- 96 % --   07/18/25 1941 -- -- -- -- -- 96 % --   07/18/25 1940 (!) 153/94 -- -- 86 -- -- --   07/18/25 1926 (!) 152/89 98.3  F (36.8  C) Temporal 84 16 100 % 79.1 kg (174 lb 6.1 oz)       HEENT: Pupils equal, no nystagmus, scleral icterus is  present, sublingual icterus present.  CV: ppi, regular   Resp: speaking in full sentences without any resp distress, no wheezing rhonchi or rales, no tachypnea  Abdomen: No distention, no significant localizing tenderness palpation  Extremity: Skeletal survey unremarkable, no significant peripheral edema.  Skin: warm dry well perfused, icteric  Neuro: Alert, oriented, speech clear, face symmetric, motor intact bilateral bilateral lower extremities, sensation tact light touch all extremities.  Gait stable            Diagnostics     Lab Results   Labs Ordered and Resulted from Time of ED Arrival to Time of ED Departure   INR - Abnormal       Result Value    INR 1.51 (*)     PT 18.2 (*)    BASIC METABOLIC PANEL - Abnormal    Sodium 137      Potassium 2.8 (*)     Chloride 90 (*)     Carbon Dioxide (CO2) 28      Anion Gap 19 (*)     Urea Nitrogen 8.6      Creatinine 0.82      GFR Estimate >90      Calcium 10.3      Glucose 96     HEPATIC FUNCTION PANEL - Abnormal    Protein Total 9.1 (*)     Albumin 3.9      Bilirubin Total 5.1 (*)     Alkaline Phosphatase 161 (*)      (*)     ALT 27      Bilirubin Direct 2.84 (*)    LIPASE - Abnormal    Lipase 80 (*)    LACTIC ACID WHOLE BLOOD WITH 1X REPEAT IN 2 HR WHEN >2 - Abnormal    Lactic Acid, Initial 2.2 (*)    MAGNESIUM - Abnormal    Magnesium 1.1 (*)    ETHANOL LEVEL BLOOD - Abnormal    Ethanol Level Blood 0.23 (*)    KETONE BETA-HYDROXYBUTYRATE QUANTITATIVE, RAPID - Abnormal    Ketone (Beta-Hydroxybutyrate) Quantitative 0.71 (*)    ROUTINE UA WITH MICROSCOPIC REFLEX TO CULTURE - Abnormal    Color Urine Light Yellow      Appearance Urine Clear      Glucose Urine Negative      Bilirubin Urine Negative      Ketones Urine Negative      Specific Gravity Urine 1.024      Blood Urine Negative      pH Urine 7.5 (*)     Protein Albumin Urine Negative      Urobilinogen Urine Normal      Nitrite Urine Negative      Leukocyte Esterase Urine Negative      RBC Urine 1      WBC Urine  <1     CBC WITH PLATELETS AND DIFFERENTIAL - Abnormal    WBC Count 7.9      RBC Count 3.02 (*)     Hemoglobin 11.3 (*)     Hematocrit 31.4 (*)      (*)     MCH 37.4 (*)     MCHC 36.0      RDW 13.5      Platelet Count 77 (*)     % Neutrophils 68      % Lymphocytes 17      % Monocytes 11      % Eosinophils 1      % Basophils 2      % Immature Granulocytes 1      NRBCs per 100 WBC 0      Absolute Neutrophils 5.4      Absolute Lymphocytes 1.4      Absolute Monocytes 0.9      Absolute Eosinophils 0.1      Absolute Basophils 0.2      Absolute Immature Granulocytes 0.0      Absolute NRBCs 0.0     TROPONIN T, HIGH SENSITIVITY - Normal    Troponin T, High Sensitivity 9     AMMONIA - Normal    Ammonia 32     LACTIC ACID WHOLE BLOOD       Imaging   US Abdomen Limited   Final Result   IMPRESSION:   1.  Cholelithiasis and borderline gallbladder wall thickening. No other sonographic evidence of acute cholecystitis.   2.  Hepatic steatosis and mild surface nodularity of the liver, suggesting chronic parenchymal liver disease, including hepatic steatosis with or without fibrosis. No focal hepatic masses visualized.         CT Chest/Abdomen/Pelvis w Contrast   Final Result   IMPRESSION:   1.  Morphologic changes of cirrhosis and portal hypertension.   2.  Distended thick-walled gallbladder with dependent stones. Correlate clinically to exclude cholecystitis and consider ultrasound if warranted.   3.  New right upper lobe groundglass nodule. Adenocarcinoma not excluded. If more recent comparison images are unavailable, six-month follow-up recommended per Fleischner Society guidelines.   4.  Nonspecific left upper quadrant retroperitoneal stranding. Attention at follow-up advised.          EKG   ECG results from 07/18/25   EKG 12-lead, tracing only     Value    Systolic Blood Pressure     Diastolic Blood Pressure     Ventricular Rate 87    Atrial Rate 87    SD Interval 188    QRS Duration 110        QTc 534    P Axis 44     R AXIS -4    T Axis 51    Interpretation ECG      Sinus rhythm  Nonspecific ST abnormality  Prolonged QT  Abnormal ECG  When compared with ECG of 08-Mar-2024 08:12,  T wave amplitude has increased in Anterior leads           Independent Interpretation       ED Course      Medications Administered   Medications   potassium chloride ananya ER (KLOR-CON M20) CR tablet 40 mEq (40 mEq Oral $Given 7/18/25 1956)   magnesium sulfate 2 g in 50 mL sterile water intermittent infusion (0 g Intravenous Stopped 7/18/25 2051)   sodium chloride 0.9 % bag for CT scan flush (61 mLs Intravenous $Given 7/18/25 2036)   iopamidol (ISOVUE-370) solution 500 mL (88 mLs Intravenous $Given 7/18/25 2036)       Procedures   Procedures     Discussion of Management       ED Course   ED Course as of 07/18/25 2319 Fri Jul 18, 2025 1932 I reviewed urgent care note earlier this same day regarding symptoms and referral here to the ED.   1946 Ultrasound abdomen March 2024 suggested underlying cirrhosis, sludge in the gallbladder   1953 ECG 1935: Sinus rhythm, prolonged QT at 534 ms, QRS abnormal but not greater than 120 ms.  No evidence of acute ischemia.       Additional Documentation      Medical Decision Making / Diagnosis     CMS Diagnoses:         [facility]   Carlos A Lopez is a 49 year old male     Presenting with upright lightheadedness and dizziness in the background of this is a 40 pound weight loss, worsening jaundice.  Workup as above shows cirrhosis, hypokalemia,  hypomagnesemia.    No secondary trauma, no localizing neurologic deficits, from a cardiac standpoint I see no high risk EKG changes suggesting a cardiogenic cause of near syncope.  Troponin is not elevated.  As far as the jaundice.  We do not see an obstructing cause of jaundice either from choledocholithiasis or extrinsic compression of the common duct from a pancreatic tumor or obvious cholangiocarcinoma.  He was given fluids as well as the beginnings of  electrolyte replacement here in the emergency department.  Had a patricia discussion with him and his wife regarding alcohol use likely driving the cirrhosis, electrolyte abnormalities, nutritional deficiencies etc.    He currently has capacity to make his own healthcare decisions and would like to go home rather than be admitted here to the hospital for further evaluation and management.  He and his wife understand what is known at this point what is unknown what to watch out for when return to the ED.  He understands he will need follow-up in terms of establishing primary care, gastroenterology, lung nodule clinic follow-up.  They will return with any worsening signs of illness.  We discussed if he chooses to become sober treating alcohol withdrawal and was given medications to help in this regard.    Disposition   The patient was discharged.     Diagnosis     ICD-10-CM    1. Near syncope  R55 ED To Primary Care Referral      2. Alcoholic cirrhosis of liver without ascites (H)  K70.30 Adult GI  Referral - Consult Only     ED To Primary Care Referral      3. Alcohol use disorder  F10.90 ED To Primary Care Referral      4. Hypomagnesemia  E83.42 ED To Primary Care Referral      5. Hypokalemia  E87.6 ED To Primary Care Referral      6. Calculus of gallbladder without cholecystitis without obstruction  K80.20 ED To Primary Care Referral      7. Pulmonary nodule  R91.1 Adult Oncology/Hematology  Referral     ED To Primary Care Referral           Discharge Medications   Discharge Medication List as of 7/18/2025 10:52 PM        START taking these medications    Details   diazepam (VALIUM) 5 MG tablet Take 1 tablet (5 mg) by mouth every 6 hours as needed for anxiety or withdrawal., Disp-20 tablet, R-0, E-Prescribe      ondansetron (ZOFRAN ODT) 4 MG ODT tab Take 1 tablet (4 mg) by mouth every 8 hours as needed for nausea., Disp-10 tablet, R-0, E-Prescribe               Jaquan Garcia MD          Jaqaun Garcia MD  07/18/25 1620

## 2025-07-20 ENCOUNTER — HOSPITAL ENCOUNTER (INPATIENT)
Facility: CLINIC | Age: 50
End: 2025-07-20
Attending: EMERGENCY MEDICINE | Admitting: INTERNAL MEDICINE
Payer: COMMERCIAL

## 2025-07-20 DIAGNOSIS — R63.4 WEIGHT LOSS: ICD-10-CM

## 2025-07-20 DIAGNOSIS — E87.6 HYPOKALEMIA: ICD-10-CM

## 2025-07-20 DIAGNOSIS — F10.932 ALCOHOL WITHDRAWAL SEIZURE WITH PERCEPTUAL DISTURBANCE (H): ICD-10-CM

## 2025-07-20 DIAGNOSIS — D69.6 THROMBOCYTOPENIA: ICD-10-CM

## 2025-07-20 DIAGNOSIS — E87.1 HYPONATREMIA: ICD-10-CM

## 2025-07-20 DIAGNOSIS — R56.9 ALCOHOL WITHDRAWAL SEIZURE WITH PERCEPTUAL DISTURBANCE (H): ICD-10-CM

## 2025-07-20 DIAGNOSIS — E83.39 HYPOPHOSPHATEMIA: ICD-10-CM

## 2025-07-20 DIAGNOSIS — F10.11 HISTORY OF ALCOHOL ABUSE: Primary | ICD-10-CM

## 2025-07-20 DIAGNOSIS — E80.6 HYPERBILIRUBINEMIA: ICD-10-CM

## 2025-07-20 DIAGNOSIS — R94.31 PROLONGED Q-T INTERVAL ON ECG: ICD-10-CM

## 2025-07-20 DIAGNOSIS — K70.30 ALCOHOLIC CIRRHOSIS OF LIVER WITHOUT ASCITES (H): ICD-10-CM

## 2025-07-20 DIAGNOSIS — E83.42 HYPOMAGNESEMIA: ICD-10-CM

## 2025-07-20 LAB
BASOPHILS # BLD AUTO: 0.1 10E3/UL (ref 0–0.2)
BASOPHILS NFR BLD AUTO: 1 %
EOSINOPHIL # BLD AUTO: 0.1 10E3/UL (ref 0–0.7)
EOSINOPHIL NFR BLD AUTO: 2 %
ERYTHROCYTE [DISTWIDTH] IN BLOOD BY AUTOMATED COUNT: 13 % (ref 10–15)
HCT VFR BLD AUTO: 31 % (ref 40–53)
HGB BLD-MCNC: 11.3 G/DL (ref 13.3–17.7)
IMM GRANULOCYTES # BLD: 0 10E3/UL
IMM GRANULOCYTES NFR BLD: 1 %
INR PPP: 2 (ref 0.85–1.15)
LACTATE SERPL-SCNC: 1.4 MMOL/L (ref 0.7–2)
LYMPHOCYTES # BLD AUTO: 1.1 10E3/UL (ref 0.8–5.3)
LYMPHOCYTES NFR BLD AUTO: 14 %
MCH RBC QN AUTO: 37.8 PG (ref 26.5–33)
MCHC RBC AUTO-ENTMCNC: 36.5 G/DL (ref 31.5–36.5)
MCV RBC AUTO: 104 FL (ref 78–100)
MONOCYTES # BLD AUTO: 0.9 10E3/UL (ref 0–1.3)
MONOCYTES NFR BLD AUTO: 12 %
NEUTROPHILS # BLD AUTO: 5.5 10E3/UL (ref 1.6–8.3)
NEUTROPHILS NFR BLD AUTO: 71 %
NRBC # BLD AUTO: 0 10E3/UL
NRBC BLD AUTO-RTO: 0 /100
PLATELET # BLD AUTO: 67 10E3/UL (ref 150–450)
PROTHROMBIN TIME: 22.5 SECONDS (ref 11.8–14.8)
RBC # BLD AUTO: 2.99 10E6/UL (ref 4.4–5.9)
WBC # BLD AUTO: 7.8 10E3/UL (ref 4–11)

## 2025-07-20 PROCEDURE — 96375 TX/PRO/DX INJ NEW DRUG ADDON: CPT

## 2025-07-20 PROCEDURE — 83605 ASSAY OF LACTIC ACID: CPT | Performed by: EMERGENCY MEDICINE

## 2025-07-20 PROCEDURE — 85025 COMPLETE CBC W/AUTO DIFF WBC: CPT | Performed by: EMERGENCY MEDICINE

## 2025-07-20 PROCEDURE — 85610 PROTHROMBIN TIME: CPT | Performed by: EMERGENCY MEDICINE

## 2025-07-20 PROCEDURE — 99285 EMERGENCY DEPT VISIT HI MDM: CPT | Mod: 25

## 2025-07-20 PROCEDURE — 82140 ASSAY OF AMMONIA: CPT | Performed by: EMERGENCY MEDICINE

## 2025-07-20 PROCEDURE — 258N000003 HC RX IP 258 OP 636: Performed by: EMERGENCY MEDICINE

## 2025-07-20 PROCEDURE — 36415 COLL VENOUS BLD VENIPUNCTURE: CPT | Performed by: EMERGENCY MEDICINE

## 2025-07-20 PROCEDURE — 83735 ASSAY OF MAGNESIUM: CPT | Performed by: EMERGENCY MEDICINE

## 2025-07-20 PROCEDURE — 93005 ELECTROCARDIOGRAM TRACING: CPT

## 2025-07-20 PROCEDURE — 250N000013 HC RX MED GY IP 250 OP 250 PS 637: Performed by: EMERGENCY MEDICINE

## 2025-07-20 PROCEDURE — 82077 ASSAY SPEC XCP UR&BREATH IA: CPT | Performed by: EMERGENCY MEDICINE

## 2025-07-20 PROCEDURE — 84100 ASSAY OF PHOSPHORUS: CPT | Performed by: EMERGENCY MEDICINE

## 2025-07-20 PROCEDURE — 250N000011 HC RX IP 250 OP 636: Performed by: EMERGENCY MEDICINE

## 2025-07-20 PROCEDURE — 83690 ASSAY OF LIPASE: CPT | Performed by: EMERGENCY MEDICINE

## 2025-07-20 PROCEDURE — 82247 BILIRUBIN TOTAL: CPT | Performed by: EMERGENCY MEDICINE

## 2025-07-20 RX ORDER — DIAZEPAM 10 MG/2ML
10 INJECTION, SOLUTION INTRAMUSCULAR; INTRAVENOUS ONCE
Status: COMPLETED | OUTPATIENT
Start: 2025-07-20 | End: 2025-07-20

## 2025-07-20 RX ORDER — DIAZEPAM 5 MG/1
10 TABLET ORAL EVERY 30 MIN PRN
Status: DISCONTINUED | OUTPATIENT
Start: 2025-07-20 | End: 2025-07-21

## 2025-07-20 RX ORDER — GABAPENTIN 600 MG/1
1200 TABLET ORAL ONCE
Status: COMPLETED | OUTPATIENT
Start: 2025-07-20 | End: 2025-07-20

## 2025-07-20 RX ORDER — DIAZEPAM 10 MG/2ML
5-10 INJECTION, SOLUTION INTRAMUSCULAR; INTRAVENOUS EVERY 30 MIN PRN
Status: DISCONTINUED | OUTPATIENT
Start: 2025-07-20 | End: 2025-07-21

## 2025-07-20 RX ORDER — DEXTROSE, SODIUM CHLORIDE, SODIUM LACTATE, POTASSIUM CHLORIDE, AND CALCIUM CHLORIDE 5; .6; .31; .03; .02 G/100ML; G/100ML; G/100ML; G/100ML; G/100ML
1000 INJECTION, SOLUTION INTRAVENOUS ONCE
Status: COMPLETED | OUTPATIENT
Start: 2025-07-20 | End: 2025-07-21

## 2025-07-20 RX ORDER — FOLIC ACID 1 MG/1
1 TABLET ORAL ONCE
Status: COMPLETED | OUTPATIENT
Start: 2025-07-20 | End: 2025-07-20

## 2025-07-20 RX ORDER — MULTIPLE VITAMINS W/ MINERALS TAB 9MG-400MCG
1 TAB ORAL ONCE
Status: COMPLETED | OUTPATIENT
Start: 2025-07-20 | End: 2025-07-20

## 2025-07-20 RX ORDER — CLONIDINE HYDROCHLORIDE 0.1 MG/1
0.1 TABLET ORAL EVERY 8 HOURS
Status: DISCONTINUED | OUTPATIENT
Start: 2025-07-20 | End: 2025-07-23

## 2025-07-20 RX ADMIN — Medication 1 TABLET: at 23:39

## 2025-07-20 RX ADMIN — SODIUM CHLORIDE 1000 ML: 9 INJECTION, SOLUTION INTRAVENOUS at 23:40

## 2025-07-20 RX ADMIN — THIAMINE HCL TAB 100 MG 100 MG: 100 TAB at 23:39

## 2025-07-20 RX ADMIN — CLONIDINE HYDROCHLORIDE 0.1 MG: 0.1 TABLET ORAL at 23:39

## 2025-07-20 RX ADMIN — GABAPENTIN 600 MG: 600 TABLET, FILM COATED ORAL at 23:39

## 2025-07-20 RX ADMIN — FOLIC ACID 1 MG: 1 TABLET ORAL at 23:39

## 2025-07-20 RX ADMIN — DIAZEPAM 10 MG: 10 INJECTION, SOLUTION INTRAMUSCULAR; INTRAVENOUS at 23:48

## 2025-07-20 ASSESSMENT — LIFESTYLE VARIABLES
TOTAL SCORE: 13
HEADACHE, FULLNESS IN HEAD: NOT PRESENT
NAUSEA AND VOMITING: MILD NAUSEA WITH NO VOMITING
AGITATION: NORMAL ACTIVITY
PAROXYSMAL SWEATS: NO SWEAT VISIBLE
AUDITORY DISTURBANCES: MODERATELY SEVERE HALLUCINATIONS
TACTILE DISTURBANCES: MODERATELY SEVERE HALLUCINATIONS
TREMOR: NOT VISIBLE, BUT CAN BE FELT FINGERTIP TO FINGERTIP
ANXIETY: NO ANXIETY, AT EASE
VISUAL DISTURBANCES: MODERATE SENSITIVITY
ORIENTATION AND CLOUDING OF SENSORIUM: ORIENTED AND CAN DO SERIAL ADDITIONS

## 2025-07-20 ASSESSMENT — COLUMBIA-SUICIDE SEVERITY RATING SCALE - C-SSRS
6. HAVE YOU EVER DONE ANYTHING, STARTED TO DO ANYTHING, OR PREPARED TO DO ANYTHING TO END YOUR LIFE?: NO
1. IN THE PAST MONTH, HAVE YOU WISHED YOU WERE DEAD OR WISHED YOU COULD GO TO SLEEP AND NOT WAKE UP?: NO
2. HAVE YOU ACTUALLY HAD ANY THOUGHTS OF KILLING YOURSELF IN THE PAST MONTH?: NO

## 2025-07-21 ENCOUNTER — PATIENT OUTREACH (OUTPATIENT)
Dept: ONCOLOGY | Facility: CLINIC | Age: 50
End: 2025-07-21
Payer: COMMERCIAL

## 2025-07-21 PROBLEM — E83.39 HYPOPHOSPHATEMIA: Status: ACTIVE | Noted: 2025-07-21

## 2025-07-21 PROBLEM — F10.932 ALCOHOL WITHDRAWAL SEIZURE WITH PERCEPTUAL DISTURBANCE (H): Status: ACTIVE | Noted: 2025-07-21

## 2025-07-21 PROBLEM — R56.9 ALCOHOL WITHDRAWAL SEIZURE WITH PERCEPTUAL DISTURBANCE (H): Status: ACTIVE | Noted: 2025-07-21

## 2025-07-21 PROBLEM — E87.1 HYPONATREMIA: Status: ACTIVE | Noted: 2025-07-21

## 2025-07-21 PROBLEM — E80.6 HYPERBILIRUBINEMIA: Status: ACTIVE | Noted: 2025-07-21

## 2025-07-21 PROBLEM — R63.4 WEIGHT LOSS: Status: ACTIVE | Noted: 2025-07-21

## 2025-07-21 PROBLEM — K70.30 ALCOHOLIC CIRRHOSIS OF LIVER WITHOUT ASCITES (H): Status: ACTIVE | Noted: 2025-07-21

## 2025-07-21 PROBLEM — D69.6 THROMBOCYTOPENIA: Status: ACTIVE | Noted: 2025-07-21

## 2025-07-21 LAB
ALBUMIN SERPL BCG-MCNC: 3.4 G/DL (ref 3.5–5.2)
ALBUMIN SERPL BCG-MCNC: 3.8 G/DL (ref 3.5–5.2)
ALBUMIN UR-MCNC: NEGATIVE MG/DL
ALP SERPL-CCNC: 126 U/L (ref 40–150)
ALP SERPL-CCNC: 149 U/L (ref 40–150)
ALT SERPL W P-5'-P-CCNC: 24 U/L (ref 0–70)
ALT SERPL W P-5'-P-CCNC: 28 U/L (ref 0–70)
AMMONIA PLAS-SCNC: 54 UMOL/L (ref 16–60)
AMMONIA PLAS-SCNC: 61 UMOL/L (ref 16–60)
ANION GAP SERPL CALCULATED.3IONS-SCNC: 10 MMOL/L (ref 7–15)
ANION GAP SERPL CALCULATED.3IONS-SCNC: 12 MMOL/L (ref 7–15)
APPEARANCE UR: CLEAR
AST SERPL W P-5'-P-CCNC: 150 U/L (ref 0–45)
AST SERPL W P-5'-P-CCNC: 176 U/L (ref 0–45)
ATRIAL RATE - MUSE: 87 BPM
ATRIAL RATE - MUSE: 95 BPM
BILIRUB SERPL-MCNC: 6.1 MG/DL
BILIRUB SERPL-MCNC: 7.1 MG/DL
BILIRUB UR QL STRIP: NEGATIVE
BUN SERPL-MCNC: 7.1 MG/DL (ref 6–20)
BUN SERPL-MCNC: 8.5 MG/DL (ref 6–20)
CALCIUM SERPL-MCNC: 10.4 MG/DL (ref 8.8–10.4)
CALCIUM SERPL-MCNC: 9.8 MG/DL (ref 8.8–10.4)
CHLORIDE SERPL-SCNC: 86 MMOL/L (ref 98–107)
CHLORIDE SERPL-SCNC: 95 MMOL/L (ref 98–107)
COLOR UR AUTO: YELLOW
CREAT SERPL-MCNC: 0.65 MG/DL (ref 0.67–1.17)
CREAT SERPL-MCNC: 0.68 MG/DL (ref 0.67–1.17)
DIASTOLIC BLOOD PRESSURE - MUSE: NORMAL MMHG
DIASTOLIC BLOOD PRESSURE - MUSE: NORMAL MMHG
EGFRCR SERPLBLD CKD-EPI 2021: >90 ML/MIN/1.73M2
EGFRCR SERPLBLD CKD-EPI 2021: >90 ML/MIN/1.73M2
ERYTHROCYTE [DISTWIDTH] IN BLOOD BY AUTOMATED COUNT: 13.2 % (ref 10–15)
ETHANOL SERPL-MCNC: <0.01 G/DL
GLUCOSE SERPL-MCNC: 105 MG/DL (ref 70–99)
GLUCOSE SERPL-MCNC: 108 MG/DL (ref 70–99)
GLUCOSE UR STRIP-MCNC: NEGATIVE MG/DL
HBV CORE AB SERPL QL IA: REACTIVE
HBV SURFACE AB SERPL IA-ACNC: 419 M[IU]/ML
HBV SURFACE AB SERPL IA-ACNC: REACTIVE M[IU]/ML
HBV SURFACE AG SERPL QL IA: NONREACTIVE
HCO3 SERPL-SCNC: 27 MMOL/L (ref 22–29)
HCO3 SERPL-SCNC: 28 MMOL/L (ref 22–29)
HCT VFR BLD AUTO: 28.1 % (ref 40–53)
HCV AB SERPL QL IA: NONREACTIVE
HGB BLD-MCNC: 9.6 G/DL (ref 13.3–17.7)
HGB BLD-MCNC: 9.9 G/DL (ref 13.3–17.7)
HGB UR QL STRIP: NEGATIVE
HOLD SPECIMEN: NORMAL
INTERPRETATION ECG - MUSE: NORMAL
INTERPRETATION ECG - MUSE: NORMAL
KETONES UR STRIP-MCNC: NEGATIVE MG/DL
LEUKOCYTE ESTERASE UR QL STRIP: NEGATIVE
LIPASE SERPL-CCNC: 120 U/L (ref 13–60)
MAGNESIUM SERPL-MCNC: 1 MG/DL (ref 1.7–2.3)
MAGNESIUM SERPL-MCNC: 1.4 MG/DL (ref 1.7–2.3)
MAGNESIUM SERPL-MCNC: 2.1 MG/DL (ref 1.7–2.3)
MCH RBC QN AUTO: 37.2 PG (ref 26.5–33)
MCHC RBC AUTO-ENTMCNC: 35.2 G/DL (ref 31.5–36.5)
MCV RBC AUTO: 106 FL (ref 78–100)
MCV RBC AUTO: 108 FL (ref 78–100)
NITRATE UR QL: NEGATIVE
P AXIS - MUSE: 44 DEGREES
P AXIS - MUSE: 61 DEGREES
PH UR STRIP: 7.5 [PH] (ref 5–7)
PHOSPHATE SERPL-MCNC: 2.3 MG/DL (ref 2.5–4.5)
PHOSPHATE SERPL-MCNC: 3.6 MG/DL (ref 2.5–4.5)
PLATELET # BLD AUTO: 66 10E3/UL (ref 150–450)
POTASSIUM SERPL-SCNC: 3 MMOL/L (ref 3.4–5.3)
POTASSIUM SERPL-SCNC: 3.2 MMOL/L (ref 3.4–5.3)
POTASSIUM SERPL-SCNC: 3.5 MMOL/L (ref 3.4–5.3)
PR INTERVAL - MUSE: 186 MS
PR INTERVAL - MUSE: 188 MS
PROT SERPL-MCNC: 7.8 G/DL (ref 6.4–8.3)
PROT SERPL-MCNC: 8.8 G/DL (ref 6.4–8.3)
QRS DURATION - MUSE: 108 MS
QRS DURATION - MUSE: 110 MS
QT - MUSE: 414 MS
QT - MUSE: 444 MS
QTC - MUSE: 520 MS
QTC - MUSE: 534 MS
R AXIS - MUSE: -4 DEGREES
R AXIS - MUSE: -6 DEGREES
RBC # BLD AUTO: 2.66 10E6/UL (ref 4.4–5.9)
RBC URINE: <1 /HPF
SODIUM SERPL-SCNC: 126 MMOL/L (ref 135–145)
SODIUM SERPL-SCNC: 132 MMOL/L (ref 135–145)
SP GR UR STRIP: 1 (ref 1–1.03)
SYSTOLIC BLOOD PRESSURE - MUSE: NORMAL MMHG
SYSTOLIC BLOOD PRESSURE - MUSE: NORMAL MMHG
T AXIS - MUSE: 30 DEGREES
T AXIS - MUSE: 51 DEGREES
UROBILINOGEN UR STRIP-MCNC: NORMAL MG/DL
VENTRICULAR RATE- MUSE: 87 BPM
VENTRICULAR RATE- MUSE: 95 BPM
WBC # BLD AUTO: 7.2 10E3/UL (ref 4–11)
WBC URINE: 0 /HPF

## 2025-07-21 PROCEDURE — 86803 HEPATITIS C AB TEST: CPT | Performed by: STUDENT IN AN ORGANIZED HEALTH CARE EDUCATION/TRAINING PROGRAM

## 2025-07-21 PROCEDURE — 82310 ASSAY OF CALCIUM: CPT | Performed by: INTERNAL MEDICINE

## 2025-07-21 PROCEDURE — 120N000001 HC R&B MED SURG/OB

## 2025-07-21 PROCEDURE — 250N000013 HC RX MED GY IP 250 OP 250 PS 637: Performed by: EMERGENCY MEDICINE

## 2025-07-21 PROCEDURE — 86706 HEP B SURFACE ANTIBODY: CPT | Performed by: STUDENT IN AN ORGANIZED HEALTH CARE EDUCATION/TRAINING PROGRAM

## 2025-07-21 PROCEDURE — 87340 HEPATITIS B SURFACE AG IA: CPT | Performed by: STUDENT IN AN ORGANIZED HEALTH CARE EDUCATION/TRAINING PROGRAM

## 2025-07-21 PROCEDURE — 96365 THER/PROPH/DIAG IV INF INIT: CPT

## 2025-07-21 PROCEDURE — 96375 TX/PRO/DX INJ NEW DRUG ADDON: CPT

## 2025-07-21 PROCEDURE — 36415 COLL VENOUS BLD VENIPUNCTURE: CPT | Performed by: HOSPITALIST

## 2025-07-21 PROCEDURE — 85027 COMPLETE CBC AUTOMATED: CPT | Performed by: INTERNAL MEDICINE

## 2025-07-21 PROCEDURE — 82140 ASSAY OF AMMONIA: CPT | Performed by: INTERNAL MEDICINE

## 2025-07-21 PROCEDURE — 96361 HYDRATE IV INFUSION ADD-ON: CPT

## 2025-07-21 PROCEDURE — 81001 URINALYSIS AUTO W/SCOPE: CPT | Performed by: EMERGENCY MEDICINE

## 2025-07-21 PROCEDURE — 250N000009 HC RX 250: Performed by: INTERNAL MEDICINE

## 2025-07-21 PROCEDURE — 250N000011 HC RX IP 250 OP 636: Performed by: EMERGENCY MEDICINE

## 2025-07-21 PROCEDURE — 85018 HEMOGLOBIN: CPT | Performed by: HOSPITALIST

## 2025-07-21 PROCEDURE — 84100 ASSAY OF PHOSPHORUS: CPT | Performed by: INTERNAL MEDICINE

## 2025-07-21 PROCEDURE — 36415 COLL VENOUS BLD VENIPUNCTURE: CPT | Performed by: INTERNAL MEDICINE

## 2025-07-21 PROCEDURE — 96376 TX/PRO/DX INJ SAME DRUG ADON: CPT

## 2025-07-21 PROCEDURE — 84132 ASSAY OF SERUM POTASSIUM: CPT | Performed by: HOSPITALIST

## 2025-07-21 PROCEDURE — 83735 ASSAY OF MAGNESIUM: CPT | Performed by: INTERNAL MEDICINE

## 2025-07-21 PROCEDURE — 83735 ASSAY OF MAGNESIUM: CPT | Performed by: HOSPITALIST

## 2025-07-21 PROCEDURE — 250N000013 HC RX MED GY IP 250 OP 250 PS 637: Performed by: INTERNAL MEDICINE

## 2025-07-21 PROCEDURE — 250N000011 HC RX IP 250 OP 636: Performed by: INTERNAL MEDICINE

## 2025-07-21 PROCEDURE — 250N000011 HC RX IP 250 OP 636: Performed by: HOSPITALIST

## 2025-07-21 PROCEDURE — 250N000013 HC RX MED GY IP 250 OP 250 PS 637: Performed by: HOSPITALIST

## 2025-07-21 PROCEDURE — 86704 HEP B CORE ANTIBODY TOTAL: CPT | Performed by: STUDENT IN AN ORGANIZED HEALTH CARE EDUCATION/TRAINING PROGRAM

## 2025-07-21 PROCEDURE — 99223 1ST HOSP IP/OBS HIGH 75: CPT | Performed by: INTERNAL MEDICINE

## 2025-07-21 RX ORDER — GABAPENTIN 600 MG/1
600 TABLET ORAL ONCE
Status: COMPLETED | OUTPATIENT
Start: 2025-07-21 | End: 2025-07-21

## 2025-07-21 RX ORDER — GABAPENTIN 300 MG/1
600 CAPSULE ORAL EVERY 8 HOURS
Status: DISCONTINUED | OUTPATIENT
Start: 2025-07-24 | End: 2025-07-23

## 2025-07-21 RX ORDER — LACTULOSE 10 G/15ML
20 SOLUTION ORAL 2 TIMES DAILY
Status: DISCONTINUED | OUTPATIENT
Start: 2025-07-21 | End: 2025-07-26 | Stop reason: HOSPADM

## 2025-07-21 RX ORDER — DIAZEPAM 5 MG/1
10 TABLET ORAL EVERY 30 MIN PRN
Status: DISCONTINUED | OUTPATIENT
Start: 2025-07-21 | End: 2025-07-26 | Stop reason: HOSPADM

## 2025-07-21 RX ORDER — AMOXICILLIN 250 MG
1 CAPSULE ORAL 2 TIMES DAILY PRN
Status: DISCONTINUED | OUTPATIENT
Start: 2025-07-21 | End: 2025-07-26 | Stop reason: HOSPADM

## 2025-07-21 RX ORDER — ACETAMINOPHEN 325 MG/1
650 TABLET ORAL EVERY 4 HOURS PRN
Status: DISCONTINUED | OUTPATIENT
Start: 2025-07-21 | End: 2025-07-26 | Stop reason: HOSPADM

## 2025-07-21 RX ORDER — FLUMAZENIL 0.1 MG/ML
0.2 INJECTION, SOLUTION INTRAVENOUS
Status: DISCONTINUED | OUTPATIENT
Start: 2025-07-21 | End: 2025-07-26 | Stop reason: HOSPADM

## 2025-07-21 RX ORDER — LIDOCAINE 40 MG/G
CREAM TOPICAL
Status: DISCONTINUED | OUTPATIENT
Start: 2025-07-21 | End: 2025-07-26 | Stop reason: HOSPADM

## 2025-07-21 RX ORDER — POTASSIUM CHLORIDE 7.45 MG/ML
10 INJECTION INTRAVENOUS ONCE
Status: COMPLETED | OUTPATIENT
Start: 2025-07-21 | End: 2025-07-21

## 2025-07-21 RX ORDER — MAGNESIUM SULFATE HEPTAHYDRATE 40 MG/ML
4 INJECTION, SOLUTION INTRAVENOUS ONCE
Status: COMPLETED | OUTPATIENT
Start: 2025-07-21 | End: 2025-07-21

## 2025-07-21 RX ORDER — ACETAMINOPHEN 650 MG/1
650 SUPPOSITORY RECTAL EVERY 4 HOURS PRN
Status: DISCONTINUED | OUTPATIENT
Start: 2025-07-21 | End: 2025-07-26 | Stop reason: HOSPADM

## 2025-07-21 RX ORDER — SODIUM CHLORIDE AND POTASSIUM CHLORIDE 150; 900 MG/100ML; MG/100ML
INJECTION, SOLUTION INTRAVENOUS CONTINUOUS
Status: DISCONTINUED | OUTPATIENT
Start: 2025-07-21 | End: 2025-07-22

## 2025-07-21 RX ORDER — GABAPENTIN 100 MG/1
100 CAPSULE ORAL EVERY 8 HOURS
Status: DISCONTINUED | OUTPATIENT
Start: 2025-07-28 | End: 2025-07-23

## 2025-07-21 RX ORDER — MULTIPLE VITAMINS W/ MINERALS TAB 9MG-400MCG
1 TAB ORAL DAILY
Status: DISCONTINUED | OUTPATIENT
Start: 2025-07-21 | End: 2025-07-26 | Stop reason: HOSPADM

## 2025-07-21 RX ORDER — GABAPENTIN 300 MG/1
300 CAPSULE ORAL EVERY 8 HOURS
Status: DISCONTINUED | OUTPATIENT
Start: 2025-07-26 | End: 2025-07-23

## 2025-07-21 RX ORDER — MAGNESIUM SULFATE HEPTAHYDRATE 40 MG/ML
2 INJECTION, SOLUTION INTRAVENOUS ONCE
Status: COMPLETED | OUTPATIENT
Start: 2025-07-21 | End: 2025-07-21

## 2025-07-21 RX ORDER — POTASSIUM CHLORIDE 1500 MG/1
20 TABLET, EXTENDED RELEASE ORAL ONCE
Status: COMPLETED | OUTPATIENT
Start: 2025-07-21 | End: 2025-07-21

## 2025-07-21 RX ORDER — CALCIUM CARBONATE 500 MG/1
1000 TABLET, CHEWABLE ORAL 4 TIMES DAILY PRN
Status: DISCONTINUED | OUTPATIENT
Start: 2025-07-21 | End: 2025-07-26 | Stop reason: HOSPADM

## 2025-07-21 RX ORDER — PROCHLORPERAZINE MALEATE 5 MG/1
10 TABLET ORAL EVERY 6 HOURS PRN
Status: DISCONTINUED | OUTPATIENT
Start: 2025-07-21 | End: 2025-07-26 | Stop reason: HOSPADM

## 2025-07-21 RX ORDER — AMOXICILLIN 250 MG
2 CAPSULE ORAL 2 TIMES DAILY PRN
Status: DISCONTINUED | OUTPATIENT
Start: 2025-07-21 | End: 2025-07-26 | Stop reason: HOSPADM

## 2025-07-21 RX ORDER — POTASSIUM CHLORIDE 1500 MG/1
40 TABLET, EXTENDED RELEASE ORAL ONCE
Status: COMPLETED | OUTPATIENT
Start: 2025-07-21 | End: 2025-07-21

## 2025-07-21 RX ORDER — PHYTONADIONE 5 MG/1
5 TABLET ORAL ONCE
Status: DISCONTINUED | OUTPATIENT
Start: 2025-07-21 | End: 2025-07-21

## 2025-07-21 RX ORDER — DIAZEPAM 10 MG/2ML
5-10 INJECTION, SOLUTION INTRAMUSCULAR; INTRAVENOUS EVERY 30 MIN PRN
Status: DISCONTINUED | OUTPATIENT
Start: 2025-07-21 | End: 2025-07-26 | Stop reason: HOSPADM

## 2025-07-21 RX ORDER — LACTULOSE 10 G/15ML
20 SOLUTION ORAL ONCE
Status: COMPLETED | OUTPATIENT
Start: 2025-07-21 | End: 2025-07-21

## 2025-07-21 RX ORDER — ONDANSETRON 2 MG/ML
4 INJECTION INTRAMUSCULAR; INTRAVENOUS EVERY 6 HOURS PRN
Status: DISCONTINUED | OUTPATIENT
Start: 2025-07-21 | End: 2025-07-26 | Stop reason: HOSPADM

## 2025-07-21 RX ORDER — FOLIC ACID 1 MG/1
1 TABLET ORAL DAILY
Status: DISCONTINUED | OUTPATIENT
Start: 2025-07-21 | End: 2025-07-26 | Stop reason: HOSPADM

## 2025-07-21 RX ORDER — ONDANSETRON 4 MG/1
4 TABLET, ORALLY DISINTEGRATING ORAL EVERY 6 HOURS PRN
Status: DISCONTINUED | OUTPATIENT
Start: 2025-07-21 | End: 2025-07-26 | Stop reason: HOSPADM

## 2025-07-21 RX ORDER — GABAPENTIN 300 MG/1
900 CAPSULE ORAL EVERY 8 HOURS
Status: DISCONTINUED | OUTPATIENT
Start: 2025-07-21 | End: 2025-07-23

## 2025-07-21 RX ADMIN — PANTOPRAZOLE SODIUM 40 MG: 40 INJECTION, POWDER, LYOPHILIZED, FOR SOLUTION INTRAVENOUS at 20:22

## 2025-07-21 RX ADMIN — Medication 1 TABLET: at 09:09

## 2025-07-21 RX ADMIN — SODIUM CHLORIDE AND POTASSIUM CHLORIDE: .9; .15 SOLUTION INTRAVENOUS at 04:59

## 2025-07-21 RX ADMIN — SODIUM CHLORIDE AND POTASSIUM CHLORIDE: .9; .15 SOLUTION INTRAVENOUS at 19:26

## 2025-07-21 RX ADMIN — DIAZEPAM 5 MG: 5 INJECTION INTRAMUSCULAR; INTRAVENOUS at 22:27

## 2025-07-21 RX ADMIN — GABAPENTIN 600 MG: 600 TABLET, FILM COATED ORAL at 00:19

## 2025-07-21 RX ADMIN — MAGNESIUM SULFATE HEPTAHYDRATE 4 G: 40 INJECTION, SOLUTION INTRAVENOUS at 09:09

## 2025-07-21 RX ADMIN — CLONIDINE HYDROCHLORIDE 0.1 MG: 0.1 TABLET ORAL at 17:13

## 2025-07-21 RX ADMIN — THIAMINE HCL TAB 100 MG 100 MG: 100 TAB at 09:09

## 2025-07-21 RX ADMIN — DIAZEPAM 10 MG: 10 INJECTION, SOLUTION INTRAMUSCULAR; INTRAVENOUS at 00:27

## 2025-07-21 RX ADMIN — MAGNESIUM SULFATE HEPTAHYDRATE 2 G: 40 INJECTION, SOLUTION INTRAVENOUS at 00:19

## 2025-07-21 RX ADMIN — PHYTONADIONE 5 MG: 10 INJECTION, EMULSION INTRAMUSCULAR; INTRAVENOUS; SUBCUTANEOUS at 03:41

## 2025-07-21 RX ADMIN — FOLIC ACID 1 MG: 1 TABLET ORAL at 09:08

## 2025-07-21 RX ADMIN — POTASSIUM CHLORIDE 20 MEQ: 20 TABLET, EXTENDED RELEASE ORAL at 11:52

## 2025-07-21 RX ADMIN — GABAPENTIN 900 MG: 300 CAPSULE ORAL at 17:13

## 2025-07-21 RX ADMIN — PANTOPRAZOLE SODIUM 40 MG: 40 INJECTION, POWDER, LYOPHILIZED, FOR SOLUTION INTRAVENOUS at 06:28

## 2025-07-21 RX ADMIN — LACTULOSE 20 G: 20 SOLUTION ORAL at 20:22

## 2025-07-21 RX ADMIN — LACTULOSE 20 G: 20 SOLUTION ORAL at 09:09

## 2025-07-21 RX ADMIN — CLONIDINE HYDROCHLORIDE 0.1 MG: 0.1 TABLET ORAL at 09:09

## 2025-07-21 RX ADMIN — GABAPENTIN 900 MG: 300 CAPSULE ORAL at 11:52

## 2025-07-21 RX ADMIN — POTASSIUM CHLORIDE 10 MEQ: 7.46 INJECTION, SOLUTION INTRAVENOUS at 01:01

## 2025-07-21 RX ADMIN — DEXTROSE, SODIUM CHLORIDE, SODIUM LACTATE, POTASSIUM CHLORIDE, AND CALCIUM CHLORIDE 1000 ML: 5; .6; .31; .03; .02 INJECTION, SOLUTION INTRAVENOUS at 00:27

## 2025-07-21 RX ADMIN — POTASSIUM CHLORIDE 40 MEQ: 20 TABLET, EXTENDED RELEASE ORAL at 09:09

## 2025-07-21 ASSESSMENT — LIFESTYLE VARIABLES
TREMOR: NO TREMOR
HEADACHE, FULLNESS IN HEAD: NOT PRESENT
NAUSEA AND VOMITING: NO NAUSEA AND NO VOMITING
PAROXYSMAL SWEATS: NO SWEAT VISIBLE
AUDITORY DISTURBANCES: MODERATELY SEVERE HALLUCINATIONS
VISUAL DISTURBANCES: MODERATE SENSITIVITY
ORIENTATION AND CLOUDING OF SENSORIUM: ORIENTED AND CAN DO SERIAL ADDITIONS
ORIENTATION AND CLOUDING OF SENSORIUM: CANNOT DO SERIAL ADDITIONS OR IS UNCERTAIN ABOUT DATE
ANXIETY: NO ANXIETY, AT EASE
TREMOR: NO TREMOR
AGITATION: SOMEWHAT MORE THAN NORMAL ACTIVITY
HEADACHE, FULLNESS IN HEAD: NOT PRESENT
HEADACHE, FULLNESS IN HEAD: NOT PRESENT
AGITATION: NORMAL ACTIVITY
NAUSEA AND VOMITING: NO NAUSEA AND NO VOMITING
TOTAL SCORE: 3
AUDITORY DISTURBANCES: NOT PRESENT
NAUSEA AND VOMITING: NO NAUSEA AND NO VOMITING
TREMOR: NOT VISIBLE, BUT CAN BE FELT FINGERTIP TO FINGERTIP
TOTAL SCORE: 1
PAROXYSMAL SWEATS: BARELY PERCEPTIBLE SWEATING, PALMS MOIST
PAROXYSMAL SWEATS: NO SWEAT VISIBLE
ORIENTATION AND CLOUDING OF SENSORIUM: CANNOT DO SERIAL ADDITIONS OR IS UNCERTAIN ABOUT DATE
ANXIETY: MILDLY ANXIOUS
VISUAL DISTURBANCES: VERY MILD SENSITIVITY
TACTILE DISTURBANCES: MODERATELY SEVERE HALLUCINATIONS
AGITATION: NORMAL ACTIVITY
VISUAL DISTURBANCES: MILD SENSITIVITY
ANXIETY: NO ANXIETY, AT EASE
AUDITORY DISTURBANCES: NOT PRESENT
TOTAL SCORE: 16

## 2025-07-21 ASSESSMENT — ACTIVITIES OF DAILY LIVING (ADL)
DIFFICULTY_EATING/SWALLOWING: NO
ADLS_ACUITY_SCORE: 41
ADLS_ACUITY_SCORE: 29
ADLS_ACUITY_SCORE: 46
ADLS_ACUITY_SCORE: 29
HEARING_DIFFICULTY_OR_DEAF: NO
TOILETING_ISSUES: NO
CONCENTRATING,_REMEMBERING_OR_MAKING_DECISIONS_DIFFICULTY: NO
ADLS_ACUITY_SCORE: 41
FALL_HISTORY_WITHIN_LAST_SIX_MONTHS: NO
ADLS_ACUITY_SCORE: 26
ADLS_ACUITY_SCORE: 45
ADLS_ACUITY_SCORE: 30
ADLS_ACUITY_SCORE: 29
ADLS_ACUITY_SCORE: 49
ADLS_ACUITY_SCORE: 30
VISION_MANAGEMENT: READERS
ADLS_ACUITY_SCORE: 41
ADLS_ACUITY_SCORE: 45
CHANGE_IN_FUNCTIONAL_STATUS_SINCE_ONSET_OF_CURRENT_ILLNESS/INJURY: NO
ADLS_ACUITY_SCORE: 26
DIFFICULTY_COMMUNICATING: NO
ADLS_ACUITY_SCORE: 29
ADLS_ACUITY_SCORE: 26
DRESSING/BATHING_DIFFICULTY: NO
ADLS_ACUITY_SCORE: 45
ADLS_ACUITY_SCORE: 41
ADLS_ACUITY_SCORE: 29
WEAR_GLASSES_OR_BLIND: YES
ADLS_ACUITY_SCORE: 45
ADLS_ACUITY_SCORE: 30
ADLS_ACUITY_SCORE: 45
DOING_ERRANDS_INDEPENDENTLY_DIFFICULTY: NO
WALKING_OR_CLIMBING_STAIRS_DIFFICULTY: NO
ADLS_ACUITY_SCORE: 49

## 2025-07-21 NOTE — PROGRESS NOTES
New IP (Interventional Pulmonology) referral rec'd.  Chart reviewed.        New Patient: Interventional Pulmonary (Lung nodule) Nurse Navigator Note    [facility]    Referred to (specialty): Interventional Pulmonary (Lung nodule)    Requested provider (if applicable): n/a    Date Referral Received: 7/21/2025    Evaluation for:  lung nodule on CT imaging incidentally    Clinical History (per Nurse review of records provided):    **BOOK MARKED**    EXAM: CT CHEST/ABDOMEN/PELVIS W CONTRAST  LOCATION: [facility]  DATE: 7/18/2025     INDICATION: 40 pound unintended weight loss, jaundice, near syncope, suspicion for underlying malignancy  COMPARISON: 2/2/2021  TECHNIQUE: CT scan of the chest, abdomen, and pelvis was performed following injection of IV contrast. Multiplanar reformats were obtained. Dose reduction techniques were used.   CONTRAST: 88mL Isovue 370     FINDINGS:   LUNGS AND PLEURA: 13 mm right upper lobe ground glass nodule, new in the interval, image 64. Right middle lobe bronchiectasis, increased in interval. Calcified right lower lobe granuloma.     MEDIASTINUM/AXILLAE: No significant mediastinal or hilar adenopathy. Normal heart size.     CORONARY ARTERY CALCIFICATION: Severe.     HEPATOBILIARY: Hepatomegaly. New diffuse hepatic nodularity compatible with cirrhosis. Gallbladder is moderately distended and mildly thick-walled with numerous stones. No ductal dilatation. Recanalized paraumbilical vein.     PANCREAS: Unremarkable     SPLEEN: Spleen has increased in size now measuring 13.9 cm in AP dimension.     ADRENAL GLANDS: Unremarkable     KIDNEYS/BLADDER: No hydronephrosis. Chronic bladder wall thickening, likely related to chronic outlet obstruction.     BOWEL: No small bowel obstruction. Anastomotic suture line in the right midabdomen status post partial colectomy. Uncomplicated colonic  diverticula.     LYMPH NODES: Scattered subcentimeter reactive upper abdominal lymph nodes. Nonspecific stranding in the retroperitoneum, greatest in the left upper quadrant, image 167, series 3.     VASCULATURE: Moderate atherosclerotic disease without abdominal aortic aneurysm. Patent portal vein. Recanalized paraumbilical vein, with prominent anterior abdominal collaterals. Increasing perirectal collaterals.     PELVIC ORGANS: Mild prostatic hypertrophy. No free fluid.     MUSCULOSKELETAL: No acute bony abnormalities                                                                      IMPRESSION:  1.  Morphologic changes of cirrhosis and portal hypertension.  2.  Distended thick-walled gallbladder with dependent stones. Correlate clinically to exclude cholecystitis and consider ultrasound if warranted.  3.  New right upper lobe groundglass nodule. Adenocarcinoma not excluded. If more recent comparison images are unavailable, six-month follow-up recommended per Fleischner Society guidelines.  4.  Nonspecific left upper quadrant retroperitoneal stranding. Attention at follow-up advised.     Records Location: [facility]     RECORDS NEEDED:  12/7/2016 to p[resent all CHEST imaging pushed to PACS from --thank you!!    Additional testing needed prior to consult: none

## 2025-07-21 NOTE — ED NOTES
"[facility]  ED Nurse Handoff Report    ED Chief complaint: Withdrawal  . ED Diagnosis:   Final diagnoses:   Alcohol withdrawal seizure with perceptual disturbance (H)   Hypomagnesemia   Hypophosphatemia   Thrombocytopenia   Weight loss   Alcoholic cirrhosis of liver        Allergies: No Known Allergies    Code Status: Full Code    Activity level - Baseline/Home:  independent.  Activity Level - Current:   assist of 1.   Lift room needed: No.   Bariatric: No   Needed: No   Isolation: No.   Infection: Not Applicable.     Respiratory status: Room air    Vital Signs (within 30 minutes):   Vitals:    07/21/25 0030 07/21/25 0031 07/21/25 0032 07/21/25 0033   BP: (!) 150/90      Pulse: 90 89 91 92   Resp:       Temp:       TempSrc:       SpO2: 95% 93% 94% 94%   Weight:       Height:           Cardiac Rhythm:  ,      Pain level:    Patient confused: Yes. At times  Patient Falls Risk: patient and family education.   Elimination Status: Has voided     Patient Report - Initial Complaint: . Carlos A Lopez is a 49 year old male who presents with his wife due to concerns of alcohol withdrawal symptoms.  He was actually evaluated after syncopal episodes 2 days ago.  At that visit his blood alcohol was 0.23 and he had multiple electrolyte abnormalities.  Admission to the hospital was recommended but he declined.  He has not had any drink since that time.  His wife describes shakes and tremors that started on Saturday.  This morning he started having hallucinations and continues to have them.  He describes \"multiple nonspeaking women that were in the car\" with him on the ride here.  He also describes \"a group of 35 women who were at his house swinging from [employer] and then cleaning his house.\"  He is convinced that these were real events.  He very much minimizes his alcohol intake stating he only has 2 drinks occasionally.  No new falls or trauma.  No fevers.  No vomiting.  He denies chest pain " or shortness of breath.  His wife states that the yellow in his eyes was first noticed several weeks ago.  He is also lost 40 pounds in the last year to year and a half.  Imaging from 7/18 reviewed and noted below.  Wife states she has been giving Valium at home which was prescribed at discharge from the ER.  She initially felt this was helping but is concerned for his safety at this point.   Focused Assessment: ETOH withdrawal.     Abnormal Results:   Labs Ordered and Resulted from Time of ED Arrival to Time of ED Departure   INR - Abnormal       Result Value    INR 2.00 (*)     PT 22.5 (*)    MAGNESIUM - Abnormal    Magnesium 1.0 (*)    PHOSPHORUS - Abnormal    Phosphorus 2.3 (*)    AMMONIA - Abnormal    Ammonia 61 (*)    LIPASE - Abnormal    Lipase 120 (*)    CBC WITH PLATELETS AND DIFFERENTIAL - Abnormal    WBC Count 7.8      RBC Count 2.99 (*)     Hemoglobin 11.3 (*)     Hematocrit 31.0 (*)      (*)     MCH 37.8 (*)     MCHC 36.5      RDW 13.0      Platelet Count 67 (*)     % Neutrophils 71      % Lymphocytes 14      % Monocytes 12      % Eosinophils 2      % Basophils 1      % Immature Granulocytes 1      NRBCs per 100 WBC 0      Absolute Neutrophils 5.5      Absolute Lymphocytes 1.1      Absolute Monocytes 0.9      Absolute Eosinophils 0.1      Absolute Basophils 0.1      Absolute Immature Granulocytes 0.0      Absolute NRBCs 0.0     LACTIC ACID WHOLE BLOOD WITH 1X REPEAT IN 2 HR WHEN >2 - Normal    Lactic Acid, Initial 1.4     ETHANOL LEVEL BLOOD - Normal    Ethanol Level Blood <0.01     COMPREHENSIVE METABOLIC PANEL   ROUTINE UA WITH MICROSCOPIC        No orders to display       Treatments provided: see MAR  Family Comments: wife at bedside  OBS brochure/video discussed/provided to patient:  Yes  ED Medications:   Medications   melatonin tablet 5 mg (has no administration in time range)   cloNIDine (CATAPRES) tablet 0.1 mg (0.1 mg Oral $Given 7/20/25 9393)   diazepam (VALIUM) tablet 10 mg ( Oral See  Alternative 7/21/25 0027)     Or   diazepam (VALIUM) injection 5-10 mg (10 mg Intravenous $Given 7/21/25 0027)   magnesium sulfate 2 g in 50 mL sterile water intermittent infusion (2 g Intravenous $New Bag 7/21/25 0019)   sodium chloride 0.9% BOLUS 1,000 mL (0 mLs Intravenous Stopped 7/21/25 0027)   dextrose 5% in lactated ringers infusion (1,000 mLs Intravenous $New Bag 7/21/25 0027)   thiamine (B-1) tablet 100 mg (100 mg Oral $Given 7/20/25 2339)   folic acid (FOLVITE) tablet 1 mg (1 mg Oral $Given 7/20/25 2339)   multivitamin w/minerals (THERA-VIT-M) tablet 1 tablet (1 tablet Oral $Given 7/20/25 2339)   diazepam (VALIUM) injection 10 mg (10 mg Intravenous $Given 7/20/25 2348)   gabapentin (NEURONTIN) tablet 1,200 mg (600 mg Oral $Given 7/20/25 2339)   gabapentin (NEURONTIN) tablet 600 mg (600 mg Oral $Given 7/21/25 0019)       Drips infusing:  No  For the majority of the shift this patient was Yellow.   Interventions performed were reoriented due to pt is confusion at times.    Sepsis treatment initiated: No    Cares/treatment/interventions/medications to be completed following ED care: all admit orders    ED Nurse Name: Nette Hansen RN  12:36 AM

## 2025-07-21 NOTE — ED NOTES
Pt reports he has to urinate. Pt unsteady when standing. Explained to the pt he has to use the urinal at the bedside. Pt voided per urinal with nurse and wife at bedside.

## 2025-07-21 NOTE — PLAN OF CARE
"Pertinent assessments: Lethargic, arouses to voice. Disoriented to time & situation. Hypertensive, on 2L NC. Skin jaundiced with generalized rash. CIWA's negative, pt sleeping. Pivoted Ax1 from cart to bed.     Major Shift Events: Admit to MS5  Treatment Plan: PAULINOWA, GI consult, NPO   Bedside Nurse: LAUREN BONNER RN     Goal Outcome Evaluation:      Plan of Care Reviewed With: patient    Overall Patient Progress: no changeOverall Patient Progress: no change      Problem: Adult Inpatient Plan of Care  Goal: Plan of Care Review  Description: The Plan of Care Review/Shift note should be completed every shift.  The Outcome Evaluation is a brief statement about your assessment that the patient is improving, declining, or no change.  This information will be displayed automatically on your shift  note.  Outcome: Progressing  Flowsheets (Taken 7/21/2025 0602)  Plan of Care Reviewed With: patient  Overall Patient Progress: no change  Goal: Patient-Specific Goal (Individualized)  Description: You can add care plan individualizations to a care plan. Examples of Individualization might be:  \"Parent requests to be called daily at 9am for status\", \"I have a hard time hearing out of my right ear\", or \"Do not touch me to wake me up as it startles  me\".  Outcome: Progressing  Goal: Absence of Hospital-Acquired Illness or Injury  Outcome: Progressing  Intervention: Identify and Manage Fall Risk  Recent Flowsheet Documentation  Taken 7/21/2025 0415 by Lauren Bonner, RN  Safety Promotion/Fall Prevention:   activity supervised   assistive device/personal items within reach   clutter free environment maintained   increased rounding and observation   safety round/check completed   supervised activity  Intervention: Prevent Skin Injury  Recent Flowsheet Documentation  Taken 7/21/2025 0415 by Lauren Bonner, RN  Body Position: position changed independently  Intervention: Prevent and Manage VTE (Venous Thromboembolism) Risk  Recent " Flowsheet Documentation  Taken 7/21/2025 0415 by Lauren Bonner, RN  VTE Prevention/Management: SCDs off (sequential compression devices)  Intervention: Prevent Infection  Recent Flowsheet Documentation  Taken 7/21/2025 0415 by Lauren Bonner, RN  Infection Prevention:   cohorting utilized   hand hygiene promoted   rest/sleep promoted   single patient room provided  Goal: Optimal Comfort and Wellbeing  Outcome: Progressing  Goal: Readiness for Transition of Care  Outcome: Progressing     Problem: Fall Injury Risk  Goal: Absence of Fall and Fall-Related Injury  Outcome: Progressing  Intervention: Identify and Manage Contributors  Recent Flowsheet Documentation  Taken 7/21/2025 0415 by Lauren Bonner, RN  Medication Review/Management: medications reviewed  Intervention: Promote Injury-Free Environment  Recent Flowsheet Documentation  Taken 7/21/2025 0415 by Lauren Bonner, RN  Safety Promotion/Fall Prevention:   activity supervised   assistive device/personal items within reach   clutter free environment maintained   increased rounding and observation   safety round/check completed   supervised activity     Problem: Alcohol Withdrawal  Goal: Alcohol Withdrawal Symptom Control  Outcome: Progressing  Goal: Optimal Neurologic Function  Outcome: Progressing  Goal: Readiness for Change Identified  Outcome: Progressing

## 2025-07-21 NOTE — PLAN OF CARE
"Goal Outcome Evaluation:    End of Shift Summary  For vital signs and complete assessments, please see documentation flowsheets.     Pertinent assessments: Pt A&OX4. Vss and on RA. Ls clear and no sob noted. Tolerating diet and no nausea reported. Denied pain. Sleeping the first part of the shift and later was more awake and refusing GB for ambulation and was educated and agreed to use it . CIWA 0.K and Mg replaced and recheck WNL.    Major Shift Events None    Treatment Plan: Ciwa, IVF,  monitor labs,symptom management.  Bedside Nurse: Dolores Brown RN               Plan of Care Reviewed With: patient          Outcome Evaluation: Denied pain. Tolerating diet and no nausea noted.        Problem: Adult Inpatient Plan of Care  Goal: Plan of Care Review  Description: The Plan of Care Review/Shift note should be completed every shift.  The Outcome Evaluation is a brief statement about your assessment that the patient is improving, declining, or no change.  This information will be displayed automatically on your shift  note.  Outcome: Progressing  Flowsheets (Taken 7/21/2025 1751)  Outcome Evaluation: Denied pain. Tolerating diet and no nausea noted.  Plan of Care Reviewed With: patient  Goal: Patient-Specific Goal (Individualized)  Description: You can add care plan individualizations to a care plan. Examples of Individualization might be:  \"Parent requests to be called daily at 9am for status\", \"I have a hard time hearing out of my right ear\", or \"Do not touch me to wake me up as it startles  me\".  Outcome: Progressing  Goal: Absence of Hospital-Acquired Illness or Injury  Outcome: Progressing  Goal: Optimal Comfort and Wellbeing  Outcome: Progressing  Goal: Readiness for Transition of Care  Outcome: Progressing     Problem: Fall Injury Risk  Goal: Absence of Fall and Fall-Related Injury  Outcome: Progressing     Problem: Alcohol Withdrawal  Goal: Alcohol Withdrawal Symptom Control  Outcome: Progressing  Goal: " Optimal Neurologic Function  Outcome: Progressing  Goal: Readiness for Change Identified  Outcome: Progressing

## 2025-07-21 NOTE — ED NOTES
Pt SAO2 88% on room air. Pt resting with eyes closed. Pt awakens when spoken too. Pt placed on O2 at 2liters per NC

## 2025-07-21 NOTE — CONSULTS
"  CLINICAL NUTRITION SERVICES - ASSESSMENT NOTE    Registered Dietitian Interventions:  Adv to PO diet order once able    Once PO diet order, will schedule supplements BID as added source of nutrition    Continue micronutrients as ordered    Discussed need for adequate energy and protein intakes w/ wife at bedside. Discussed methods to increase overall intake and scheduling meals/snacks throughout the day with a goal of 4-6 intakes per day.       REASON FOR ASSESSMENT  Provider order - Alcohol Withdrawal    PMH: alcohol abuse, colon CA (received chemotherapy)    Per EMR, pt presented to ED on 7/20 with concerns of alcohol withdrawal symptoms. He was actually evaluated after syncopal episodes 2 days ago. At that visit his blood alcohol was 0.23 and he had multiple electrolyte abnormalities. Admission to the hospital was recommended but he declined.     INFORMATION OBTAINED  Patient's wife at bedside. Pt asleep and would not wake.    NUTRITION HISTORY  - Information obtained from chart review and pt's wife at bedside.  - Diet at home: Pt follows a regular diet at home and does not typically follow any specific plan or have any restrictions.  - Usual intakes: Jordan's wife tells me that pt does not really have a \"usual\" intake pattern, but that he is not much of an eater in general. She states that, at most, he will eat one meal per day but will often go days without eating anything other than maybe a small snack. She reports that he is a very picky eater and does not like most vegetables. She states that he is mostly a \"meat and potatoes\" type of mikhail. 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 She states that he has been this way for awhile, but that it has worsened over the last few weeks to the point where he has no appetite and is not interested in food at all.  - Barriers to PO intakes: Alcohol use, lack of appetite  - Use of oral " "supplements: None noted at baseline  - Chewing/swallowing issues: None noted  - Meal preparation/grocery shopping: Wife at home  - Allergies: NKFA    Per H&P note, \"He lost 40 pounds unintentionally over the last 6 months which may be secondary to excessive alcohol use, malnutrition, and cirrhosis.\"    CURRENT NUTRITION ORDERS  Diet: NPO      CURRENT INTAKE/TOLERANCE  Has been primarily NPO since admit. Had diet order for approx 1 hour in the very early morning of 7/21.    LABS  Nutrition-relevant labs: Reviewed  Noted: Na 132(L), K 3.0(L), Cr 0.65(L), Mag 1.4(L), (H), Bili 6.1(H),     MEDICATIONS  Nutrition-relevant medications: Reviewed  Noted: folic acid, lactulose, mag sulfate, MVI/M, klor-con, thiamine, IVF @ 75 ml/hr      ANTHROPOMETRICS  Height: 177.8 cm (5' 10\")  Most Recent Weight: 79 kg (174 lb 2.6 oz)  BMI (kg/m ): Body mass index is 24.99 kg/m . (Normal BMI)  Weight History:  Wt Readings from Last 10 Encounters:   07/20/25 79 kg (174 lb 2.6 oz)   07/18/25 79.1 kg (174 lb 6.1 oz)   07/21/24 81.6 kg (180 lb)     No significant weight loss upon chart review despite reported 40 lb weight loss?  I discussed with pt's wife that weight loss not reflected in chart. She stated that she has noticed his clothes fitting more loosely, but that she was going off of pt's report of weight loss - she was not sure where or when he weighed himself.      ASSESSED NUTRITION NEEDS  Dosing Weight: 79 kg, based on actual wt  Estimated Energy Needs: >/= 1992 kcals/day ([place] [address] Jeor w/ AF >/= 1.2)  Justification: Minimum Maintenance  Estimated Protein Needs: 94.8-118.5 grams protein/day (1.2 - 1.5 grams of pro/kg)  Justification: cirrhosis  Estimated Fluid Needs: 1 ml/kcal or per provider pending fluid status    SYSTEM AND PHYSICAL FINDINGS    Concern for malnutrition d/t alcohol intake.     Pt had CT of chest/abd/pelvis on 7/18 eval for near syncope episode - findings consistent w/ cirrhosis and portal " "hypertension along with cholelithiasis, distended thick-walled gallbladder, splenomegaly, and nonspecific left upper quadrant retroperitoneal stranding.     Disoriented to time and situation.     GI symptoms:   - Stools: None yet this admission  - Emesis: None this admission    Skin/wounds:   - Edema: None currently documented  - Pressure Injuries/Nonhealing wounds: None currently documented    MALNUTRITION  % Intake: </=50% for >/= 1 month (severe)  % Weight Loss: None noted  Subcutaneous Fat Loss: Unable to assess - pt asleep. Did not wake.   Muscle Loss:  Unable to assess - pt asleep. Did not wake. Did notice wasting occurring in temples.  Fluid Accumulation/Edema: None noted  Malnutrition Diagnosis: Unable to determine due to lack of NFPE. Suspect malnutrition.  Malnutrition Present on Admission: Unable to assess    NUTRITION DIAGNOSIS  Inadequate oral intake (energy/protein) related to food displacement with alcohol and increased needs as evidenced by reported intakes PTA and recent new cirrhosis dx.     INTERVENTIONS  See nutrition interventions above    Goals  Transition to PO diet order within 2-3 days    PO intake of >/= 50% of nutritionally adequate meals or supplements TID to start to show improved trending of intakes.      Monitoring/Evaluation  Progress toward goals will be monitored and evaluated per policy.        Annetta Hyman RD, LD  Clinical Dietitian  [place] Message Group: \"Dietitian [Ramírez]\"  Office Phone: [phone]      "

## 2025-07-21 NOTE — ED PROVIDER NOTES
"  History     Chief Complaint:  Withdrawal    History limited due to alcohol withdraw syndrome.    HPI   Carlos A Lopez is a 49 year old male who presents with his wife due to concerns of alcohol withdrawal symptoms.  He was actually evaluated after syncopal episodes 2 days ago.  At that visit his blood alcohol was 0.23 and he had multiple electrolyte abnormalities.  Admission to the hospital was recommended but he declined.  He has not had any drink since that time.  His wife describes shakes and tremors that started on Saturday.  This morning he started having hallucinations and continues to have them.  He describes \"multiple nonspeaking women that were in the car\" with him on the ride here.  He also describes \"a group of 35 women who were at his house swinging from [id number] and then cleaning his house.\"  He is convinced that these were real events.  He very much minimizes his alcohol intake stating he only has 2 drinks occasionally.  No new falls or trauma.  No fevers.  No vomiting.  He denies chest pain or shortness of breath.  His wife states that the yellow in his eyes was first noticed several weeks ago.  He is also lost 40 pounds in the last year to year and a half.  Imaging from 7/18 reviewed and noted below.  Wife states she has been giving Valium at home which was prescribed at discharge from the ER.  She initially felt this was helping but is concerned for his safety at this point.    Independent Historian:    Patient's wife provides history above    Review of External Notes:  Care everywhere reviewed and epic updated.    ER visit from 7/18 reviewed.    Imaging from 7/18:    Imaging   US Abdomen Limited   Final Result   IMPRESSION:   1.  Cholelithiasis and borderline gallbladder wall thickening. No other sonographic evidence of acute cholecystitis.   2.  Hepatic steatosis and mild surface nodularity of the liver, suggesting chronic parenchymal liver disease, including hepatic steatosis with or " without fibrosis. No focal hepatic masses visualized.           CT Chest/Abdomen/Pelvis w Contrast   Final Result   IMPRESSION:   1.  Morphologic changes of cirrhosis and portal hypertension.   2.  Distended thick-walled gallbladder with dependent stones. Correlate clinically to exclude cholecystitis and consider ultrasound if warranted.   3.  New right upper lobe groundglass nodule. Adenocarcinoma not excluded. If more recent comparison images are unavailable, six-month follow-up recommended per Fleischner Society guidelines.   4.  Nonspecific left upper quadrant retroperitoneal stranding. Attention at follow-up advised.     Medications:    diazepam (VALIUM) 5 MG tablet  magnesium gluconate (MAGONATE) 500 (27 Mg) MG tablet  ondansetron (ZOFRAN ODT) 4 MG ODT tab  potassium chloride ananya ER (KLOR-CON M20) 20 MEQ CR tablet      Past Medical History:    Past Medical History:   Diagnosis Date    Alcohol abuse     Alcoholic liver disease     Asthma     Cholelithiasis     Colon cancer     Colonic adenoma     Hypertension     Pulmonary nodule - right upper lobe      Past Surgical History:    Past Surgical History:   Procedure Laterality Date    FINGER SURGERY Left     Third finger    IR CHEST PORT PLACEMENT > 5 YRS OF AGE  06/05/2015    Right hemicolectomy NOS      TONSILLECTOMY, ADENOIDECTOMY, COMBINED        Physical Exam   Patient Vitals for the past 24 hrs:   BP Temp Temp src Pulse Resp SpO2 Height Weight   07/21/25 0104 -- -- -- 93 -- 95 % -- --   07/21/25 0103 -- -- -- 90 -- 96 % -- --   07/21/25 0102 -- -- -- 89 -- 96 % -- --   07/21/25 0101 -- -- -- 90 -- -- -- --   07/21/25 0100 147/92 -- -- 87 -- 95 % -- --   07/21/25 0051 -- -- -- 89 -- 97 % -- --   07/21/25 0050 -- -- -- 93 -- 96 % -- --   07/21/25 0049 -- -- -- 95 -- 95 % -- --   07/21/25 0048 -- -- -- 96 -- 95 % -- --   07/21/25 0047 -- -- -- 96 -- 95 % -- --   07/21/25 0046 -- -- -- 93 -- 96 % -- --   07/21/25 0045 141/87 -- -- 98 -- 94 % -- --   07/21/25  "0033 -- -- -- 92 -- 94 % -- --   07/21/25 0032 -- -- -- 91 -- 94 % -- --   07/21/25 0031 -- -- -- 89 -- 93 % -- --   07/21/25 0030 150/90 -- -- 90 -- 95 % -- --   07/21/25 0022 -- -- -- 91 -- 97 % -- --   07/21/25 0021 -- -- -- 98 -- 97 % -- --   07/21/25 0020 -- -- -- 92 -- 99 % -- --   07/21/25 0019 -- -- -- 93 -- 97 % -- --   07/21/25 0018 -- -- -- 97 -- -- -- --   07/21/25 0017 -- -- -- 101 -- 97 % -- --   07/21/25 0016 164/105 -- -- 100 -- -- -- --   07/21/25 0003 -- -- -- 91 -- 96 % -- --   07/21/25 0002 -- -- -- 93 -- 96 % -- --   07/21/25 0001 -- -- -- 93 -- 96 % -- --   07/21/25 0000 147/87 -- -- 93 -- 94 % -- --   07/20/25 2339 162/97 -- -- -- -- -- -- --   07/20/25 2304 168/92 99.8  F (37.7  C) Temporal 103 18 99 % 1.778 m (5' 10\") 79 kg (174 lb 2.6 oz)      Physical Exam  Nursing note and vitals reviewed.  Constitutional: Sitting up comfortably  HENT:   Mouth/Throat: Mucous membranes are dry  Eyes: Scleral icterus noted  Cardiovascular: Tachycardic rate, regular rhythm and normal heart sounds.  No murmur.  Pulmonary/Chest: Effort normal and breath sounds normal. No respiratory distress. No wheezes. No rales.   Abdominal: Soft. Normal appearance. There is no tenderness. There is no rigidity and no guarding.   Musculoskeletal: Normal range of motion.   Neurological: Alert.  Oriented to self and time but not situation.  Strength normal  Skin: Skin is warm and dry.  Mild jaundice  Psychiatric: Patient having active hallucinations as described in the HPI.  Poor insight.    Emergency Department Course     Imaging:  No orders to display     Laboratory:  Labs Ordered and Resulted from Time of ED Arrival to Time of ED Departure   INR - Abnormal       Result Value    INR 2.00 (*)     PT 22.5 (*)    COMPREHENSIVE METABOLIC PANEL - Abnormal    Sodium 126 (*)     Potassium 3.2 (*)     Carbon Dioxide (CO2) 28      Anion Gap 12      Urea Nitrogen 8.5      Creatinine 0.68      GFR Estimate >90      Calcium 10.4      " Chloride 86 (*)     Glucose 108 (*)     Alkaline Phosphatase 149       (*)     ALT 28      Protein Total 8.8 (*)     Albumin 3.8      Bilirubin Total 7.1 (*)    MAGNESIUM - Abnormal    Magnesium 1.0 (*)    PHOSPHORUS - Abnormal    Phosphorus 2.3 (*)    AMMONIA - Abnormal    Ammonia 61 (*)    LIPASE - Abnormal    Lipase 120 (*)    ROUTINE UA WITH MICROSCOPIC - Abnormal    Color Urine Yellow      Appearance Urine Clear      Glucose Urine Negative      Bilirubin Urine Negative      Ketones Urine Negative      Specific Gravity Urine 1.004      Blood Urine Negative      pH Urine 7.5 (*)     Protein Albumin Urine Negative      Urobilinogen Urine Normal      Nitrite Urine Negative      Leukocyte Esterase Urine Negative      RBC Urine <1      WBC Urine 0     CBC WITH PLATELETS AND DIFFERENTIAL - Abnormal    WBC Count 7.8      RBC Count 2.99 (*)     Hemoglobin 11.3 (*)     Hematocrit 31.0 (*)      (*)     MCH 37.8 (*)     MCHC 36.5      RDW 13.0      Platelet Count 67 (*)     % Neutrophils 71      % Lymphocytes 14      % Monocytes 12      % Eosinophils 2      % Basophils 1      % Immature Granulocytes 1      NRBCs per 100 WBC 0      Absolute Neutrophils 5.5      Absolute Lymphocytes 1.1      Absolute Monocytes 0.9      Absolute Eosinophils 0.1      Absolute Basophils 0.1      Absolute Immature Granulocytes 0.0      Absolute NRBCs 0.0     LACTIC ACID WHOLE BLOOD WITH 1X REPEAT IN 2 HR WHEN >2 - Normal    Lactic Acid, Initial 1.4     ETHANOL LEVEL BLOOD - Normal    Ethanol Level Blood <0.01       Interventions:  Medications   melatonin tablet 5 mg (has no administration in time range)   cloNIDine (CATAPRES) tablet 0.1 mg (0.1 mg Oral $Given 7/20/25 2339)   diazepam (VALIUM) tablet 10 mg ( Oral See Alternative 7/21/25 0027)     Or   diazepam (VALIUM) injection 5-10 mg (10 mg Intravenous $Given 7/21/25 0027)   potassium chloride 10 mEq in 100 mL sterile water infusion (10 mEq Intravenous $New Bag 7/21/25 0101)    sodium chloride 0.9% BOLUS 1,000 mL (0 mLs Intravenous Stopped 25 002)   dextrose 5% in lactated ringers infusion (1,000 mLs Intravenous $New Bag 25 002)   thiamine (B-1) tablet 100 mg (100 mg Oral $Given 25)   folic acid (FOLVITE) tablet 1 mg (1 mg Oral $Given 25)   multivitamin w/minerals (THERA-VIT-M) tablet 1 tablet (1 tablet Oral $Given 25)   diazepam (VALIUM) injection 10 mg (10 mg Intravenous $Given 258)   gabapentin (NEURONTIN) tablet 1,200 mg (600 mg Oral $Given 25)   gabapentin (NEURONTIN) tablet 600 mg (600 mg Oral $Given 25 0019)   magnesium sulfate 2 g in 50 mL sterile water intermittent infusion (0 g Intravenous Stopped 25 010)     Assessments:  2330 patient evaluated in ER room 8.  History obtained from patient and his wife.  Initial exam performed and workup ordered    Independent Interpretation (X-rays, CTs, rhythm strip):  None    Consultations/Discussion of Management or Tests:  Hospitalist, Dr. Jordon Schaefer     Social Drivers of Health affecting care:  Ongoing alcohol abuse significantly impacting health care     Disposition:  The patient was admitted to the hospital under the care of Dr. Schaefer.    Impression & Plan       Medical Decision Makin-year-old gentleman who presents with clear alcohol withdrawal syndrome and perceptual disturbance including hallucinations as described in the HPI.  Outpatient management with oral Valium is not been effective and his wife fears for his safety given his poor insight.  He very much minimizes his alcohol intake.  He has clear radiographic and metabolic evidence of alcoholic liver disease.  There is multiple electrolyte derangements that will need to be addressed.  We have started repletion of these as well as IV fluid administration in the emergency department.  At this time he will be admitted on [facility] protocol to the hospitalist service.    Diagnosis:    ICD-10-CM    1. Alcohol  withdrawal with perceptual disturbance (H)  F10.932     R56.9       2. Hypomagnesemia  E83.42       3. Hypophosphatemia  E83.39       4. Thrombocytopenia  D69.6       5. Weight loss  R63.4       6. Alcoholic cirrhosis of liver   K70.30       7. Hypokalemia  E87.6       8. Hyponatremia  E87.1       9. Hyperbilirubinemia  E80.6       10. Prolonged Q-T interval on ECG  R94.31                 Nader Mcguire MD  07/21/25 0120       Nader Mcguire MD  07/21/25 0307

## 2025-07-21 NOTE — PHARMACY-ADMISSION MEDICATION HISTORY
Pharmacist Admission Medication History    Admission medication history is complete. The information provided in this note is only as accurate as the sources available at the time of the update.    Information Source(s): Patient and CareEverywhere/SureScripts via in-person    Pertinent Information: Patient reports he does not take any medications at home. Marked all meds as not taking rather than deleting as these were prescribed recently on 7/18/25 and likely should be taking potassium and magnesium for low K & Mag levels.     Changes made to PTA medication list:  Added: None  Deleted: None  Changed: Valium, magnesium, ondansetron, potassium from taking --> not taking    Allergies reviewed with patient and updates made in EHR: no    Medication History Completed By: NATALYA العلي [facility] 7/21/2025 8:04 AM    Prior to Admission medications    Medication Sig Last Dose Taking? Auth Provider Long Term End Date   diazepam (VALIUM) 5 MG tablet Take 1 tablet (5 mg) by mouth every 6 hours as needed for anxiety or withdrawal.  Patient not taking: Reported on 7/21/2025 Not Taking  Jaquan Garcia MD     magnesium gluconate (MAGONATE) 500 (27 Mg) MG tablet Take 1 tablet (500 mg) by mouth daily.  Patient not taking: Reported on 7/21/2025 Not Taking  Jaquan Garcia MD     ondansetron (ZOFRAN ODT) 4 MG ODT tab Take 1 tablet (4 mg) by mouth every 8 hours as needed for nausea.  Patient not taking: Reported on 7/21/2025 Not Taking  Jaquan Garcia MD  7/21/25   potassium chloride ananya ER (KLOR-CON M20) 20 MEQ CR tablet Take 1 tablet (20 mEq) by mouth 2 times daily.  Patient not taking: Reported on 7/21/2025 Not Taking  Jaquan Garcia MD

## 2025-07-21 NOTE — ED NOTES
[facility]    ED Boarding Nurse Handoff Addendum Report:    Date/time: 7/21/2025, 3:37 AM    Activity Level: in bed, unsteady    Fall Risk: Yes:  bed/chair alarm on, nonskid shoes/slippers when out of bed, assistive device/personal items within reach, and room door open    Active Infusions: none    Current Meds Due: See MAR    Current care needs: patient on hold - increased supervision, CIWA, on tele    Oxygen requirements (liters/min and/or FiO2): 2L to sleep    Respiratory status: Nasal cannula    Vital signs (within last 30 minutes):    Vitals:    07/21/25 0240 07/21/25 0241 07/21/25 0300 07/21/25 0318   BP:   134/85 (!) 151/93   BP Location:    Left arm   Pulse: 87 85 88    Resp:    16   Temp:    97.5  F (36.4  C)   TempSrc:    Axillary   SpO2: 100% 99% 98% 99%   Weight:       Height:           ED Boarding Nurse name: Erika Nino RN

## 2025-07-21 NOTE — PROGRESS NOTES
HOSPITALIST FOLLOW UP NOTE:    The patient was seen and examined, I agree with the history, exam, assessment and plan of Dr Schaefer.    Pt seen with spouse. Somewhat improved. Less confused. Clearly disheveled with longstanding unmanaged alcohol use disorder. LFTs higher than a couple days ago. Likely combo of etoh hepatitis and cirrhosis. GI consulted, check MELD and DF to determine if prednisolone should be started. Thiamine, FA, MVI, CIWA, gabapentin, PRN BNZ. Not tremulous but certainly has some degree of etoh WD. Last drink estimated to be Fri. IVF, serial Hb, IV PPI, possible EGD, diet per GI, electrolyte replacement. Discuss treatment, AA, etc when more appropriate, but pt already voicing his lack of interest in this.    Nader Salinas DO MPH  [facility] Hospitalist  [address] [place] [address]  Pager: [phone]  07/21/2025

## 2025-07-21 NOTE — CONSULTS
GI CONSULT NOTE      Name: Carlos A Lopez  Medical Record #: [id number]  YOB: 1975  Date of Admission: 7/20/2025  Date/Time: 7/21/2025/8:45 AM     CHIEF COMPLAINT: hallucinations and tremor     HISTORY OF PRESENT ILLNESS: We were asked to see Carlos A Lopez by Dr. Schaefer for evaluation of alcohol hepatitis with likely new cirrhosis and melena.     Carlos A Lopez is a 49 year old year old male with history of colon cancer s/p right hemicolectomy and chemotherapy, HTN, cholelithiasis, and likely alcohol dependence who presents with visual hallucinations.     He was in the ER 2 days ago for near syncope episodes and found to have bilirubin of 5 and CT chest abdomen pelvis with findings consistent with cirrhosis and portal hypertension along with cholelithiasis an distended thick walled gallbladder. Abdominal US showed cholelithiasis with borderline gallbladder wall thickening but no cholecystitis. Admission was recommended but patient declined.     Today patient developed visual hallucinations and his wife brought him to the ED due to concern of alcohol withdrawal. Wife also notes scleral icterus about a week ago. Patient has no prior history of liver disease. Patient has lost 40 lbs unintentionally in the last year and a half.     In the ED, EtOH level undetectable. WBC 7.8, hemoglobin 11.3, platelet count 67. Bilirubin 7.1, , ammonia level 61. Lipase 120. Lactic acid 1.4. INR 2.0.     REVIEW OF SYSTEMS (ROS): Complete review of systems negative other than listed in HPI.    PAST MEDICAL HISTORY:  Past Medical History:   Diagnosis Date    Alcohol abuse     Alcoholic liver disease     Asthma     Cholelithiasis     Colon cancer     Colonic adenoma     Hypertension     Pulmonary nodule - right upper lobe     Noted during ER workup, 7/2025        FAMILY HISTORY:  No family history on file.    SOCIAL HISTORY:  Social History     Socioeconomic History    Marital status:      Spouse name:  "Not on file    Number of children: Not on file    Years of education: Not on file    Highest education level: Not on file   Occupational History    Not on file   Tobacco Use    Smoking status: Not on file    Smokeless tobacco: Not on file   Substance and Sexual Activity    Alcohol use: Not on file    Drug use: Not on file    Sexual activity: Not on file   Other Topics Concern    Not on file   Social History Narrative    Not on file     Social Drivers of Health     Financial Resource Strain: Not on file   Food Insecurity: Not on file   Transportation Needs: Not on file   Physical Activity: Not on file   Stress: Not on file   Social Connections: Not on file   Interpersonal Safety: Not on file   Housing Stability: Not on file       MEDICATIONS PRIOR TO ADMISSION:   Medications Prior to Admission   Medication Sig Dispense Refill Last Dose/Taking    diazepam (VALIUM) 5 MG tablet Take 1 tablet (5 mg) by mouth every 6 hours as needed for anxiety or withdrawal. (Patient not taking: Reported on 7/21/2025) 20 tablet 0 Not Taking    magnesium gluconate (MAGONATE) 500 (27 Mg) MG tablet Take 1 tablet (500 mg) by mouth daily. (Patient not taking: Reported on 7/21/2025) 5 tablet 0 Not Taking    ondansetron (ZOFRAN ODT) 4 MG ODT tab Take 1 tablet (4 mg) by mouth every 8 hours as needed for nausea. (Patient not taking: Reported on 7/21/2025) 10 tablet 0 Not Taking    potassium chloride ananya ER (KLOR-CON M20) 20 MEQ CR tablet Take 1 tablet (20 mEq) by mouth 2 times daily. (Patient not taking: Reported on 7/21/2025) 10 tablet 0 Not Taking          ALLERGIES: Patient has no known allergies.    PHYSICAL EXAM:    /77 (BP Location: Left arm)   Pulse 87   Temp 98.5  F (36.9  C) (Oral)   Resp 20   Ht 1.778 m (5' 10\")   Wt 79 kg (174 lb 2.6 oz)   SpO2 96%   BMI 24.99 kg/m      GENERAL: No obvious distress  EYES: No scleral icterus  ABDOMEN: Mildly distended. Soft. Non tender.   SKIN: No jaundice  NEUROLOGIC: Sleepy  PSYCHIATRIC: " Normal affect    LAB DATA:  CMP Results:   Recent Labs   Lab Test 07/21/25  0706 07/20/25 2330 07/18/25 1953   * 126* 137   POTASSIUM 3.0* 3.2* 2.8*   CHLORIDE 95* 86* 90*   CO2 27 28 28   ANIONGAP 10 12 19*   * 108* 96   BUN 7.1 8.5 8.6   CR 0.65* 0.68 0.82   BILITOTAL 6.1* 7.1* 5.1*   ALKPHOS 126 149 161*   ALT 24 28 27   * 176* 200*      CBC  Recent Labs   Lab 07/21/25  0706 07/20/25 2330 07/18/25 1953   WBC 7.2 7.8 7.9   RBC 2.66* 2.99* 3.02*   HGB 9.9* 11.3* 11.3*   HCT 28.1* 31.0* 31.4*   * 104* 104*   MCH 37.2* 37.8* 37.4*   MCHC 35.2 36.5 36.0   RDW 13.2 13.0 13.5   PLT 66* 67* 77*     INR  Recent Labs   Lab 07/20/25 2330 07/18/25 1953   INR 2.00* 1.51*      Lipase   Date Value Ref Range Status   07/20/2025 120 (H) 13 - 60 U/L Final   07/18/2025 80 (H) 13 - 60 U/L Final     MELD 3.0: 23 at 7/21/2025  7:06 AM  MELD-Na: 24 at 7/21/2025  7:06 AM  Calculated from:  Serum Creatinine: 0.65 mg/dL (Using min of 1 mg/dL) at 7/21/2025  7:06 AM  Serum Sodium: 132 mmol/L at 7/21/2025  7:06 AM  Total Bilirubin: 6.1 mg/dL at 7/21/2025  7:06 AM  Serum Albumin: 3.4 g/dL at 7/21/2025  7:06 AM  INR(ratio): 2 at 7/20/2025 11:30 PM  Age at listing (hypothetical): 49 years  Sex: Male at 7/21/2025  7:06 AM    MDF = 41.5    IMAGING:  US ABDOMEN LIMITED 7/18/2025  IMPRESSION:  1.  Cholelithiasis and borderline gallbladder wall thickening. No other sonographic evidence of acute cholecystitis.  2.  Hepatic steatosis and mild surface nodularity of the liver, suggesting chronic parenchymal liver disease, including hepatic steatosis with or without fibrosis. No focal hepatic masses visualized.    CT CHEST ABDOMEN PELVIS W CONTRAST 7/18/2025  IMPRESSION:  1.  Morphologic changes of cirrhosis and portal hypertension.  2.  Distended thick-walled gallbladder with dependent stones. Correlate clinically to exclude cholecystitis and consider ultrasound if warranted.  3.  New right upper lobe groundglass nodule.  Adenocarcinoma not excluded. If more recent comparison images are unavailable, six-month follow-up recommended per Fleischner Society guidelines.  4.  Nonspecific left upper quadrant retroperitoneal stranding. Attention at follow-up advised.    ASSESSMENT:    Acute alcohol hepatitis. Patient has MELD score of 24 and MDF of 41.5. Will monitor and consider glucocorticoids in next few days if labs remain elevated.   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 CT with morphologic changes of cirrhosis and portal hypertension. Suspect alcohol as cause but will check viral hepatitis serologies.    Confusion. Possible hepatic encephalopathy vs alcohol withdrawal. Ammonia mildly elevated at 61 on admission. Patient received one dose of lactulose.   Anemia. Hgb 11.3 on arrival and 9.9 this morning. Patient at risk for alcohol gastritis or portal hypertensive gastropathy in setting of cirrhosis. Will consider EGD pending clinical course.     PLAN:  - Alcohol withdrawal protocol per primary   - Thiamine and folic acid  - Monitor MELD labs daily  - Monitor hgb, transfuse prn   - IV PPI BID  - Fluid and electrolyte management per primary   - Alcohol cessation  - Viral hepatitis screening pending   - Outpatient follow-up with Hepatology. [facility] will contact patient to arrange.     Discussed patient findings and plan with Dr. Woodward.     TIME SPENT: 45 min including chart review, patient interview and care coordination.                                                Helen Arshad PA-C  Thank you for the opportunity to participate in the care of this patient.   Please feel free to call me with any questions or concerns.  Phone number [phone].

## 2025-07-21 NOTE — H&P
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  Abdominal ultrasound showed cholelithiasis with borderline gallbladder wall thickening, but not any abdominal pain or other features for acute cholecystitis.  Spouse noted scleral icterus a week ago.  He was reporting only using alcohol 3-4 times per week and only 1 or 2 cocktails at a time.  Etoh level then was 0.23.  He declined admission.  He was given diazepam and was taking it as needed for the last 2 days as he was developing significant tremors with not drinking alcohol.  Today he developed visual hallucinations that he does not realize are not real.  He described seeing multiple women performing acrobatics including a trapeze in his home before coming in and then a man came and sat down by him which he thought was weird.  Hypertensive, tachycardic, tremulous consistent with acute alcohol withdrawal syndrome.  His ammonia level is elevated at 61, but all of his symptoms may be from withdrawal and not necessarily hepatic encephalopathy.  I suspect he has alcohol hepatitis with some underlying cirrhosis.  He lost 40 pounds unintentionally over the last 6 months which may be  secondary to excessive alcohol use, malnutrition, and cirrhosis.  He does have a history of colon cancer.  0.3, platelet count 67 2.0 up from 1.5 a few days ago, bilirubin 7.1, , ALT 28, alk phos 149, albumin 3.8.  Lipase 120, but nontender abdominal exam and denies pain.  CT 2 days ago had some retroperitoneal stranding in the left upper quadrant so he may have mild pancreatitis.  -Consult [facility] for possible alcohol hepatitis treatment and for melena, keep n.p.o. until seen  -High-dose gabapentin protocol for alcohol withdrawal  -CIWA with diazepam as needed  -Thiamine, folic acid, multivitamin  -Clonidine 0.1 mg every 8 hours  -QTc quite prolonged with his electrolyte abnormalities so no Haldol or olanzapine ordered  -1 dose oral vitamin K 5mg  -1 dose 20 g lactulose at 8 AM  -Placed on health officer hold in the ER  -Consult SW when withdrawal improving  -CBC, CMP, magnesium, phosphorus, ammonia levels in the morning.  INR tomorrow morning to see if vitamin K effective    Hyponatremia  Hypokalemia  Hypomagnesemia  Hypophosphatemia: Sodium low at 126, potassium 3.2, magnesium 1.0, phosphorus 2.3.  This is secondary to likely poor p.o. intake and malnutrition from alcohol dependence.  Appears hypovolemic on exam.  Received some K and Mg replacement in the ER.  Received 1 L NS and 1 L D5LR in the ER.  -NS with 20 meq K at 75 mL/hr  -Potassium, magnesium, phosphorus replacement protocols  -Check CMP, magnesium, phosphorus levels in the morning    Anemia  Thrombocytopenia  Possible acute upper GI bleed: Hemoglobin 11.3 and platelet count of 67.  Spouse thinks he has had some darker black stools although patient denies.  Counts similar to 2 days ago.  Likely has some alcohol gastritis or portal hypertensive gastropathy with oozing in the setting of thrombocytopenia and coagulopathy.  - IV pantoprazole 40 mg twice daily  - Consider GI consult  - CBC in the morning    Prolonged QTc: EKG rechecked here with  QTc still prolonged at 520.  Likely secondary to hypokalemia and hypomagnesemia.  -Replace electrolytes and ordered repeat EKG for the morning  -Avoid QTc prolonging medications as able  - Cardiac monitoring    History of hypertension: Not currently taking any medications for this.  Blood pressure elevated in the ER in the setting of alcohol withdrawal.  -Clonidine 0.1 mg every 8 hours for withdrawal    History colon cancer  Neuropathy: Right hemicolectomy in 2015.  Received chemotherapy then.  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  - Outpatient follow-up with his oncologist given the 40 pound weight loss    Pulmonary nodule: New right upper lobe groundglass nodule.  - 6-month follow-up chest CT    DVT Prophylaxis: Pneumatic Compression Devices  Code Status: Full Code  FEN: N.p.o. until seen by JEEVAN CLINTON with 20 meq K at 75 mL/hr  Discharge Dispo: Consult SW for alcohol cessation resources  Estimated Disch Date / # of Days until Disch: Admit inpatient for alcohol withdrawal syndrome and likely alcohol hepatitis.  Anticipate 3-5 night hospitalization.      History of Present Illness:  Carlos A Lopez is a 49 year old male with PMH including colon cancer s/p right hemicolectomy and chemotherapy, HTN, cholelithiasis, and likely alcohol dependence who presents with his spouse for evaluation of visual hallucinations.  He was here in the ER 2 days ago on 7/18 for near syncope episodes and was found to have bilirubin of 5 and CT chest/abdomen/pelvis consistent with cirrhosis and portal hypertension along with cholelithiasis and distended thick-walled gallbladder.  Abdominal ultrasound showed cholelithiasis with borderline gallbladder wall thickening, but not any abdominal pain or other features for acute cholecystitis.  Spouse noted scleral icterus a week ago.  He was reporting only using alcohol 3-4 times per week and only 1 or 2 cocktails at a time.  Etoh level then was  0.23.  He declined admission.  He was given diazepam and was taking it as needed for the last 2 days as he was developing significant tremors with not drinking alcohol.  Today he developed visual hallucinations that he does not realize are not real.  He described seeing multiple women performing acrobatics including a trapeze in his home before coming in and then a man came and sat down by him which he thought was weird.  He lost 40 pounds unintentionally over the last 6 months.    History obtained from patient, patient's spouse secondary to his confusion, medical record, and from Dr. Mcguire in the emergency department.  Blood pressure 162/97, heart rate 103, temperature 99.8  F, oxygen 99% on room air.  Ethanol level undetectable.  WBC 7.8, hemoglobin 11.3, platelet count 67.  Bilirubin 7.1 and AST is 176.  Ammonia level elevated at 61.  Sodium low at 126 as is chloride 86, magnesium 1.0, phosphorus 2.3, potassium 3.2.  Glucose 108.  Lipase 120.  Lactic acid 1.4.  INR 2.0.  UA unremarkable.  He received 1 L NS, 1 L D5 LR, thiamine, folic acid, multivitamin, 2 g IV magnesium, 10 meq IV potassium, 10 mg IV diazepam x 2, clonidine 0.1 mg, gabapentin 1200 mg and then 600 mg.  He is in active alcohol withdrawal in the ER.  Admit inpatient.       Clinically Significant Risk Factors Present on Admission        # Hypokalemia: Lowest K = 3.2 mmol/L in last 2 days, will replace as needed  # Hyponatremia: Lowest Na = 126 mmol/L in last 2 days, will monitor as appropriate  # Hypochloremia: Lowest Cl = 86 mmol/L in last 2 days, will monitor as appropriate    # Hypomagnesemia: Lowest Mg = 1 mg/dL in last 2 days, will replace as needed    # Coagulation Defect: INR = 2.00 (Ref range: 0.85 - 1.15) and/or PTT = N/A, will monitor for bleeding  # Thrombocytopenia: Lowest platelets = 67 in last 2 days, will monitor for bleeding                 # Financial/Environmental Concerns:                     Past Medical History reviewed:  Past  "Medical History:   Diagnosis Date    Alcohol abuse     Alcoholic liver disease     Asthma     Cholelithiasis     Colon cancer     Colonic adenoma     Hypertension     Pulmonary nodule - right upper lobe     Noted during ER workup, 7/2025     Past Surgical History reviewed:  Past Surgical History:   Procedure Laterality Date    FINGER SURGERY Left     Third finger    IR CHEST PORT PLACEMENT > 5 YRS OF AGE  06/05/2015    Right hemicolectomy NOS      TONSILLECTOMY, ADENOIDECTOMY, COMBINED       Social History reviewed:  Social History     Tobacco Use    Smoking status: Not on file    Smokeless tobacco: Not on file   Substance Use Topics    Alcohol use: Not on file     Social History     Social History Narrative    Not on file     Family History reviewed:  No family history on file.  Allergies:  No Known Allergies  Medications:  Prior to Admission medications    Medication Sig Last Dose Taking? Auth Provider Long Term End Date   diazepam (VALIUM) 5 MG tablet Take 1 tablet (5 mg) by mouth every 6 hours as needed for anxiety or withdrawal.   Jaquan Garcia MD     magnesium gluconate (MAGONATE) 500 (27 Mg) MG tablet Take 1 tablet (500 mg) by mouth daily.   Jaquan Garcia MD     ondansetron (ZOFRAN ODT) 4 MG ODT tab Take 1 tablet (4 mg) by mouth every 8 hours as needed for nausea.   Jaquan Garcia MD  7/21/25   potassium chloride ananya ER (KLOR-CON M20) 20 MEQ CR tablet Take 1 tablet (20 mEq) by mouth 2 times daily.   Jaquan Garcia MD       Review of Systems:  A Comprehensive greater than 10 system review of systems was carried out.  Pertinent positives and negatives are noted above.  Otherwise negative.     Physical Exam:  Blood pressure (!) 147/92, pulse 93, temperature 99.8  F (37.7  C), temperature source Temporal, resp. rate 18, height 1.778 m (5' 10\"), weight 79 kg (174 lb 2.6 oz), SpO2 95%.  Wt Readings from Last 1 Encounters:   07/20/25 79 kg (174 lb 2.6 oz) "     Exam:  Constitutional: Awake, appears acutely ill  Eyes: Scleral icterus  HEENT: Dry mucous membranes  Respiratory: no respiratory distress, lungs cta bilaterally, no crackles or wheeze  Cardiovascular: Tachycardic, no murmur  GI: Soft, mildly distended, nontender in the right upper quadrant, bowel sounds present  Skin: Scleral icterus present.  Telangiectasias on face.  Musculoskeletal/extremities: atraumatic, no major deformities. No edema  Neurologic: Appears slightly sedated from medications.  Not oriented to place or situation.  Believes previous visual hallucinations are real.  Slightly tremulous.  Psychiatric: calm currently    Lab and imaging data personally reviewed:  Labs:  Recent Labs   Lab 07/20/25 2330 07/18/25 1953   WBC 7.8 7.9   HGB 11.3* 11.3*   HCT 31.0* 31.4*   * 104*   PLT 67* 77*     Recent Labs   Lab 07/20/25 2330 07/18/25 1953   * 137   POTASSIUM 3.2* 2.8*   CHLORIDE 86* 90*   CO2 28 28   ANIONGAP 12 19*   * 96   BUN 8.5 8.6   CR 0.68 0.82   GFRESTIMATED >90 >90   RACIEL 10.4 10.3   MAG 1.0* 1.1*   PHOS 2.3*  --    PROTTOTAL 8.8* 9.1*   ALBUMIN 3.8 3.9   BILITOTAL 7.1* 5.1*   ALKPHOS 149 161*   * 200*   ALT 28 27     Recent Labs   Lab 07/20/25 2330 07/18/25 1953   INR 2.00* 1.51*     Recent Labs   Lab 07/20/25 2330 07/18/25 1953   LACT 1.4 2.2*     Recent Labs   Lab 07/20/25 2330 07/18/25 1953   LIPASE 120* 80*     Recent Labs   Lab 07/21/25  0023   COLOR Yellow   APPEARANCE Clear   URINEGLC Negative   URINEBILI Negative   URINEKETONE Negative   SG 1.004   UBLD Negative   URINEPH 7.5*   PROTEIN Negative   NITRITE Negative   LEUKEST Negative   RBCU <1   WBCU 0       EKG: Sinus tachycardia, QTc 520    Imaging from 7/18/2025:  EXAM: CT CHEST/ABDOMEN/PELVIS W CONTRAST  LOCATION: [facility]  DATE: 7/18/2025     INDICATION: 40 pound unintended weight loss, jaundice, near syncope, suspicion for underlying malignancy  COMPARISON:  2/2/2021  TECHNIQUE: CT scan of the chest, abdomen, and pelvis was performed following injection of IV contrast. Multiplanar reformats were obtained. Dose reduction techniques were used.   CONTRAST: 88mL Isovue 370     FINDINGS:   LUNGS AND PLEURA: 13 mm right upper lobe ground glass nodule, new in the interval, image 64. Right middle lobe bronchiectasis, increased in interval. Calcified right lower lobe granuloma.     MEDIASTINUM/AXILLAE: No significant mediastinal or hilar adenopathy. Normal heart size.     CORONARY ARTERY CALCIFICATION: Severe.     HEPATOBILIARY: Hepatomegaly. New diffuse hepatic nodularity compatible with cirrhosis. Gallbladder is moderately distended and mildly thick-walled with numerous stones. No ductal dilatation. Recanalized paraumbilical vein.     PANCREAS: Unremarkable     SPLEEN: Spleen has increased in size now measuring 13.9 cm in AP dimension.     ADRENAL GLANDS: Unremarkable     KIDNEYS/BLADDER: No hydronephrosis. Chronic bladder wall thickening, likely related to chronic outlet obstruction.     BOWEL: No small bowel obstruction. Anastomotic suture line in the right midabdomen status post partial colectomy. Uncomplicated colonic diverticula.     LYMPH NODES: Scattered subcentimeter reactive upper abdominal lymph nodes. Nonspecific stranding in the retroperitoneum, greatest in the left upper quadrant, image 167, series 3.     VASCULATURE: Moderate atherosclerotic disease without abdominal aortic aneurysm. Patent portal vein. Recanalized paraumbilical vein, with prominent anterior abdominal collaterals. Increasing perirectal collaterals.     PELVIC ORGANS: Mild prostatic hypertrophy. No free fluid.     MUSCULOSKELETAL: No acute bony abnormalities                                                                      IMPRESSION:  1.  Morphologic changes of cirrhosis and portal hypertension.  2.  Distended thick-walled gallbladder with dependent stones. Correlate clinically to exclude  cholecystitis and consider ultrasound if warranted.  3.  New right upper lobe groundglass nodule. Adenocarcinoma not excluded. If more recent comparison images are unavailable, six-month follow-up recommended per Fleischner Society guidelines.  4.  Nonspecific left upper quadrant retroperitoneal stranding. Attention at follow-up advised.    EXAM: US ABDOMEN LIMITED  LOCATION: [facility]  DATE: 7/18/2025     INDICATION: Jaundice, weight loss, abnormal CT, eval cholecysitis or choledocholithiasis.  COMPARISON: CT chest, abdomen and pelvis on 7/18/2025.  TECHNIQUE: Limited abdominal ultrasound.     FINDINGS:     GALLBLADDER: Cholelithiasis and borderline gallbladder wall thickening measuring 3 mm. No significant pericholecystic fluid. Sonographic Dennis's sign is negative.     BILE DUCTS: No biliary dilatation. The common duct measures 2 mm.     LIVER: Demonstrates increased hepatic parenchymal parenchymal echogenicity and mild surface nodularity. No focal mass. The portal vein is patent with flow in the normal direction.     RIGHT KIDNEY: No hydronephrosis.     PANCREAS: The visualized portions are normal.     No ascites.                                                                      IMPRESSION:  1.  Cholelithiasis and borderline gallbladder wall thickening. No other sonographic evidence of acute cholecystitis.  2.  Hepatic steatosis and mild surface nodularity of the liver, suggesting chronic parenchymal liver disease, including hepatic steatosis with or without fibrosis. No focal hepatic masses visualized.    Jordon Schaefer MD  Hospitalist  [facility]

## 2025-07-21 NOTE — ED TRIAGE NOTES
"Last drink on Thursday. Intentionally withdrawing. Taking valium. Last dose at 2100. Wife is noticing agitation, hallucination (visual - seeing people in his house). Pt states \"I hosted a party and a bunch of [place] people were swinging from my ceiling so I didn't get any sleep that night.\" Wife is shaking her head while pt telling a story that he believes to be true.        "

## 2025-07-22 ENCOUNTER — PRE VISIT (OUTPATIENT)
Dept: ONCOLOGY | Facility: CLINIC | Age: 50
End: 2025-07-22
Payer: COMMERCIAL

## 2025-07-22 LAB
ALBUMIN SERPL BCG-MCNC: 3.1 G/DL (ref 3.5–5.2)
ALP SERPL-CCNC: 107 U/L (ref 40–150)
ALT SERPL W P-5'-P-CCNC: 20 U/L (ref 0–70)
ANION GAP SERPL CALCULATED.3IONS-SCNC: 13 MMOL/L (ref 7–15)
AST SERPL W P-5'-P-CCNC: 122 U/L (ref 0–45)
ATRIAL RATE - MUSE: 78 BPM
BILIRUB DIRECT SERPL-MCNC: 3.73 MG/DL (ref 0–0.3)
BILIRUB SERPL-MCNC: 6 MG/DL
BUN SERPL-MCNC: 7.1 MG/DL (ref 6–20)
CALCIUM SERPL-MCNC: 8.6 MG/DL (ref 8.8–10.4)
CHLORIDE SERPL-SCNC: 98 MMOL/L (ref 98–107)
CREAT SERPL-MCNC: 0.69 MG/DL (ref 0.67–1.17)
DIASTOLIC BLOOD PRESSURE - MUSE: NORMAL MMHG
EGFRCR SERPLBLD CKD-EPI 2021: >90 ML/MIN/1.73M2
ERYTHROCYTE [DISTWIDTH] IN BLOOD BY AUTOMATED COUNT: 13.8 % (ref 10–15)
GLUCOSE SERPL-MCNC: 89 MG/DL (ref 70–99)
HCO3 SERPL-SCNC: 22 MMOL/L (ref 22–29)
HCT VFR BLD AUTO: 27.6 % (ref 40–53)
HGB BLD-MCNC: 9.4 G/DL (ref 13.3–17.7)
INR PPP: 2.09 (ref 0.85–1.15)
INTERPRETATION ECG - MUSE: NORMAL
MAGNESIUM SERPL-MCNC: 1.3 MG/DL (ref 1.7–2.3)
MAGNESIUM SERPL-MCNC: 2.1 MG/DL (ref 1.7–2.3)
MCH RBC QN AUTO: 37 PG (ref 26.5–33)
MCHC RBC AUTO-ENTMCNC: 34.1 G/DL (ref 31.5–36.5)
MCV RBC AUTO: 109 FL (ref 78–100)
P AXIS - MUSE: 48 DEGREES
PHOSPHATE SERPL-MCNC: 2.6 MG/DL (ref 2.5–4.5)
PLATELET # BLD AUTO: 71 10E3/UL (ref 150–450)
POTASSIUM SERPL-SCNC: 3.5 MMOL/L (ref 3.4–5.3)
PR INTERVAL - MUSE: 220 MS
PROT SERPL-MCNC: 7.1 G/DL (ref 6.4–8.3)
PROTHROMBIN TIME: 23.3 SECONDS (ref 11.8–14.8)
QRS DURATION - MUSE: 104 MS
QT - MUSE: 436 MS
QTC - MUSE: 497 MS
R AXIS - MUSE: -1 DEGREES
RBC # BLD AUTO: 2.54 10E6/UL (ref 4.4–5.9)
SODIUM SERPL-SCNC: 133 MMOL/L (ref 135–145)
SYSTOLIC BLOOD PRESSURE - MUSE: NORMAL MMHG
T AXIS - MUSE: 5 DEGREES
VENTRICULAR RATE- MUSE: 78 BPM
WBC # BLD AUTO: 7.3 10E3/UL (ref 4–11)

## 2025-07-22 PROCEDURE — 85610 PROTHROMBIN TIME: CPT | Performed by: INTERNAL MEDICINE

## 2025-07-22 PROCEDURE — 250N000011 HC RX IP 250 OP 636: Performed by: INTERNAL MEDICINE

## 2025-07-22 PROCEDURE — 36415 COLL VENOUS BLD VENIPUNCTURE: CPT | Performed by: HOSPITALIST

## 2025-07-22 PROCEDURE — 83735 ASSAY OF MAGNESIUM: CPT | Performed by: INTERNAL MEDICINE

## 2025-07-22 PROCEDURE — 120N000001 HC R&B MED SURG/OB

## 2025-07-22 PROCEDURE — 84100 ASSAY OF PHOSPHORUS: CPT | Performed by: HOSPITALIST

## 2025-07-22 PROCEDURE — 36415 COLL VENOUS BLD VENIPUNCTURE: CPT | Performed by: INTERNAL MEDICINE

## 2025-07-22 PROCEDURE — 85014 HEMATOCRIT: CPT | Performed by: HOSPITALIST

## 2025-07-22 PROCEDURE — 250N000013 HC RX MED GY IP 250 OP 250 PS 637: Performed by: INTERNAL MEDICINE

## 2025-07-22 PROCEDURE — 82248 BILIRUBIN DIRECT: CPT | Performed by: HOSPITALIST

## 2025-07-22 PROCEDURE — 93010 ELECTROCARDIOGRAM REPORT: CPT | Performed by: INTERNAL MEDICINE

## 2025-07-22 PROCEDURE — 250N000013 HC RX MED GY IP 250 OP 250 PS 637: Performed by: HOSPITALIST

## 2025-07-22 PROCEDURE — 99233 SBSQ HOSP IP/OBS HIGH 50: CPT | Performed by: INTERNAL MEDICINE

## 2025-07-22 PROCEDURE — 83735 ASSAY OF MAGNESIUM: CPT | Performed by: HOSPITALIST

## 2025-07-22 PROCEDURE — 93005 ELECTROCARDIOGRAM TRACING: CPT

## 2025-07-22 RX ORDER — MAGNESIUM SULFATE HEPTAHYDRATE 40 MG/ML
4 INJECTION, SOLUTION INTRAVENOUS ONCE
Status: COMPLETED | OUTPATIENT
Start: 2025-07-22 | End: 2025-07-22

## 2025-07-22 RX ADMIN — GABAPENTIN 900 MG: 300 CAPSULE ORAL at 17:21

## 2025-07-22 RX ADMIN — GABAPENTIN 900 MG: 300 CAPSULE ORAL at 03:10

## 2025-07-22 RX ADMIN — SODIUM CHLORIDE AND POTASSIUM CHLORIDE: .9; .15 SOLUTION INTRAVENOUS at 09:23

## 2025-07-22 RX ADMIN — Medication 1 TABLET: at 08:05

## 2025-07-22 RX ADMIN — THIAMINE HCL TAB 100 MG 100 MG: 100 TAB at 08:05

## 2025-07-22 RX ADMIN — PANTOPRAZOLE SODIUM 40 MG: 40 INJECTION, POWDER, LYOPHILIZED, FOR SOLUTION INTRAVENOUS at 20:32

## 2025-07-22 RX ADMIN — CLONIDINE HYDROCHLORIDE 0.1 MG: 0.1 TABLET ORAL at 17:21

## 2025-07-22 RX ADMIN — GABAPENTIN 900 MG: 300 CAPSULE ORAL at 09:23

## 2025-07-22 RX ADMIN — CLONIDINE HYDROCHLORIDE 0.1 MG: 0.1 TABLET ORAL at 08:06

## 2025-07-22 RX ADMIN — PANTOPRAZOLE SODIUM 40 MG: 40 INJECTION, POWDER, LYOPHILIZED, FOR SOLUTION INTRAVENOUS at 08:05

## 2025-07-22 RX ADMIN — CLONIDINE HYDROCHLORIDE 0.1 MG: 0.1 TABLET ORAL at 00:31

## 2025-07-22 RX ADMIN — LACTULOSE 20 G: 20 SOLUTION ORAL at 08:05

## 2025-07-22 RX ADMIN — CLONIDINE HYDROCHLORIDE 0.1 MG: 0.1 TABLET ORAL at 23:54

## 2025-07-22 RX ADMIN — MAGNESIUM SULFATE HEPTAHYDRATE 4 G: 40 INJECTION, SOLUTION INTRAVENOUS at 09:23

## 2025-07-22 RX ADMIN — LACTULOSE 20 G: 20 SOLUTION ORAL at 20:32

## 2025-07-22 RX ADMIN — FOLIC ACID 1 MG: 1 TABLET ORAL at 08:05

## 2025-07-22 ASSESSMENT — ACTIVITIES OF DAILY LIVING (ADL)
ADLS_ACUITY_SCORE: 46
ADLS_ACUITY_SCORE: 41
ADLS_ACUITY_SCORE: 39
ADLS_ACUITY_SCORE: 39
ADLS_ACUITY_SCORE: 30
ADLS_ACUITY_SCORE: 39
ADLS_ACUITY_SCORE: 39
ADLS_ACUITY_SCORE: 42
ADLS_ACUITY_SCORE: 30
ADLS_ACUITY_SCORE: 30
ADLS_ACUITY_SCORE: 39
ADLS_ACUITY_SCORE: 39
ADLS_ACUITY_SCORE: 41
ADLS_ACUITY_SCORE: 39
ADLS_ACUITY_SCORE: 39
ADLS_ACUITY_SCORE: 41
ADLS_ACUITY_SCORE: 42
ADLS_ACUITY_SCORE: 30
ADLS_ACUITY_SCORE: 39
ADLS_ACUITY_SCORE: 42
ADLS_ACUITY_SCORE: 46

## 2025-07-22 NOTE — PLAN OF CARE
[id number]  Pt is alert and oriented x2- time and situation on 1L O2 NC. VSS. 1 person assist with gaitbelt. Regular diet. PIV CDI, no signs of inflammation or infiltration; infusing NS Kcl 20 at 75 mL. Pt is on tele. K, Mg, and PH protocol. CIWA. GI and nutrition onboard. Possible EGD on 7/22. Discharge plan is still in progress. Plan of care is still continuing.     Goal Outcome Evaluation:      Plan of Care Reviewed With: patient    Overall Patient Progress: improvingOverall Patient Progress: improving       Problem: Adult Inpatient Plan of Care  Goal: Plan of Care Review  Description: The Plan of Care Review/Shift note should be completed every shift.  The Outcome Evaluation is a brief statement about your assessment that the patient is improving, declining, or no change.  This information will be displayed automatically on your shift  note.  Outcome: Progressing  Flowsheets (Taken 7/22/2025 0572)  Plan of Care Reviewed With: patient  Overall Patient Progress: improving

## 2025-07-22 NOTE — PROGRESS NOTES
"GASTROENTEROLOGY PROGRESS NOTE     SUBJECTIVE:  More alert today, sitting up in chair and conversational. No nausea or vomiting. No black or bloody stools. No abdominal pain.      OBJECTIVE:  /74 (BP Location: Left arm)   Pulse 80   Temp 98.1  F (36.7  C) (Oral)   Resp 18   Ht 1.778 m (5' 10\")   Wt 79 kg (174 lb 2.6 oz)   SpO2 100%   BMI 24.99 kg/m    Temp (24hrs), Av.3  F (36.8  C), Min:98.1  F (36.7  C), Max:98.4  F (36.9  C)    Patient Vitals for the past 72 hrs:   Weight   25 2304 79 kg (174 lb 2.6 oz)     No intake or output data in the 24 hours ending 25 1028     PHYSICAL EXAM  GENERAL: No obvious distress  EYES: Scleral icterus  ABDOMEN: Mildly distended. Soft. Non tender.   SKIN: Jaundiced  NEUROLOGIC: Alert and oriented x3  PSYCHIATRIC: Normal affect     Additional Comments:  ROS, FH, SH: See initial GI consult for details.     I have reviewed the patient's new clinical lab results:     Recent Labs   Lab Test 25  0736 25  1822 25  0706 25   WBC 7.3  --  7.2 7.8 7.9   HGB 9.4* 9.6* 9.9* 11.3* 11.3*   * 108* 106* 104* 104*   PLT 71*  --  66* 67* 77*   INR 2.09*  --   --  2.00* 1.51*     Recent Labs   Lab Test 25  0736 25  1400 25  0706 250   POTASSIUM 3.5 3.5 3.0* 3.2*   CHLORIDE 98  --  95* 86*   CO2 22  --  27 28   BUN 7.1  --  7.1 8.5   ANIONGAP 13  --  10 12     Recent Labs   Lab Test 25  0736 25  0706 25  0023 25  2330 25  2131 25  18324  0858   ALBUMIN 3.1* 3.4*  --  3.8  --  3.9   < >  --    BILITOTAL 6.0* 6.1*  --  7.1*  --  5.1*   < >  --    ALT 20 24  --  28  --  27   < >  --    * 150*  --  176*  --  200*   < >  --    PROTEIN  --   --  Negative  --  Negative  --   --  50*   LIPASE  --   --   --  120*  --  80*  --   --     < > = values in this interval not displayed.     MELD 3.0: 23 at 2025  7:36 AM  MELD-Na: 24 at " 7/22/2025  7:36 AM  Calculated from:  Serum Creatinine: 0.69 mg/dL (Using min of 1 mg/dL) at 7/22/2025  7:36 AM  Serum Sodium: 133 mmol/L at 7/22/2025  7:36 AM  Total Bilirubin: 6 mg/dL at 7/22/2025  7:36 AM  Serum Albumin: 3.1 g/dL at 7/22/2025  7:36 AM  INR(ratio): 2.09 at 7/22/2025  7:36 AM  Age at listing (hypothetical): 49 years  Sex: Male at 7/22/2025  7:36 AM    MDF = 41.5    IMAGING:   US ABDOMEN LIMITED 7/18/2025  IMPRESSION:  1.  Cholelithiasis and borderline gallbladder wall thickening. No other sonographic evidence of acute cholecystitis.  2.  Hepatic steatosis and mild surface nodularity of the liver, suggesting chronic parenchymal liver disease, including hepatic steatosis with or without fibrosis. No focal hepatic masses visualized.     CT CHEST ABDOMEN PELVIS W CONTRAST 7/18/2025  IMPRESSION:  1.  Morphologic changes of cirrhosis and portal hypertension.  2.  Distended thick-walled gallbladder with dependent stones. Correlate clinically to exclude cholecystitis and consider ultrasound if warranted.  3.  New right upper lobe groundglass nodule. Adenocarcinoma not excluded. If more recent comparison images are unavailable, six-month follow-up recommended per Fleischner Society guidelines.  4.  Nonspecific left upper quadrant retroperitoneal stranding. Attention at follow-up advised.     ASSESSMENT:     Acute alcohol hepatitis. Patient has MELD score of 24 and MDF of 41.5. Will monitor and consider glucocorticoids in next few days if labs remain elevated.   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 CT with morphologic changes of cirrhosis and portal hypertension. Suspect alcohol as cause but checked viral hepatitis serologies.  Hepatitis B serologies consistent with recovery from prior hep B infection.   Confusion. Possible hepatic encephalopathy vs alcohol withdrawal. Ammonia mildly elevated at 61 on admission. Patient received one dose of lactulose. Confusion is improved today.   Anemia. Hgb 11.3 on arrival. Hgb  9.9 today. No overt bleeding. Patient at risk for alcohol gastritis or portal hypertensive gastropathy in setting of cirrhosis. Will consider EGD pending clinical course, however no plans for this currently.     PLAN:  - Alcohol withdrawal protocol per primary   - Thiamine and folic acid  - Monitor MELD labs daily  - Monitor hgb, transfuse prn   - IV PPI BID  - Fluid and electrolyte management per primary   - Alcohol cessation  - Outpatient follow-up with Hepatology. [facility] will contact patient to arrange.     Discussed patient findings and plan with Dr. Woodward.     Total time: 25 minutes of total time was spent providing patient care, including patient evaluation, reviewing documentation/test results, and .     Helen Arshad PA-C  [facility] ([facility])

## 2025-07-22 NOTE — TELEPHONE ENCOUNTER
RECORDS STATUS - ALL OTHER DIAGNOSIS      Imaging Received  July 24, 2025 5:59 AM ABT   Action: Images from  received and resolved to PACS.     RECORDS RECEIVED FROM:    DIAGNOSIS: Pulmonary nodule [R91.1]    NOTES STATUS DETAILS   OFFICE NOTE from referring provider  Dr. Jaquan Garcia   OFFICE NOTE from medical oncologist     OFFICE NOTE from other specialist     DISCHARGE SUMMARY from hospital     DISCHARGE REPORT from the ER     OPERATIVE REPORT     MEDICATION LIST     LABS     PATHOLOGY REPORTS     ANYTHING RELATED TO DIAGNOSIS     PATHOLOGY [employer] TRACKING   Tracking #:   GENONOMIC TESTING     TYPE:     IMAGING (NEED IMAGES & REPORT)     CT SCANS PACS HP:  02/02/21-12/07/16: CT CAP   MRI     XRAYS     ULTRASOUND     PET     IMAGE DISC [employer] TRACKING   Tracking #:

## 2025-07-22 NOTE — PROGRESS NOTES
[facility]  Hospitalist Progress Note  Morenita Gusman MD 07/22/2025    Reason for Stay (Diagnosis): decompensated new cirrhotic          Assessment and Plan:      Summary of Stay: Carlos A Lopez is a 49 year old male with a history of htn, hx of colon ca s/p right hemicolectomy followed by chemo, alcohol abuse and dependence admitted on 7/20/2025 with visual hallucinations     Here in the ED 2 days pta and noted to have a bili 5 and CT CAP with e/o cirrhosis with portal htn.  Incidentally noted to have cholelithiasis and GBW thickening without e/o cholecystitis. Spouse noted sclera icterus about a week ago. BAL at the visit 0.23 but denied significant drinking.  Discharged home with prn diazepam.  He did not have any alcohol and then started having visual hallucinations that seem real to him.He described seeing multiple women performing acrobatics including a trapeze in his home before coming in and then a man came and sat down by him which he thought was weird.     He also reports unintentional weight loss of 40 # over the last 6 months     VS with hypertension/tachycardia/and mildly elevated temp  CMP with hyponatremia 126, K 3.2, Mag, 1.0, phos 2.3.  LFTs  AST//24, bili 6.1.  INR  2.0  CBC macrocytic anemia 9.9, platelets low at 66        Problem List:   Visual hallucinations  Seem most consistent with alcohol withdrawal although HE could be contributing.   Much improved today after one dose of lactulose and treatment for withdrawal     Alcohol withdrawal, dependence  Improved with alcohol withdrawal protocol  -remains on gabapentin taper, clonidine 0.1 mg bid  -continue with CIWA driven  benzo's   -we discussed that his liver will eventually fail if he doesn't stop drinking and he understands this and he's interested in sobriety and open to chem dep eval-ordered     Cirrhosis,new diagnosis with decompensation   Splenomegaly  Appreciate GI weighing in.  Considering steroids but will monitor  "for the next day or so.  Hep serologies consistent with recovery from Hep B infection.  Almost certainly all due to alcohol.  He's motivated to stop drinking     Multiple metabolic derangements  Prolonged QTc  Hyponatremia 2nd etoh and improving appropriately - stopped IVF  Hypomag and hypoK replaced and rechecked  QTc improving     Macrocytic anemia and thrombocytopenia   2* alcohol   No indication for transfusion     Ataxic  Suspect MF, deconditioning/wtloss, medications, potential cerebellar effect of alcohol     Htn  Was not on any medications pta  -monitor, likely withdrawal and stress    DVT Prophylaxis: PCDs  Code Status: Full Code  Functional Status:  Diet:  Evans:  Access :      Medically Ready for Discharge: Anticipated in 2-4 Days     Securely message with [employer] (more info)  Text page via [employer] Paging/Directory       I spent 55 minutes reviewing epic including prior labs/imaging/medical history and notes related to this encounter  In addition time was spent in interveiwing the patient, communicating with contacts, and medical decision making      Interval History (Subjective):      Feeling pretty good today, no pain, thinking is clearing.  Remains off balance                   Physical Exam:      Last Vital Signs:  /69 (BP Location: Left arm)   Pulse 82   Temp 98.1  F (36.7  C) (Oral)   Resp 20   Ht 1.778 m (5' 10\")   Wt 79 kg (174 lb 2.6 oz)   SpO2 97%   BMI 24.99 kg/m      I/O:  Pleasant jaundiced nad looks stated age head nc/at sclera icteric lungs ctab nl effort rrr no mrg no le edema belly rounded mild firmness but denies tenderness. Skin warm and dry no cyanosis or clubbing affect appropriate alert and answers questions appropriately           Medications:      All current medications were reviewed with changes reflected in problem list.         Data:      All new lab and imaging data was reviewed.   Labs:  Recent Labs   Lab 07/22/25  0736   *   POTASSIUM 3.5   CHLORIDE 98   CO2 " 22   ANIONGAP 13   GLC 89   BUN 7.1   CR 0.69   GFRESTIMATED >90   RACIEL 8.6*     Recent Labs   Lab 07/22/25 0736   WBC 7.3   HGB 9.4*   HCT 27.6*   *   PLT 71*     Recent Labs   Lab 07/22/25  0736 07/21/25  0706 07/20/25 2330 07/18/25 1953   GLC 89 105* 108* 96     Recent Labs   Lab 07/22/25  0736 07/21/25  0706 07/20/25  2330 07/18/25 1953   * 150* 176* 200*   ALT 20 24 28 27   ALKPHOS 107 126 149 161*   BILITOTAL 6.0* 6.1* 7.1* 5.1*   DILLON  --  54 61* 32      Imaging:   Results for orders placed or performed during the hospital encounter of 07/18/25   CT Chest/Abdomen/Pelvis w Contrast    Narrative    EXAM: CT CHEST/ABDOMEN/PELVIS W CONTRAST  LOCATION: [facility]  DATE: 7/18/2025    INDICATION: 40 pound unintended weight loss, jaundice, near syncope, suspicion for underlying malignancy  COMPARISON: 2/2/2021  TECHNIQUE: CT scan of the chest, abdomen, and pelvis was performed following injection of IV contrast. Multiplanar reformats were obtained. Dose reduction techniques were used.   CONTRAST: 88mL Isovue 370    FINDINGS:   LUNGS AND PLEURA: 13 mm right upper lobe ground glass nodule, new in the interval, image 64. Right middle lobe bronchiectasis, increased in interval. Calcified right lower lobe granuloma.    MEDIASTINUM/AXILLAE: No significant mediastinal or hilar adenopathy. Normal heart size.    CORONARY ARTERY CALCIFICATION: Severe.    HEPATOBILIARY: Hepatomegaly. New diffuse hepatic nodularity compatible with cirrhosis. Gallbladder is moderately distended and mildly thick-walled with numerous stones. No ductal dilatation. Recanalized paraumbilical vein.    PANCREAS: Unremarkable    SPLEEN: Spleen has increased in size now measuring 13.9 cm in AP dimension.    ADRENAL GLANDS: Unremarkable    KIDNEYS/BLADDER: No hydronephrosis. Chronic bladder wall thickening, likely related to chronic outlet obstruction.    BOWEL: No small bowel obstruction. Anastomotic suture line in  the right midabdomen status post partial colectomy. Uncomplicated colonic diverticula.    LYMPH NODES: Scattered subcentimeter reactive upper abdominal lymph nodes. Nonspecific stranding in the retroperitoneum, greatest in the left upper quadrant, image 167, series 3.    VASCULATURE: Moderate atherosclerotic disease without abdominal aortic aneurysm. Patent portal vein. Recanalized paraumbilical vein, with prominent anterior abdominal collaterals. Increasing perirectal collaterals.    PELVIC ORGANS: Mild prostatic hypertrophy. No free fluid.    MUSCULOSKELETAL: No acute bony abnormalities      Impression    IMPRESSION:  1.  Morphologic changes of cirrhosis and portal hypertension.  2.  Distended thick-walled gallbladder with dependent stones. Correlate clinically to exclude cholecystitis and consider ultrasound if warranted.  3.  New right upper lobe groundglass nodule. Adenocarcinoma not excluded. If more recent comparison images are unavailable, six-month follow-up recommended per Fleischner Society guidelines.  4.  Nonspecific left upper quadrant retroperitoneal stranding. Attention at follow-up advised.   US Abdomen Limited    Narrative    EXAM: US ABDOMEN LIMITED  LOCATION: [facility]  DATE: 7/18/2025    INDICATION: Jaundice, weight loss, abnormal CT, eval cholecysitis or choledocholithiasis.  COMPARISON: CT chest, abdomen and pelvis on 7/18/2025.  TECHNIQUE: Limited abdominal ultrasound.    FINDINGS:    GALLBLADDER: Cholelithiasis and borderline gallbladder wall thickening measuring 3 mm. No significant pericholecystic fluid. Sonographic Dennis's sign is negative.    BILE DUCTS: No biliary dilatation. The common duct measures 2 mm.    LIVER: Demonstrates increased hepatic parenchymal parenchymal echogenicity and mild surface nodularity. No focal mass. The portal vein is patent with flow in the normal direction.    RIGHT KIDNEY: No hydronephrosis.    PANCREAS: The visualized portions are  normal.    No ascites.      Impression    IMPRESSION:  1.  Cholelithiasis and borderline gallbladder wall thickening. No other sonographic evidence of acute cholecystitis.  2.  Hepatic steatosis and mild surface nodularity of the liver, suggesting chronic parenchymal liver disease, including hepatic steatosis with or without fibrosis. No focal hepatic masses visualized.

## 2025-07-22 NOTE — PLAN OF CARE
"Vitals are Temp: 98.4  F (36.9  C) Temp src: Oral BP: 130/74 Pulse: 89   Resp: 16 SpO2: 95 %.  Patient is Alert and Oriented x4. Lethargic. They are 1 Assist with Gait Belt and Walker.  Pt is a Regular diet.  They are denying pain.  Patient has Normal Saline 0.9% and KCl 20 mEq running at 75 mL per hour. Denies nausea/dizziness/SOB. CIWA 2. Patient started experiencing visual hallucinations, repeat CIWA 14 and PRN Valium administered. Electrolytes replaced per protocol and recheck in the morning. Nutrition, GI and SW following. Plan for possible EGD.     Goal Outcome Evaluation:      Plan of Care Reviewed With: patient    Overall Patient Progress: no change    Outcome Evaluation: Denies pain. CIWA 2. Lethargic. Possible EGD      Problem: Adult Inpatient Plan of Care  Goal: Plan of Care Review  Description: The Plan of Care Review/Shift note should be completed every shift.  The Outcome Evaluation is a brief statement about your assessment that the patient is improving, declining, or no change.  This information will be displayed automatically on your shift  note.  7/21/2025 2218 by Keila Ward RN  Outcome: Progressing  Flowsheets (Taken 7/21/2025 2218)  Outcome Evaluation: Denies pain. CIWA 2. Lethargic. Possible EGD  Plan of Care Reviewed With: patient  Overall Patient Progress: no change  7/21/2025 2217 by Keila Ward RN  Outcome: Progressing  Flowsheets (Taken 7/21/2025 2217)  Outcome Evaluation: Tylenol for pain. CLD. Denies nausea.  Plan of Care Reviewed With: patient  Overall Patient Progress: no change  Goal: Patient-Specific Goal (Individualized)  Description: You can add care plan individualizations to a care plan. Examples of Individualization might be:  \"Parent requests to be called daily at 9am for status\", \"I have a hard time hearing out of my right ear\", or \"Do not touch me to wake me up as it startles  me\".  7/21/2025 2218 by Keila Ward RN  Outcome: Progressing  7/21/2025 2217 " by Keila Ward RN  Outcome: Progressing  Goal: Absence of Hospital-Acquired Illness or Injury  7/21/2025 2218 by Keila Ward RN  Outcome: Progressing  7/21/2025 2217 by Keila Ward RN  Outcome: Progressing  Intervention: Identify and Manage Fall Risk  Recent Flowsheet Documentation  Taken 7/21/2025 2022 by Keila Ward RN  Safety Promotion/Fall Prevention:   activity supervised   assistive device/personal items within reach   clutter free environment maintained   nonskid shoes/slippers when out of bed   room near nurse's station   safety round/check completed   supervised activity  Intervention: Prevent Skin Injury  Recent Flowsheet Documentation  Taken 7/21/2025 2022 by Keila Ward RN  Body Position: position changed independently  Intervention: Prevent and Manage VTE (Venous Thromboembolism) Risk  Recent Flowsheet Documentation  Taken 7/21/2025 2022 by Keila Ward RN  VTE Prevention/Management: SCDs off (sequential compression devices)  Goal: Optimal Comfort and Wellbeing  7/21/2025 2218 by Keila Ward RN  Outcome: Progressing  7/21/2025 2217 by Keila Ward RN  Outcome: Progressing  Goal: Readiness for Transition of Care  7/21/2025 2218 by Keila Ward RN  Outcome: Progressing  7/21/2025 2217 by Keila Ward RN  Outcome: Progressing     Problem: Fall Injury Risk  Goal: Absence of Fall and Fall-Related Injury  7/21/2025 2218 by Keila Ward RN  Outcome: Progressing  7/21/2025 2217 by Keila Ward RN  Outcome: Progressing  Intervention: Identify and Manage Contributors  Recent Flowsheet Documentation  Taken 7/21/2025 2022 by Keila Ward RN  Medication Review/Management: medications reviewed  Intervention: Promote Injury-Free Environment  Recent Flowsheet Documentation  Taken 7/21/2025 2022 by Keila Ward RN  Safety Promotion/Fall Prevention:   activity supervised   assistive device/personal items within reach    clutter free environment maintained   nonskid shoes/slippers when out of bed   room near nurse's station   safety round/check completed   supervised activity     Problem: Alcohol Withdrawal  Goal: Alcohol Withdrawal Symptom Control  7/21/2025 2218 by Keila Ward RN  Outcome: Progressing  7/21/2025 2217 by Keila Ward RN  Outcome: Progressing  Goal: Optimal Neurologic Function  7/21/2025 2218 by Keila Ward RN  Outcome: Progressing  7/21/2025 2217 by Keila Ward RN  Outcome: Progressing  Goal: Readiness for Change Identified  7/21/2025 2218 by Keila Ward RN  Outcome: Progressing  7/21/2025 2217 by Keila Ward RN  Outcome: Progressing

## 2025-07-22 NOTE — PLAN OF CARE
"Goal Outcome Evaluation:    End of Shift Summary  For vital signs and complete assessments, please see documentation flowsheets.     Pertinent assessments: Pt A&OX4. Vss and on RA. Ls clear and no sob noted. Tolerating diet and no nausea reported. Denied pain. Impulsive at times during the shift and redirected. Ciwa 0,0,2. Tele reading SR 77. Mg replaced and recheck WNL.    Major Shift Events None.    Treatment Plan: CIWA,  Tele, monitor electrolytes, symptom management.  Bedside Nurse: Dolores Brown RN         Plan of Care Reviewed With: patient    Overall Patient Progress: improvingOverall Patient Progress: improving    Outcome Evaluation: Denies pain. CIWA 0,0,2.    Problem: Adult Inpatient Plan of Care  Goal: Plan of Care Review  Description: The Plan of Care Review/Shift note should be completed every shift.  The Outcome Evaluation is a brief statement about your assessment that the patient is improving, declining, or no change.  This information will be displayed automatically on your shift  note.  Outcome: Progressing  Flowsheets (Taken 7/22/2025 5015)  Outcome Evaluation: Denies pain. CIWA 0,0,2.  Plan of Care Reviewed With: patient  Overall Patient Progress: improving  Goal: Patient-Specific Goal (Individualized)  Description: You can add care plan individualizations to a care plan. Examples of Individualization might be:  \"Parent requests to be called daily at 9am for status\", \"I have a hard time hearing out of my right ear\", or \"Do not touch me to wake me up as it startles  me\".  Outcome: Progressing  Goal: Absence of Hospital-Acquired Illness or Injury  Outcome: Progressing  Goal: Optimal Comfort and Wellbeing  Outcome: Progressing  Goal: Readiness for Transition of Care  Outcome: Progressing     Problem: Fall Injury Risk  Goal: Absence of Fall and Fall-Related Injury  Outcome: Progressing     Problem: Alcohol Withdrawal  Goal: Alcohol Withdrawal Symptom Control  Outcome: Progressing  Goal: Optimal " Neurologic Function  Outcome: Progressing  Goal: Readiness for Change Identified  Outcome: Progressing

## 2025-07-23 ENCOUNTER — APPOINTMENT (OUTPATIENT)
Dept: PHYSICAL THERAPY | Facility: CLINIC | Age: 50
End: 2025-07-23
Attending: INTERNAL MEDICINE
Payer: COMMERCIAL

## 2025-07-23 LAB
ALBUMIN SERPL BCG-MCNC: 3.2 G/DL (ref 3.5–5.2)
ALP SERPL-CCNC: 111 U/L (ref 40–150)
ALT SERPL W P-5'-P-CCNC: 21 U/L (ref 0–70)
ANION GAP SERPL CALCULATED.3IONS-SCNC: 12 MMOL/L (ref 7–15)
AST SERPL W P-5'-P-CCNC: 102 U/L (ref 0–45)
ATRIAL RATE - MUSE: 76 BPM
BILIRUB SERPL-MCNC: 5.8 MG/DL
BUN SERPL-MCNC: 7.3 MG/DL (ref 6–20)
CALCIUM SERPL-MCNC: 8.7 MG/DL (ref 8.8–10.4)
CHLORIDE SERPL-SCNC: 95 MMOL/L (ref 98–107)
CREAT SERPL-MCNC: 0.66 MG/DL (ref 0.67–1.17)
DIASTOLIC BLOOD PRESSURE - MUSE: NORMAL MMHG
EGFRCR SERPLBLD CKD-EPI 2021: >90 ML/MIN/1.73M2
ERYTHROCYTE [DISTWIDTH] IN BLOOD BY AUTOMATED COUNT: 13.9 % (ref 10–15)
GLUCOSE SERPL-MCNC: 97 MG/DL (ref 70–99)
HCO3 SERPL-SCNC: 23 MMOL/L (ref 22–29)
HCT VFR BLD AUTO: 27 % (ref 40–53)
HGB BLD-MCNC: 9.5 G/DL (ref 13.3–17.7)
INTERPRETATION ECG - MUSE: NORMAL
MAGNESIUM SERPL-MCNC: 1.4 MG/DL (ref 1.7–2.3)
MAGNESIUM SERPL-MCNC: 2 MG/DL (ref 1.7–2.3)
MCH RBC QN AUTO: 37.8 PG (ref 26.5–33)
MCHC RBC AUTO-ENTMCNC: 35.2 G/DL (ref 31.5–36.5)
MCV RBC AUTO: 108 FL (ref 78–100)
P AXIS - MUSE: 51 DEGREES
PHOSPHATE SERPL-MCNC: 2.1 MG/DL (ref 2.5–4.5)
PLATELET # BLD AUTO: 84 10E3/UL (ref 150–450)
POTASSIUM SERPL-SCNC: 3.2 MMOL/L (ref 3.4–5.3)
POTASSIUM SERPL-SCNC: 3.7 MMOL/L (ref 3.4–5.3)
PR INTERVAL - MUSE: 236 MS
PROT SERPL-MCNC: 7.1 G/DL (ref 6.4–8.3)
QRS DURATION - MUSE: 104 MS
QT - MUSE: 430 MS
QTC - MUSE: 483 MS
R AXIS - MUSE: -4 DEGREES
RBC # BLD AUTO: 2.51 10E6/UL (ref 4.4–5.9)
SODIUM SERPL-SCNC: 130 MMOL/L (ref 135–145)
SYSTOLIC BLOOD PRESSURE - MUSE: NORMAL MMHG
T AXIS - MUSE: 4 DEGREES
VENTRICULAR RATE- MUSE: 76 BPM
WBC # BLD AUTO: 7 10E3/UL (ref 4–11)

## 2025-07-23 PROCEDURE — 97112 NEUROMUSCULAR REEDUCATION: CPT | Mod: GP

## 2025-07-23 PROCEDURE — 36415 COLL VENOUS BLD VENIPUNCTURE: CPT | Performed by: INTERNAL MEDICINE

## 2025-07-23 PROCEDURE — 120N000001 HC R&B MED SURG/OB

## 2025-07-23 PROCEDURE — 80053 COMPREHEN METABOLIC PANEL: CPT | Performed by: INTERNAL MEDICINE

## 2025-07-23 PROCEDURE — 99232 SBSQ HOSP IP/OBS MODERATE 35: CPT | Performed by: INTERNAL MEDICINE

## 2025-07-23 PROCEDURE — 250N000011 HC RX IP 250 OP 636: Performed by: INTERNAL MEDICINE

## 2025-07-23 PROCEDURE — 85027 COMPLETE CBC AUTOMATED: CPT | Performed by: INTERNAL MEDICINE

## 2025-07-23 PROCEDURE — 83735 ASSAY OF MAGNESIUM: CPT | Performed by: INTERNAL MEDICINE

## 2025-07-23 PROCEDURE — 84100 ASSAY OF PHOSPHORUS: CPT | Performed by: INTERNAL MEDICINE

## 2025-07-23 PROCEDURE — 93010 ELECTROCARDIOGRAM REPORT: CPT | Performed by: INTERNAL MEDICINE

## 2025-07-23 PROCEDURE — 250N000013 HC RX MED GY IP 250 OP 250 PS 637: Performed by: INTERNAL MEDICINE

## 2025-07-23 PROCEDURE — 84132 ASSAY OF SERUM POTASSIUM: CPT | Performed by: INTERNAL MEDICINE

## 2025-07-23 PROCEDURE — 93005 ELECTROCARDIOGRAM TRACING: CPT

## 2025-07-23 PROCEDURE — 97161 PT EVAL LOW COMPLEX 20 MIN: CPT | Mod: GP

## 2025-07-23 PROCEDURE — 250N000013 HC RX MED GY IP 250 OP 250 PS 637: Performed by: HOSPITALIST

## 2025-07-23 PROCEDURE — 97116 GAIT TRAINING THERAPY: CPT | Mod: GP

## 2025-07-23 RX ORDER — GABAPENTIN 300 MG/1
300 CAPSULE ORAL 2 TIMES DAILY
Status: DISCONTINUED | OUTPATIENT
Start: 2025-07-25 | End: 2025-07-26 | Stop reason: HOSPADM

## 2025-07-23 RX ORDER — POTASSIUM CHLORIDE 1500 MG/1
40 TABLET, EXTENDED RELEASE ORAL ONCE
Status: COMPLETED | OUTPATIENT
Start: 2025-07-23 | End: 2025-07-23

## 2025-07-23 RX ORDER — GABAPENTIN 600 MG/1
600 TABLET ORAL 2 TIMES DAILY
Status: COMPLETED | OUTPATIENT
Start: 2025-07-23 | End: 2025-07-24

## 2025-07-23 RX ORDER — MAGNESIUM SULFATE HEPTAHYDRATE 40 MG/ML
4 INJECTION, SOLUTION INTRAVENOUS ONCE
Status: COMPLETED | OUTPATIENT
Start: 2025-07-23 | End: 2025-07-23

## 2025-07-23 RX ORDER — GABAPENTIN 100 MG/1
100 CAPSULE ORAL 2 TIMES DAILY
Status: DISCONTINUED | OUTPATIENT
Start: 2025-07-27 | End: 2025-07-26 | Stop reason: HOSPADM

## 2025-07-23 RX ADMIN — THIAMINE HCL TAB 100 MG 100 MG: 100 TAB at 10:39

## 2025-07-23 RX ADMIN — GABAPENTIN 600 MG: 600 TABLET, FILM COATED ORAL at 21:14

## 2025-07-23 RX ADMIN — CLONIDINE HYDROCHLORIDE 0.1 MG: 0.1 TABLET ORAL at 17:25

## 2025-07-23 RX ADMIN — POTASSIUM CHLORIDE 40 MEQ: 20 TABLET, EXTENDED RELEASE ORAL at 10:39

## 2025-07-23 RX ADMIN — GABAPENTIN 900 MG: 300 CAPSULE ORAL at 02:04

## 2025-07-23 RX ADMIN — FOLIC ACID 1 MG: 1 TABLET ORAL at 10:39

## 2025-07-23 RX ADMIN — GABAPENTIN 900 MG: 300 CAPSULE ORAL at 10:39

## 2025-07-23 RX ADMIN — LACTULOSE 20 G: 20 SOLUTION ORAL at 21:14

## 2025-07-23 RX ADMIN — POTASSIUM & SODIUM PHOSPHATES POWDER PACK 280-160-250 MG 1 PACKET: 280-160-250 PACK at 17:25

## 2025-07-23 RX ADMIN — POTASSIUM & SODIUM PHOSPHATES POWDER PACK 280-160-250 MG 1 PACKET: 280-160-250 PACK at 10:36

## 2025-07-23 RX ADMIN — PANTOPRAZOLE SODIUM 40 MG: 40 INJECTION, POWDER, LYOPHILIZED, FOR SOLUTION INTRAVENOUS at 19:42

## 2025-07-23 RX ADMIN — POTASSIUM & SODIUM PHOSPHATES POWDER PACK 280-160-250 MG 1 PACKET: 280-160-250 PACK at 13:06

## 2025-07-23 RX ADMIN — LACTULOSE 20 G: 20 SOLUTION ORAL at 10:36

## 2025-07-23 RX ADMIN — DIAZEPAM 10 MG: 5 TABLET ORAL at 18:35

## 2025-07-23 RX ADMIN — CLONIDINE HYDROCHLORIDE 0.1 MG: 0.1 TABLET ORAL at 10:39

## 2025-07-23 RX ADMIN — MAGNESIUM SULFATE HEPTAHYDRATE 4 G: 40 INJECTION, SOLUTION INTRAVENOUS at 10:37

## 2025-07-23 RX ADMIN — PANTOPRAZOLE SODIUM 40 MG: 40 INJECTION, POWDER, LYOPHILIZED, FOR SOLUTION INTRAVENOUS at 10:43

## 2025-07-23 RX ADMIN — ACETAMINOPHEN 650 MG: 325 TABLET ORAL at 19:42

## 2025-07-23 RX ADMIN — Medication 1 TABLET: at 10:39

## 2025-07-23 ASSESSMENT — ACTIVITIES OF DAILY LIVING (ADL)
ADLS_ACUITY_SCORE: 38
ADLS_ACUITY_SCORE: 39
ADLS_ACUITY_SCORE: 39
ADLS_ACUITY_SCORE: 38
ADLS_ACUITY_SCORE: 39
DEPENDENT_IADLS:: INDEPENDENT
ADLS_ACUITY_SCORE: 39
ADLS_ACUITY_SCORE: 38
ADLS_ACUITY_SCORE: 39

## 2025-07-23 NOTE — PROGRESS NOTES
07/23/25 1425   Appointment Info   Signing Clinician's Name / Credentials (PT) Elaine Dozier DPT   Living Environment   People in Home spouse   Current Living Arrangements house   Home Accessibility stairs to enter home;stairs within home   Number of Stairs, Main Entrance 2   Number of Stairs, Within Home, Primary eight   Stair Railings, Within Home, Primary railings safe and in good condition   Transportation Anticipated family or friend will provide   Living Environment Comments Pt reports a few LILLIA[place] then split level house. Walk in shower.   Self-Care   Usual Activity Tolerance good   Current Activity Tolerance moderate   Regular Exercise No   Equipment Currently Used at Home none   Fall history within last six months no   Activity/Exercise/Self-Care Comment IND at baseline, no AD.   General Information   Onset of Illness/Injury or Date of Surgery 07/20/25   Referring Physician Morenita Gusman MD   Patient/Family Therapy Goals Statement (PT) return home   Pertinent History of Current Problem (include personal factors and/or comorbidities that impact the POC) Carlos A Lopez is a 49 year old male with a history of htn, hx of colon ca s/p right hemicolectomy followed by chemo, alcohol abuse and dependence admitted on 7/20/2025 with visual hallucinations   Existing Precautions/Restrictions fall   Cognition   Affect/Mental Status (Cognition) WFL   Follows Commands (Cognition) WFL   Cognitive Status Comments impulsive   Pain Assessment   Patient Currently in Pain No   Integumentary/Edema   Integumentary/Edema Comments jaundice, see nursing notes   Posture    Posture Forward head position   Range of Motion (ROM)   Range of Motion ROM is WFL   Strength (Manual Muscle Testing)   Strength (Manual Muscle Testing) Deficits observed during functional mobility   Bed Mobility   Comment, (Bed Mobility) supine<>sit SBA   Transfers   Comment, (Transfers) sit<>stand with FWW and CGA   Gait/Stairs (Locomotion)   Comment,  (Gait/Stairs) amb with FWW and CGA   Balance   Balance Comments impaired; needing FWW and CGA   Clinical Impression   Criteria for Skilled Therapeutic Intervention Yes, treatment indicated   PT Diagnosis (PT) impaired functional mobility   Influenced by the following impairments weakness, impaired balance, deconditioning   Functional limitations due to impairments difficulty with bed mobility, transfers, ambulation, stairs   Clinical Presentation (PT Evaluation Complexity) stable   Clinical Presentation Rationale clinical judgement   Clinical Decision Making (Complexity) low complexity   Planned Therapy Interventions (PT) balance training;bed mobility training;gait training;home exercise program;neuromuscular re-education;patient/family education;ROM (range of motion);stair training;strengthening;transfer training;progressive activity/exercise;risk factor education;home program guidelines   Risk & Benefits of therapy have been explained evaluation/treatment results reviewed;care plan/treatment goals reviewed;risks/benefits reviewed;current/potential barriers reviewed;participants voiced agreement with care plan;participants included;patient   PT Total Evaluation Time   PT Eval, Low Complexity Minutes (90388) 10   Physical Therapy Goals   PT Frequency Daily   PT Predicted Duration/Target Date for Goal Attainment 07/26/25   PT Goals Bed Mobility;Transfers;Gait;Stairs   PT: Bed Mobility Independent;Supine to/from sit   PT: Transfers Modified independent;Sit to/from stand;Assistive device   PT: Gait Modified independent;Assistive device;Greater than 200 feet   PT: Stairs Supervision/stand-by assist;8 stairs;Rail on left   PT Discharge Planning   PT Plan PT: balance challenges, bring SPC and trial vs no AD vs FWW, review stairs single rail   PT Discharge Recommendation (DC Rec) home with assist;home with outpatient physical therapy   PT Rationale for DC Rec Patient is below baseline functional mobility. Pt lives with  spouse, is IND at baseline. CUrrently pt needing A x 1 for safety with mobility. Pt limited by impaired balance, deconditioning, weakness, and is a high falls risk. Anticipate with continued skilled IP PT and medical clearance, pt will be safe for d/c home with assist and OP PT to progress balance and IND mobility.   PT Brief overview of current status A x 1 FWW   PT Total Distance Amb During Session (feet) 400   Physical Therapy Time and Intention   Total Session Time (sum of timed and untimed services) 10

## 2025-07-23 NOTE — PLAN OF CARE
"Goal Outcome Evaluation:      Plan of Care Reviewed With: patient    Overall Patient Progress: improvingOverall Patient Progress: improving    Outcome Evaluation: scheduled gabapentina nd clonidine given. CIWA 2, 2 and 1  To Do:  End of Shift Summary  For vital signs and complete assessments, please see documentation flowsheets.     Pertinent assessments: Assumed cares @ 2097-9299. Pt A&OX4. Vss and on RA. Ls clear and no sob noted. Tolerating diet and no nausea reported. Denied pain. Impulsive at times during the shift and redirected. Ciwa 2, and 2 and 1. Lactulose and scheduled clonidine and gabapentin given.    Major Shift Events: No BM reported on this shift. Voiding ok without issues.    Treatment Plan: Ciwa, IVF, symptom management.    Bedside Nurse: Parth Logan RN       Problem: Adult Inpatient Plan of Care  Goal: Plan of Care Review  Description: The Plan of Care Review/Shift note should be completed every shift.  The Outcome Evaluation is a brief statement about your assessment that the patient is improving, declining, or no change.  This information will be displayed automatically on your shift  note.  Outcome: Progressing  Flowsheets (Taken 7/23/2025 2167)  Outcome Evaluation: scheduled gabapentina nd clonidine given. CIWA 2, 2 and 1  Plan of Care Reviewed With: patient  Overall Patient Progress: improving  Goal: Patient-Specific Goal (Individualized)  Description: You can add care plan individualizations to a care plan. Examples of Individualization might be:  \"Parent requests to be called daily at 9am for status\", \"I have a hard time hearing out of my right ear\", or \"Do not touch me to wake me up as it startles  me\".  Outcome: Progressing  Goal: Absence of Hospital-Acquired Illness or Injury  Outcome: Progressing  Intervention: Identify and Manage Fall Risk  Recent Flowsheet Documentation  Taken 7/22/2025 5891 by Parth Logan, RN  Safety Promotion/Fall Prevention:   activity supervised   " assistive device/personal items within reach   clutter free environment maintained   nonskid shoes/slippers when out of bed   patient and family education   room near nurse's station   room organization consistent   safety round/check completed  Intervention: Prevent Skin Injury  Recent Flowsheet Documentation  Taken 7/22/2025 2342 by Parth Logan RN  Body Position: position changed independently  Skin Protection: adhesive use limited  Taken 7/22/2025 2125 by Patrh Logan RN  Body Position: position changed independently  Intervention: Prevent and Manage VTE (Venous Thromboembolism) Risk  Recent Flowsheet Documentation  Taken 7/22/2025 2342 by Parth Logan RN  VTE Prevention/Management: SCDs off (sequential compression devices)  Intervention: Prevent Infection  Recent Flowsheet Documentation  Taken 7/22/2025 2342 by Parth Logan RN  Infection Prevention:   cohorting utilized   hand hygiene promoted   personal protective equipment utilized   single patient room provided  Goal: Optimal Comfort and Wellbeing  Outcome: Progressing  Goal: Readiness for Transition of Care  Outcome: Progressing     Problem: Fall Injury Risk  Goal: Absence of Fall and Fall-Related Injury  Outcome: Progressing  Intervention: Identify and Manage Contributors  Recent Flowsheet Documentation  Taken 7/22/2025 2342 by Parth Logan RN  Medication Review/Management: medications reviewed  Intervention: Promote Injury-Free Environment  Recent Flowsheet Documentation  Taken 7/22/2025 2342 by Parth Logan RN  Safety Promotion/Fall Prevention:   activity supervised   assistive device/personal items within reach   clutter free environment maintained   nonskid shoes/slippers when out of bed   patient and family education   room near nurse's station   room organization consistent   safety round/check completed     Problem: Alcohol Withdrawal  Goal: Alcohol Withdrawal Symptom Control  Outcome: Progressing  Goal: Optimal  Neurologic Function  Outcome: Progressing  Goal: Readiness for Change Identified  Outcome: Progressing

## 2025-07-23 NOTE — PROGRESS NOTES
Attempted to call patient to discuss possible TONI treatment options and to possibly complete an assessment. Phone rang 15+ times and no answer, will continue to attempt to reach patient.       BRIDGETTE Ballard on 7/23/2025 at 10:05 AM    Met with patient to discuss possible TONI treatment options and to possibly complete an assessment. Patient sounded very groggy and asked if staff could call him back tomorrow.  Will continue to follow.      BRIDGETTE Ballard on 7/23/2025 at 12:45 PM      .

## 2025-07-23 NOTE — PROGRESS NOTES
[facility]  Hospitalist Progress Note  Morenita Gusman MD 07/23/2025    Reason for Stay (Diagnosis): decompensated new cirrhotic          Assessment and Plan:      Summary of Stay: Carlos A Lopez is a 49 year old male with a history of htn, hx of colon ca s/p right hemicolectomy followed by chemo, alcohol abuse and dependence admitted on 7/20/2025 with visual hallucinations     Here in the ED 2 days pta and noted to have a bili 5 and CT CAP with e/o cirrhosis with portal htn.  Incidentally noted to have cholelithiasis and GBW thickening without e/o cholecystitis. Spouse noted sclera icterus about a week ago. BAL at the visit 0.23 but denied significant drinking.  Discharged home with prn diazepam.  He did not have any alcohol and then started having visual hallucinations that seem real to him.He described seeing multiple women performing acrobatics including a trapeze in his home before coming in and then a man came and sat down by him which he thought was weird.     He also reports unintentional weight loss of 40 # over the last 6 months     VS with hypertension/tachycardia/and mildly elevated temp  CMP with hyponatremia 126, K 3.2, Mag, 1.0, phos 2.3.  LFTs  AST//24, bili 6.1.  INR  2.0  CBC macrocytic anemia 9.9, platelets low at 66    Problem List:   Visual hallucinations  Seem most consistent with alcohol withdrawal although HE could be contributing.   resolved    Alcohol withdrawal, dependence  Improved with alcohol withdrawal protocol  -remains on gabapentin taper but is a bit off balance and mildly lethargic, decreased dosing and will cont to taper and will stop clonidine   -continue with CIWA driven  benzo's and hasn't needed any benzo;s for 2 days   -we discussed that his liver will eventually fail if he doesn't stop drinking and he understands this and he's interested in sobriety   -chem dep eval    Cirrhosis,new diagnosis with decompensation   Splenomegaly  Appreciate GI weighing in.   "Considering steroids - will start tomorrow if MELD worsens.  Hep serologies consistent with recovery from Hep B infection.  Almost certainly all due to alcohol.  He's motivated to stop drinking     Multiple metabolic derangements  Prolonged QTc  Hyponatremia 2nd etoh and improving appropriately - stopped IVF  Hypomag and hypoK replaced and rechecked  QTc improving but remains prolonged at 483    Macrocytic anemia and thrombocytopenia   2* alcohol   No indication for transfusion     Ataxic  Suspect MF, deconditioning/wtloss, medications, potential cerebellar effect of alcohol     Htn  Was not on any medications pta  -monitor, likely withdrawal and stress    Incidental findings  New right upper lobe groundglass nodule. Adenocarcinoma not excluded. If more recent comparison images are unavailable, six-month follow-up recommended per Fleischner Society guidelines   -I have not discussed this with the patient     DVT Prophylaxis: PCDs  Code Status: Full Code  Functional Status: typically independent  Diet: reg  Evans: not needed  Access : PIV      Medically Ready for Discharge: Anticipated in 2-4 Days     Securely message with [employer] (more info)  Text page via [employer] Paging/Directory       I spent 45 minutes reviewing epic including prior labs/imaging/medical history and notes related to this encounter  In addition time was spent in interveiwing the patient, communicating with contacts, and medical decision making      Interval History (Subjective):      Both wife and RN note that he is off balance otherwise states he is feeling fine                   Physical Exam:      Last Vital Signs:  /67 (BP Location: Left arm)   Pulse 88   Temp 97.6  F (36.4  C) (Axillary)   Resp 16   Ht 1.778 m (5' 10\")   Wt 79 kg (174 lb 2.6 oz)   SpO2 98%   BMI 24.99 kg/m      I/O:  Pleasant jaundiced nad looks stated age head nc/at sclera icteric lungs ctab nl effort rrr no mrg no le edema belly rounded mild firmness but denies " tenderness. Skin warm and dry no cyanosis or clubbing affect appropriate alert and answers questions appropriately           Medications:      All current medications were reviewed with changes reflected in problem list.         Data:      All new lab and imaging data was reviewed.   Labs:  Recent Labs   Lab 07/23/25  1631 07/23/25 0636   NA  --  130*   POTASSIUM 3.7 3.2*   CHLORIDE  --  95*   CO2  --  23   ANIONGAP  --  12   GLC  --  97   BUN  --  7.3   CR  --  0.66*   GFRESTIMATED  --  >90   RACIEL  --  8.7*     Recent Labs   Lab 07/23/25 0636   WBC 7.0   HGB 9.5*   HCT 27.0*   *   PLT 84*     Recent Labs   Lab 07/23/25 0636 07/22/25 0736 07/21/25  0706 07/20/25 2330 07/18/25 1953   GLC 97 89 105* 108* 96     Recent Labs   Lab 07/23/25 0636 07/22/25 0736 07/21/25 0706 07/20/25 2330 07/18/25 1953   * 122* 150* 176* 200*   ALT 21 20 24 28 27   ALKPHOS 111 107 126 149 161*   BILITOTAL 5.8* 6.0* 6.1* 7.1* 5.1*   DILLON  --   --  54 61* 32      Imaging:   Results for orders placed or performed during the hospital encounter of 07/18/25   CT Chest/Abdomen/Pelvis w Contrast    Narrative    EXAM: CT CHEST/ABDOMEN/PELVIS W CONTRAST  LOCATION: [facility]  DATE: 7/18/2025    INDICATION: 40 pound unintended weight loss, jaundice, near syncope, suspicion for underlying malignancy  COMPARISON: 2/2/2021  TECHNIQUE: CT scan of the chest, abdomen, and pelvis was performed following injection of IV contrast. Multiplanar reformats were obtained. Dose reduction techniques were used.   CONTRAST: 88mL Isovue 370    FINDINGS:   LUNGS AND PLEURA: 13 mm right upper lobe ground glass nodule, new in the interval, image 64. Right middle lobe bronchiectasis, increased in interval. Calcified right lower lobe granuloma.    MEDIASTINUM/AXILLAE: No significant mediastinal or hilar adenopathy. Normal heart size.    CORONARY ARTERY CALCIFICATION: Severe.    HEPATOBILIARY: Hepatomegaly. New diffuse hepatic  nodularity compatible with cirrhosis. Gallbladder is moderately distended and mildly thick-walled with numerous stones. No ductal dilatation. Recanalized paraumbilical vein.    PANCREAS: Unremarkable    SPLEEN: Spleen has increased in size now measuring 13.9 cm in AP dimension.    ADRENAL GLANDS: Unremarkable    KIDNEYS/BLADDER: No hydronephrosis. Chronic bladder wall thickening, likely related to chronic outlet obstruction.    BOWEL: No small bowel obstruction. Anastomotic suture line in the right midabdomen status post partial colectomy. Uncomplicated colonic diverticula.    LYMPH NODES: Scattered subcentimeter reactive upper abdominal lymph nodes. Nonspecific stranding in the retroperitoneum, greatest in the left upper quadrant, image 167, series 3.    VASCULATURE: Moderate atherosclerotic disease without abdominal aortic aneurysm. Patent portal vein. Recanalized paraumbilical vein, with prominent anterior abdominal collaterals. Increasing perirectal collaterals.    PELVIC ORGANS: Mild prostatic hypertrophy. No free fluid.    MUSCULOSKELETAL: No acute bony abnormalities      Impression    IMPRESSION:  1.  Morphologic changes of cirrhosis and portal hypertension.  2.  Distended thick-walled gallbladder with dependent stones. Correlate clinically to exclude cholecystitis and consider ultrasound if warranted.  3.  New right upper lobe groundglass nodule. Adenocarcinoma not excluded. If more recent comparison images are unavailable, six-month follow-up recommended per Fleischner Society guidelines.  4.  Nonspecific left upper quadrant retroperitoneal stranding. Attention at follow-up advised.   US Abdomen Limited    Narrative    EXAM: US ABDOMEN LIMITED  LOCATION: [facility]  DATE: 7/18/2025    INDICATION: Jaundice, weight loss, abnormal CT, eval cholecysitis or choledocholithiasis.  COMPARISON: CT chest, abdomen and pelvis on 7/18/2025.  TECHNIQUE: Limited abdominal  ultrasound.    FINDINGS:    GALLBLADDER: Cholelithiasis and borderline gallbladder wall thickening measuring 3 mm. No significant pericholecystic fluid. Sonographic Dennis's sign is negative.    BILE DUCTS: No biliary dilatation. The common duct measures 2 mm.    LIVER: Demonstrates increased hepatic parenchymal parenchymal echogenicity and mild surface nodularity. No focal mass. The portal vein is patent with flow in the normal direction.    RIGHT KIDNEY: No hydronephrosis.    PANCREAS: The visualized portions are normal.    No ascites.      Impression    IMPRESSION:  1.  Cholelithiasis and borderline gallbladder wall thickening. No other sonographic evidence of acute cholecystitis.  2.  Hepatic steatosis and mild surface nodularity of the liver, suggesting chronic parenchymal liver disease, including hepatic steatosis with or without fibrosis. No focal hepatic masses visualized.

## 2025-07-23 NOTE — CONSULTS
Care Management Initial Consult    General Information  Assessment completed with: Patient, Spouse or significant other, Alexis  Type of CM/SW Visit: Initial Assessment    Primary Care Provider verified and updated as needed:  (Patient does not have one, but is working on getting a new one.)   Readmission within the last 30 days:        Reason for Consult: discharge planning, substance use concerns  Advance Care Planning: Advance Care Planning Reviewed: present on chart          Communication Assessment  Patient's communication style: spoken language (English or Bilingual)    Hearing Difficulty or Deaf: no   Wear Glasses or Blind: yes    Cognitive  Cognitive/Neuro/Behavioral: WDL  Level of Consciousness: alert  Arousal Level: opens eyes spontaneously  Orientation: oriented x 4  Mood/Behavior: calm  Best Language: 0 - No aphasia  Speech: clear    Living Environment:   People in home: spouse  Nelda  Current living Arrangements: house, stairs to ambulate  Able to return to prior arrangements: yes       Family/Social Support:  Care provided by: self  Provides care for: no one  Marital Status:   Support system: Wife  Nelda       Description of Support System: Involved, Supportive    Support Assessment: Adequate family and caregiver support, Adequate social supports    Current Resources:   Patient receiving home care services: No        Community Resources: None  Equipment currently used at home: none  Supplies currently used at home: None    Employment/Financial:  Employment Status: employed full-time        Financial Concerns: none   Referral to Financial Worker: No       Does the patient's insurance plan have a 3 day qualifying hospital stay waiver?  No    Lifestyle & Psychosocial Needs:  Social Drivers of Health     Food Insecurity: Low Risk  (7/21/2025)    Food Insecurity     Within the past 12 months, did you worry that your food would run out before you got money to buy more?: No     Within the past  12 months, did the food you bought just not last and you didn t have money to get more?: No   Depression: Not at risk (5/19/2022)    Received from [employer]    PHQ-2     PHQ-2 Score: 0   Housing Stability: Low Risk  (7/21/2025)    Housing Stability     Do you have housing? : Yes     Are you worried about losing your housing?: No   Tobacco Use: Medium Risk (7/18/2025)    Received from [employer]    Patient History     Smoking Tobacco Use: Former     Smokeless Tobacco Use: Former     Passive Exposure: Not on file   Financial Resource Strain: Low Risk  (7/21/2025)    Financial Resource Strain     Within the past 12 months, have you or your family members you live with been unable to get utilities (heat, electricity) when it was really needed?: No   Alcohol Use: Not on file   Transportation Needs: Low Risk  (7/21/2025)    Transportation Needs     Within the past 12 months, has lack of transportation kept you from medical appointments, getting your medicines, non-medical meetings or appointments, work, or from getting things that you need?: No   Physical Activity: Not on file   Interpersonal Safety: Low Risk  (7/21/2025)    Interpersonal Safety     Do you feel physically and emotionally safe where you currently live?: Yes     Within the past 12 months, have you been hit, slapped, kicked or otherwise physically hurt by someone?: No     Within the past 12 months, have you been humiliated or emotionally abused in other ways by your partner or ex-partner?: No   Stress: Not on file   Social Connections: Not on file   Health Literacy: Not on file       Functional Status:  Prior to admission patient needed assistance:   Dependent ADLs:: Independent  Dependent IADLs:: Independent       Mental Health Status:  Mental Health Status: No Current Concerns       Chemical Dependency Status:  Chemical Dependency Status: Current Concern             Values/Beliefs:  Spiritual, Cultural Beliefs, Sikh Practices, Values that  affect care:            Values/Beliefs Comment: Undesignated    Discussed  Partnership in Safe Discharge Planning  document with patient/family: No      Care Management Discharge Note    Discharge Date: 07/24/2025       Discharge Disposition: Home    Discharge Services: OP PT    Discharge DME: None    Discharge Transportation: family or friend will provide    Private pay costs discussed: Not applicable    Does the patient's insurance plan have a 3 day qualifying hospital stay waiver?  No    PAS Confirmation Code: N/A  Patient/family educated on Medicare website which has current facility and service quality ratings:  (N/A)    Education Provided on the Discharge Plan: Yes  Persons Notified of Discharge Plans: Pt, Pt's wife  Patient/Family in Agreement with the Plan: yes    Handoff Referral Completed: No, handoff not indicated or clinically appropriate    Additional Information:  Sw was consulted due to the pt having an unplanned readmission score of 21% and for chemical health issues. Sw is not addressing CD resources since CD is consulted.  Sw met with the pt who was up in his chair when Sw arrived. Pt's wife Nelda was present.  Pt was in a pleasant mood and contributed appropriately to the conversation. He stated that he is independent at baseline and works full-time. Sw reminded him that CD will follow up with him tomorrow to discuss resources. Pt remains agreeable to this. Pt currently does not have a PCP. He did have one, but they left. He and Nelda said that after he discharges, they are going to work on establishing him with a PCP.  Nelda will provide transport home for the pt at time of discharge. Neither Nelda or the pt identified any discharge needs. Sw addressed their questions and concerns.    No further care management intervention anticipated at this time.  Please re-consult if further needs arise.  Care management signing off.      SAMANTHA Almodovar, [facility]  Inpatient Care Coordination  [facility]  [facility]  [phone]

## 2025-07-23 NOTE — PROGRESS NOTES
"GASTROENTEROLOGY PROGRESS NOTE     SUBJECTIVE:  Laying in bed with wife at bedside. Groggy and fatigued. No hematochezia or melena. No abdominal pain.      OBJECTIVE:  /59 (BP Location: Left arm)   Pulse 90   Temp 98.2  F (36.8  C) (Oral)   Resp 16   Ht 1.778 m (5' 10\")   Wt 79 kg (174 lb 2.6 oz)   SpO2 96%   BMI 24.99 kg/m    Temp (24hrs), Av  F (36.7  C), Min:97.6  F (36.4  C), Max:98.2  F (36.8  C)    Patient Vitals for the past 72 hrs:   Weight   25 2304 79 kg (174 lb 2.6 oz)       Intake/Output Summary (Last 24 hours) at 2025 1158  Last data filed at 2025 1700  Gross per 24 hour   Intake 960 ml   Output --   Net 960 ml        PHYSICAL EXAM  GENERAL: No obvious distress  EYES: Scleral icterus  ABDOMEN: Mildly distended. Soft. Non tender.   SKIN: Jaundiced  NEUROLOGIC: Alert and oriented x3  PSYCHIATRIC: Somewhat confused     Additional Comments:  ROS, FH, SH: See initial GI consult for details.     I have reviewed the patient's new clinical lab results:     Recent Labs   Lab Test 25  0625  0736 25  1822 25  0706 25  2330 25  1953   WBC 7.0 7.3  --  7.2 7.8 7.9   HGB 9.5* 9.4* 9.6* 9.9* 11.3* 11.3*   * 109* 108* 106* 104* 104*   PLT 84* 71*  --  66* 67* 77*   INR  --  2.09*  --   --  2.00* 1.51*     Recent Labs   Lab Test 25  0636 25  0736 25  1400 25  0706   POTASSIUM 3.2* 3.5 3.5 3.0*   CHLORIDE 95* 98  --  95*   CO2 23 22  --  27   BUN 7.3 7.1  --  7.1   ANIONGAP 12 13  --  10     Recent Labs   Lab Test 25  0636 25  0736 25  0706 25  0023 25  2330 25  2131 25  1953 24  1831 24  0858   ALBUMIN 3.2* 3.1* 3.4*  --  3.8  --  3.9   < >  --    BILITOTAL 5.8* 6.0* 6.1*  --  7.1*  --  5.1*   < >  --    ALT   --  28  --  27   < >  --    * 122* 150*  --  176*  --  200*   < >  --    PROTEIN  --   --   --  Negative  --  Negative  --   --  50*   LIPASE  " --   --   --   --  120*  --  80*  --   --     < > = values in this interval not displayed.     MELD 3.0: 24 at 7/23/2025  6:36 AM  MELD-Na: 25 at 7/23/2025  6:36 AM  Calculated from:  Serum Creatinine: 0.66 mg/dL (Using min of 1 mg/dL) at 7/23/2025  6:36 AM  Serum Sodium: 130 mmol/L at 7/23/2025  6:36 AM  Total Bilirubin: 5.8 mg/dL at 7/23/2025  6:36 AM  Serum Albumin: 3.2 g/dL at 7/23/2025  6:36 AM  INR(ratio): 2.09 at 7/22/2025  7:36 AM  Age at listing (hypothetical): 49 years  Sex: Male at 7/23/2025  6:36 AM    MDF = 44.98    IMAGING:   US ABDOMEN LIMITED 7/18/2025  IMPRESSION:  1.  Cholelithiasis and borderline gallbladder wall thickening. No other sonographic evidence of acute cholecystitis.  2.  Hepatic steatosis and mild surface nodularity of the liver, suggesting chronic parenchymal liver disease, including hepatic steatosis with or without fibrosis. No focal hepatic masses visualized.     CT CHEST ABDOMEN PELVIS W CONTRAST 7/18/2025  IMPRESSION:  1.  Morphologic changes of cirrhosis and portal hypertension.  2.  Distended thick-walled gallbladder with dependent stones. Correlate clinically to exclude cholecystitis and consider ultrasound if warranted.  3.  New right upper lobe groundglass nodule. Adenocarcinoma not excluded. If more recent comparison images are unavailable, six-month follow-up recommended per Fleischner Society guidelines.  4.  Nonspecific left upper quadrant retroperitoneal stranding. Attention at follow-up advised.     ASSESSMENT:     Acute alcohol hepatitis. Patient has MELD score of 24 and MDF of 41.5. Will monitor and consider glucocorticoids in next few days if labs remain elevated. If MELD worsens tomorrow, will consider starting steroids tomorrow.    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 CT with morphologic changes of cirrhosis and portal hypertension. Suspect alcohol as cause but checked viral hepatitis serologies.  Hepatitis B serologies consistent with recovery from prior hep B infection.    Confusion. Possible hepatic encephalopathy vs alcohol withdrawal. Ammonia mildly elevated at 61 on admission. Patient received one dose of lactulose. Confusion is improved today.   Anemia. Hgb 11.3 on arrival. Hgb has remained stable around 9.5. No overt bleeding. Patient at risk for alcohol gastritis or portal hypertensive gastropathy in setting of cirrhosis. Will consider EGD pending clinical course, however no plans for this currently.     PLAN:  - Obtain MELD labs (creatinine, bilirubin, INR, sodium, albumin) tomorrow morning  - Alcohol withdrawal protocol per primary   - Thiamine and folic acid  - Monitor hgb, transfuse prn   - IV PPI BID  - Fluid and electrolyte management per primary   - Alcohol cessation  - Outpatient follow-up with Hepatology. [facility] will contact patient to arrange.     Discussed patient findings and plan with Dr. Woodward.     Total time: 25 minutes of total time was spent providing patient care, including patient evaluation, reviewing documentation/test results, and .     Helen Arshad PA-C  [facility] ([facility])

## 2025-07-24 ENCOUNTER — APPOINTMENT (OUTPATIENT)
Dept: PHYSICAL THERAPY | Facility: CLINIC | Age: 50
End: 2025-07-24
Payer: COMMERCIAL

## 2025-07-24 ENCOUNTER — APPOINTMENT (OUTPATIENT)
Dept: ULTRASOUND IMAGING | Facility: CLINIC | Age: 50
End: 2025-07-24
Attending: SURGERY
Payer: COMMERCIAL

## 2025-07-24 VITALS
TEMPERATURE: 97.5 F | SYSTOLIC BLOOD PRESSURE: 136 MMHG | HEIGHT: 70 IN | OXYGEN SATURATION: 95 % | WEIGHT: 174.16 LBS | BODY MASS INDEX: 24.93 KG/M2 | HEART RATE: 69 BPM | DIASTOLIC BLOOD PRESSURE: 79 MMHG | RESPIRATION RATE: 16 BRPM

## 2025-07-24 LAB
ALBUMIN SERPL BCG-MCNC: 3.1 G/DL (ref 3.5–5.2)
ALBUMIN UR-MCNC: NEGATIVE MG/DL
ALP SERPL-CCNC: 105 U/L (ref 40–150)
ALT SERPL W P-5'-P-CCNC: 22 U/L (ref 0–70)
ANION GAP SERPL CALCULATED.3IONS-SCNC: 11 MMOL/L (ref 7–15)
APPEARANCE UR: CLEAR
AST SERPL W P-5'-P-CCNC: 109 U/L (ref 0–45)
BILIRUB SERPL-MCNC: 6.9 MG/DL
BILIRUB UR QL STRIP: ABNORMAL
BUN SERPL-MCNC: 8.8 MG/DL (ref 6–20)
CALCIUM SERPL-MCNC: 9.5 MG/DL (ref 8.8–10.4)
CHLORIDE SERPL-SCNC: 98 MMOL/L (ref 98–107)
COLOR UR AUTO: ABNORMAL
CREAT SERPL-MCNC: 0.73 MG/DL (ref 0.67–1.17)
EGFRCR SERPLBLD CKD-EPI 2021: >90 ML/MIN/1.73M2
ERYTHROCYTE [DISTWIDTH] IN BLOOD BY AUTOMATED COUNT: 14.7 % (ref 10–15)
FLUAV RNA SPEC QL NAA+PROBE: NEGATIVE
FLUBV RNA RESP QL NAA+PROBE: NEGATIVE
GLUCOSE SERPL-MCNC: 107 MG/DL (ref 70–99)
GLUCOSE UR STRIP-MCNC: NEGATIVE MG/DL
HCO3 SERPL-SCNC: 25 MMOL/L (ref 22–29)
HCT VFR BLD AUTO: 28.2 % (ref 40–53)
HGB BLD-MCNC: 9.8 G/DL (ref 13.3–17.7)
HGB UR QL STRIP: NEGATIVE
INR PPP: 2.35 (ref 0.85–1.15)
KETONES UR STRIP-MCNC: NEGATIVE MG/DL
LEUKOCYTE ESTERASE UR QL STRIP: NEGATIVE
MAGNESIUM SERPL-MCNC: 1.7 MG/DL (ref 1.7–2.3)
MCH RBC QN AUTO: 37.8 PG (ref 26.5–33)
MCHC RBC AUTO-ENTMCNC: 34.8 G/DL (ref 31.5–36.5)
MCV RBC AUTO: 109 FL (ref 78–100)
NITRATE UR QL: NEGATIVE
PH UR STRIP: 6.5 [PH] (ref 5–7)
PHOSPHATE SERPL-MCNC: 3.9 MG/DL (ref 2.5–4.5)
PLATELET # BLD AUTO: 98 10E3/UL (ref 150–450)
POTASSIUM SERPL-SCNC: 3.9 MMOL/L (ref 3.4–5.3)
PROT SERPL-MCNC: 7.3 G/DL (ref 6.4–8.3)
PROTHROMBIN TIME: 25.4 SECONDS (ref 11.8–14.8)
RBC # BLD AUTO: 2.59 10E6/UL (ref 4.4–5.9)
RBC URINE: 2 /HPF
RSV RNA SPEC NAA+PROBE: NEGATIVE
SARS-COV-2 RNA RESP QL NAA+PROBE: NEGATIVE
SODIUM SERPL-SCNC: 134 MMOL/L (ref 135–145)
SP GR UR STRIP: 1.02 (ref 1–1.03)
SQUAMOUS EPITHELIAL: <1 /HPF
UROBILINOGEN UR STRIP-MCNC: NORMAL MG/DL
WBC # BLD AUTO: 6.4 10E3/UL (ref 4–11)
WBC URINE: 1 /HPF

## 2025-07-24 PROCEDURE — 97112 NEUROMUSCULAR REEDUCATION: CPT | Mod: GP

## 2025-07-24 PROCEDURE — 36415 COLL VENOUS BLD VENIPUNCTURE: CPT | Performed by: HOSPITALIST

## 2025-07-24 PROCEDURE — 250N000013 HC RX MED GY IP 250 OP 250 PS 637: Performed by: INTERNAL MEDICINE

## 2025-07-24 PROCEDURE — 85027 COMPLETE CBC AUTOMATED: CPT | Performed by: INTERNAL MEDICINE

## 2025-07-24 PROCEDURE — 250N000011 HC RX IP 250 OP 636: Performed by: HOSPITALIST

## 2025-07-24 PROCEDURE — 84100 ASSAY OF PHOSPHORUS: CPT | Performed by: INTERNAL MEDICINE

## 2025-07-24 PROCEDURE — 76705 ECHO EXAM OF ABDOMEN: CPT

## 2025-07-24 PROCEDURE — 99233 SBSQ HOSP IP/OBS HIGH 50: CPT | Performed by: HOSPITALIST

## 2025-07-24 PROCEDURE — 84155 ASSAY OF PROTEIN SERUM: CPT | Performed by: STUDENT IN AN ORGANIZED HEALTH CARE EDUCATION/TRAINING PROGRAM

## 2025-07-24 PROCEDURE — 99254 IP/OBS CNSLTJ NEW/EST MOD 60: CPT | Performed by: SURGERY

## 2025-07-24 PROCEDURE — 250N000011 HC RX IP 250 OP 636: Performed by: INTERNAL MEDICINE

## 2025-07-24 PROCEDURE — 250N000012 HC RX MED GY IP 250 OP 636 PS 637: Performed by: STUDENT IN AN ORGANIZED HEALTH CARE EDUCATION/TRAINING PROGRAM

## 2025-07-24 PROCEDURE — 250N000013 HC RX MED GY IP 250 OP 250 PS 637: Performed by: HOSPITALIST

## 2025-07-24 PROCEDURE — 83735 ASSAY OF MAGNESIUM: CPT | Performed by: INTERNAL MEDICINE

## 2025-07-24 PROCEDURE — 36415 COLL VENOUS BLD VENIPUNCTURE: CPT | Performed by: STUDENT IN AN ORGANIZED HEALTH CARE EDUCATION/TRAINING PROGRAM

## 2025-07-24 PROCEDURE — 85610 PROTHROMBIN TIME: CPT | Performed by: STUDENT IN AN ORGANIZED HEALTH CARE EDUCATION/TRAINING PROGRAM

## 2025-07-24 PROCEDURE — 87637 SARSCOV2&INF A&B&RSV AMP PRB: CPT | Performed by: HOSPITALIST

## 2025-07-24 PROCEDURE — 81003 URINALYSIS AUTO W/O SCOPE: CPT | Performed by: HOSPITALIST

## 2025-07-24 PROCEDURE — 97116 GAIT TRAINING THERAPY: CPT | Mod: GP

## 2025-07-24 PROCEDURE — 87040 BLOOD CULTURE FOR BACTERIA: CPT | Performed by: HOSPITALIST

## 2025-07-24 PROCEDURE — 120N000001 HC R&B MED SURG/OB

## 2025-07-24 RX ORDER — PREDNISOLONE SODIUM PHOSPHATE 15 MG/5ML
40 SOLUTION ORAL DAILY
Status: DISCONTINUED | OUTPATIENT
Start: 2025-07-24 | End: 2025-07-26 | Stop reason: HOSPADM

## 2025-07-24 RX ORDER — PIPERACILLIN SODIUM, TAZOBACTAM SODIUM 3; .375 G/15ML; G/15ML
3.38 INJECTION, POWDER, LYOPHILIZED, FOR SOLUTION INTRAVENOUS EVERY 6 HOURS
Status: DISCONTINUED | OUTPATIENT
Start: 2025-07-24 | End: 2025-07-25

## 2025-07-24 RX ADMIN — THIAMINE HCL TAB 100 MG 100 MG: 100 TAB at 08:41

## 2025-07-24 RX ADMIN — PANTOPRAZOLE SODIUM 40 MG: 40 INJECTION, POWDER, LYOPHILIZED, FOR SOLUTION INTRAVENOUS at 20:49

## 2025-07-24 RX ADMIN — GABAPENTIN 600 MG: 600 TABLET, FILM COATED ORAL at 08:41

## 2025-07-24 RX ADMIN — PANTOPRAZOLE SODIUM 40 MG: 40 INJECTION, POWDER, LYOPHILIZED, FOR SOLUTION INTRAVENOUS at 10:45

## 2025-07-24 RX ADMIN — LACTULOSE 20 G: 20 SOLUTION ORAL at 20:49

## 2025-07-24 RX ADMIN — LACTULOSE 20 G: 20 SOLUTION ORAL at 08:41

## 2025-07-24 RX ADMIN — PREDNISOLONE SODIUM PHOSPHATE 40 MG: 15 SOLUTION ORAL at 12:35

## 2025-07-24 RX ADMIN — PIPERACILLIN AND TAZOBACTAM 3.38 G: 3; .375 INJECTION, POWDER, FOR SOLUTION INTRAVENOUS at 12:41

## 2025-07-24 RX ADMIN — FOLIC ACID 1 MG: 1 TABLET ORAL at 08:41

## 2025-07-24 RX ADMIN — GABAPENTIN 600 MG: 600 TABLET, FILM COATED ORAL at 20:49

## 2025-07-24 RX ADMIN — Medication 1 TABLET: at 08:41

## 2025-07-24 RX ADMIN — PIPERACILLIN AND TAZOBACTAM 3.38 G: 3; .375 INJECTION, POWDER, FOR SOLUTION INTRAVENOUS at 19:00

## 2025-07-24 ASSESSMENT — ACTIVITIES OF DAILY LIVING (ADL)
ADLS_ACUITY_SCORE: 38

## 2025-07-24 NOTE — CONSULTS
General Surgery Consultation    Carlos A Lopez MRN# [id number]   Age: 49 year old YOB: 1975     Date of Admission:  7/20/2025    Reason for consult:            Fever, possible cholecystitis       Requesting physician:            Christen Taylor MD                Assessment and Plan:   Assessment:   Carlos A Lopez is a 49 year old male with alcoholic hepatitis who has been hospitalized for the last 6 days with acute liver failure and alcohol withdrawal. He was febrile yesterday to 102.9 without obvious etiology. Initial imaging from 7/18 showed mild gallbladder wall thickening at 3 mm with gallstones, but no other signs of cholecystitis. Repeat U/s from today looks similar. HIDA scan was not performed today for some reason, but will hopefully be completed tomorrow.  I suspect the mild gallbladder wall thickening is due to adjacent liver inflammation rather than simultaneous cholecystitis.  He has no post-prandial abdominal pain and is not tender for me on exam or with sonographic compression.         Plan:   NPO at midnight for HIDA scan tomorrow.     If gallbladder fills, would not recommend cholecystectomy at this time. If gallbladder does not fill, we will need to decide between cholecystectomy vs. Cholecystostomy tube, both of which would be risky during acute liver failure with coagulopathy.      Yadira Ngo MD           Chief Complaint:   Fever   History is obtained from the patient.         History of Present Illness:   Carlos A Lopez is a 49 year old male who presented on 7/18 withjaundice and hallucinations. He was found to be in acute liver failure due to alcohol hepatitis. He has CT changes consistent with cirrhosis. Yesterday, he had a brief, but high, fever to 102.9.  He didn't feel ill at the time. He denies abdominal pain. He is currently eating dinner and denies nausea/bloating or pain with eating.           Past Medical History:    has a past medical history of Alcohol abuse,  Alcoholic liver disease, Asthma, Cholelithiasis, Colon cancer, Colonic adenoma, Hypertension, and Pulmonary nodule - right upper lobe.          Past Surgical History:     Past Surgical History:   Procedure Laterality Date    FINGER SURGERY Left     Third finger    IR CHEST PORT PLACEMENT > 5 YRS OF AGE  06/05/2015    Right hemicolectomy NOS      TONSILLECTOMY, ADENOIDECTOMY, COMBINED               Social History:     Social History     Tobacco Use    Smoking status: Not on file    Smokeless tobacco: Not on file   Substance Use Topics    Alcohol use: Not on file             Family History:   Family history reviewed and is not pertinent.         Allergies:   No Known Allergies          Medications:     Current Facility-Administered Medications   Medication Dose Route Frequency Provider Last Rate Last Admin    acetaminophen (TYLENOL) tablet 650 mg  650 mg Oral Q4H PRN Jordon Schaefer MD   650 mg at 07/23/25 1942    Or    acetaminophen (TYLENOL) Suppository 650 mg  650 mg Rectal Q4H PRN Jordon Schaefer MD        calcium carbonate (TUMS) chewable tablet 1,000 mg  1,000 mg Oral 4x Daily PRN Jordon Schaefer MD        diazepam (VALIUM) tablet 10 mg  10 mg Oral Q30 Min PRN Jordon Schaefer MD   10 mg at 07/23/25 1835    Or    diazepam (VALIUM) injection 5-10 mg  5-10 mg Intravenous Q30 Min PRN Jordon Schaefer MD   5 mg at 07/21/25 2227    flumazenil (ROMAZICON) injection 0.2 mg  0.2 mg Intravenous q1 min prn Jordon Schaefer MD        folic acid (FOLVITE) tablet 1 mg  1 mg Oral Daily Jordon Schaefer MD   1 mg at 07/24/25 0841    [START ON 7/27/2025] gabapentin (NEURONTIN) capsule 100 mg  100 mg Oral BID Morenita Gusman MD        [START ON 7/25/2025] gabapentin (NEURONTIN) capsule 300 mg  300 mg Oral BID Morenita Gusman MD        gabapentin (NEURONTIN) tablet 600 mg  600 mg Oral BID Morenita Gusman MD   600 mg at 07/24/25 0841    lactulose (CHRONULAC) solution 20 g  20 g Oral BID  Nader Salinas, DO   20 g at 07/24/25 0841    lidocaine (LMX4) cream   Topical Q1H PRN Jordon Schaefer MD        lidocaine 1 % 0.1-1 mL  0.1-1 mL Other Q1H PRN Jordon Schaefer MD        melatonin tablet 5 mg  5 mg Oral QPM PRN Jordon Schaefer MD        multivitamin w/minerals (THERA-VIT-M) tablet 1 tablet  1 tablet Oral Daily Jordon Schaefer MD   1 tablet at 07/24/25 0841    ondansetron (ZOFRAN ODT) ODT tab 4 mg  4 mg Oral Q6H PRN Jordon Schaefer MD        Or    ondansetron (ZOFRAN) injection 4 mg  4 mg Intravenous Q6H PRN Jordon Schaefer MD        pantoprazole (PROTONIX) injection 40 mg  40 mg Intravenous BID Jordon Schaefer MD   40 mg at 07/24/25 1045    piperacillin-tazobactam (ZOSYN) 3.375 g vial to attach to  mL bag  3.375 g Intravenous Q6H Christen Taylor MD   3.375 g at 07/24/25 1241    prednisoLONE (ORAPRED) 15 MG/5ML solution 40 mg  40 mg Oral Daily Helen Arshad PA-C   40 mg at 07/24/25 1235    prochlorperazine (COMPAZINE) injection 10 mg  10 mg Intravenous Q6H PRN Jordon Schaefer MD        Or    prochlorperazine (COMPAZINE) tablet 10 mg  10 mg Oral Q6H PRN Jordon Schaefer MD        senna-docusate (SENOKOT-S/PERICOLACE) 8.6-50 MG per tablet 1 tablet  1 tablet Oral BID PRN Jordon Schaefer MD        Or    senna-docusate (SENOKOT-S/PERICOLACE) 8.6-50 MG per tablet 2 tablet  2 tablet Oral BID PRN Jordon Schaefer MD        sodium chloride (PF) 0.9% PF flush 3 mL  3 mL Intracatheter Q8H [facility] Jordon Schaefer MD   3 mL at 07/24/25 1626    sodium chloride (PF) 0.9% PF flush 3 mL  3 mL Intracatheter q1 min prn Jordon Schaefer MD        thiamine (B-1) tablet 100 mg  100 mg Oral Daily Jordon Schaefer MD   100 mg at 07/24/25 0841     Current Facility-Administered Medications   Medication Dose Route Frequency Provider Last Rate Last Admin    folic acid (FOLVITE) tablet 1 mg  1 mg Oral Daily Jordon Schaefer  "MD Rafal   1 mg at 07/24/25 0841    [START ON 7/27/2025] gabapentin (NEURONTIN) capsule 100 mg  100 mg Oral BID Morenita Gusman MD        [START ON 7/25/2025] gabapentin (NEURONTIN) capsule 300 mg  300 mg Oral BID Morenita Gusman MD        gabapentin (NEURONTIN) tablet 600 mg  600 mg Oral BID Morenita Gusman MD   600 mg at 07/24/25 0841    lactulose (CHRONULAC) solution 20 g  20 g Oral BID Nader Salinas,    20 g at 07/24/25 0841    multivitamin w/minerals (THERA-VIT-M) tablet 1 tablet  1 tablet Oral Daily Jordon Schaefer MD   1 tablet at 07/24/25 0841    pantoprazole (PROTONIX) injection 40 mg  40 mg Intravenous BID Jordon Schaefer MD   40 mg at 07/24/25 1045    piperacillin-tazobactam (ZOSYN) 3.375 g vial to attach to  mL bag  3.375 g Intravenous Q6H Christen Taylor MD   3.375 g at 07/24/25 1241    prednisoLONE (ORAPRED) 15 MG/5ML solution 40 mg  40 mg Oral Daily Helen Arshad PA-C   40 mg at 07/24/25 1235    sodium chloride (PF) 0.9% PF flush 3 mL  3 mL Intracatheter Q8H [facility] Jordon Schaefer MD   3 mL at 07/24/25 1626    thiamine (B-1) tablet 100 mg  100 mg Oral Daily Jordon Schaefer MD   100 mg at 07/24/25 0841            Review of Systems:   The 10 point review of systems is negative other than noted in the HPI.          Physical Exam:   /68 (BP Location: Left arm)   Pulse 81   Temp 98.2  F (36.8  C) (Oral)   Resp 12   Ht 1.778 m (5' 10\")   Wt 79 kg (174 lb 2.6 oz)   SpO2 96%   BMI 24.99 kg/m    General - Well developed male in no apparent distress  HEENT:  Head normocephalic and atraumatic, pupils equal and round, conjunctivae clear, +++ scleral icterus, mucous membranes moist, external ears and nose normal  Neck: Supple without thyromegaly or masses  Lymphatic: No cervical, or supraclavicular lymphadenopathy  Lungs: Clear to auscultation bilaterally  Heart: regular rate and rhythm, no murmurs  Abdomen:   soft, flat, non-distended with no tenderness " noted in the right upper quadrant . no masses palpated  Extremities: Warm without edema  Neurologic: nonfocal  Psychiatric: Mood and affect appropriate  Skin: Without lesions, rashes. +++ jaundice         Data:     WBC -   Lab Results   Component Value Date    WBC 6.4 07/24/2025       HgB -   Lab Results   Component Value Date    HGB 9.8 (L) 07/24/2025       Plt-   Lab Results   Component Value Date    PLT 98 (L) 07/24/2025       Liver Function Studies -   Recent Labs   Lab Test 07/24/25  0718   PROTTOTAL 7.3   ALBUMIN 3.1*   BILITOTAL 6.9*   ALKPHOS 105   *   ALT 22       Lipase-   Lab Results   Component Value Date    LIPASE 120 (H) 07/20/2025         Imaging:  All imaging studies reviewed by me.    Results for orders placed or performed during the hospital encounter of 07/20/25   US Abdomen Limited    Narrative    EXAM: US ABDOMEN LIMITED  LOCATION: [facility]  DATE: 7/24/2025    INDICATION: Fever, right upper quadrant tenderness  COMPARISON: CT and right upper quadrant ultrasound 7/18/2025  TECHNIQUE: Limited abdominal ultrasound.    FINDINGS:    GALLBLADDER: Gallstones in a nonpathologically distended gallbladder. Mild wall edema measuring up to 4 mm in thickness. No pericholecystic fluid. Negative sonographic Dennis's sign.    BILE DUCTS: No intrahepatic biliary dilatation. The common duct measures 3 mm.    LIVER: Nodular contour of liver with heterogeneous echotexture. The portal vein is patent with flow in the normal direction.    RIGHT KIDNEY: No hydronephrosis.    PANCREAS: The visualized portions are normal.    No ascites.      Impression    IMPRESSION:  1.  Cholelithiasis with some mild gallbladder wall edema but no additional evidence of acute cholecystitis or biliary obstruction.  2.  Morphologic changes of liver disease/cirrhosis. No ascites.           This note was created using voice recognition software. Undetected word substitutions or other errors may have occurred.      Time spent with the patient, reviewing the EMR, reviewing laboratory and imaging studies, more than 50% of which was counseling and coordinating care:  40 minutes.     Yadira Ngo MD

## 2025-07-24 NOTE — PROGRESS NOTES
"GASTROENTEROLOGY PROGRESS NOTE     SUBJECTIVE:  No acute concerns. Feels about the same as yesterday.      OBJECTIVE:  /66 (BP Location: Left arm)   Pulse 74   Temp 97.5  F (36.4  C) (Oral)   Resp 16   Ht 1.778 m (5' 10\")   Wt 79 kg (174 lb 2.6 oz)   SpO2 95%   BMI 24.99 kg/m    Temp (24hrs), Av.9  F (37.7  C), Min:97.5  F (36.4  C), Max:102.9  F (39.4  C)    No data found.  No intake or output data in the 24 hours ending 25 0910     PHYSICAL EXAM  GENERAL: No obvious distress  EYES: Scleral icterus  ABDOMEN: Non-distended. Positive bowel sounds. Soft. Non-tender.  SKIN: Jaundice  NEUROLOGIC: Alert and oriented. No asterixis.   PSYCHIATRIC: Normal affect     Additional Comments:  ROS, FH, SH: See initial GI consult for details.     I have reviewed the patient's new clinical lab results:     Recent Labs   Lab Test 2536 25  0736 25  0706 25  2330   WBC 6.4 7.0 7.3   < > 7.8   HGB 9.8* 9.5* 9.4*   < > 11.3*   * 108* 109*   < > 104*   PLT 98* 84* 71*   < > 67*   INR 2.35*  --  2.09*  --  2.00*    < > = values in this interval not displayed.     Recent Labs   Lab Test 25  1631 25  0636 25  0736   POTASSIUM 3.9 3.7 3.2* 3.5   CHLORIDE 98  --  95* 98   CO2    BUN 8.8  --  7.3 7.1   ANIONGAP 11  --  12 13     Recent Labs   Lab Test 2518 25  0636 25  0736 25  0706 25  0023 25  2330 25  2131 25  1953 24  1831 24  0858   ALBUMIN 3.1* 3.2* 3.1*   < >  --  3.8  --  3.9   < >  --    BILITOTAL 6.9* 5.8* 6.0*   < >  --  7.1*  --  5.1*   < >  --    ALT 22 21 20   < >  --  28  --  27   < >  --    * 102* 122*   < >  --  176*  --  200*   < >  --    PROTEIN  --   --   --   --  Negative  --  Negative  --   --  50*   LIPASE  --   --   --   --   --  120*  --  80*  --   --     < > = values in this interval not displayed.     MELD 3.0: 24 at 2025  " 7:18 AM  MELD-Na: 26 at 7/24/2025  7:18 AM  Calculated from:  Serum Creatinine: 0.73 mg/dL (Using min of 1 mg/dL) at 7/24/2025  7:18 AM  Serum Sodium: 134 mmol/L at 7/24/2025  7:18 AM  Total Bilirubin: 6.9 mg/dL at 7/24/2025  7:18 AM  Serum Albumin: 3.1 g/dL at 7/24/2025  7:18 AM  INR(ratio): 2.35 at 7/24/2025  7:18 AM  Age at listing (hypothetical): 49 years  Sex: Male at 7/24/2025  7:18 AM    MDF = 55.7    IMAGING:   US ABDOMEN LIMITED 7/18/2025  IMPRESSION:  1.  Cholelithiasis and borderline gallbladder wall thickening. No other sonographic evidence of acute cholecystitis.  2.  Hepatic steatosis and mild surface nodularity of the liver, suggesting chronic parenchymal liver disease, including hepatic steatosis with or without fibrosis. No focal hepatic masses visualized.     CT CHEST ABDOMEN PELVIS W CONTRAST 7/18/2025  IMPRESSION:  1.  Morphologic changes of cirrhosis and portal hypertension.  2.  Distended thick-walled gallbladder with dependent stones. Correlate clinically to exclude cholecystitis and consider ultrasound if warranted.  3.  New right upper lobe groundglass nodule. Adenocarcinoma not excluded. If more recent comparison images are unavailable, six-month follow-up recommended per Fleischner Society guidelines.  4.  Nonspecific left upper quadrant retroperitoneal stranding. Attention at follow-up advised.     ASSESSMENT:     Acute alcohol hepatitis. Patient's MELD-Na is 26 today and MDF 55.7. Will start steroids for alcohol hepatitis. Will need to calculate Lille score in 7 days.   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 CT with morphologic changes of cirrhosis and portal hypertension. Suspect alcohol as cause but checked viral hepatitis serologies.  Hepatitis B serologies consistent with recovery from prior hep B infection.   Confusion. Possible hepatic encephalopathy vs alcohol withdrawal. Ammonia mildly elevated at 61 on admission. Patient received one dose of lactulose. Confusion continues to improve.    Anemia. Hgb 11.3 on arrival. Hgb has remained stable around 9.5. No overt bleeding. Patient at risk for alcohol gastritis or portal hypertensive gastropathy in setting of cirrhosis. Will consider EGD pending clinical course, however no plans for this currently.     PLAN:  - Start prednisolone 40 mg daily  - Calculate Lille score in 1 week (7/31/2025)  - Obtain MELD labs (creatinine, bilirubin, INR, sodium, albumin) daily  - Alcohol withdrawal protocol per primary   - Thiamine and folic acid  - Monitor hgb, transfuse prn   - IV PPI BID  - Fluid and electrolyte management per primary   - Complete abstinence from alcohol  - Outpatient follow-up with Hepatology. [facility] will contact patient to arrange.     Discussed patient findings and plan with Dr. Woodward.     Total time: 25 minutes of total time was spent providing patient care, including patient evaluation, reviewing documentation/test results, and .     Helen Arshad PA-C  [facility] ([facility])

## 2025-07-24 NOTE — PLAN OF CARE
"Goal Outcome Evaluation:      Plan of Care Reviewed With: patient    Overall Patient Progress: no changeOverall Patient Progress: no change    Outcome Evaluation: Pt continues to set the bed off, noted increased confusion.  To Do:  End of Shift Summary  For vital signs and complete assessments, please see documentation flowsheets.     Pertinent assessments: Assumed cares @ 4471-5957. A&O to self with intermittent confusion. VSS except for fever, prn tylenol given for fever of 102.9, Recheck  98.4. Restless, noncompliant with call light use and/or gait belt/walker. CIWA 4 /5 and 3. Fair appetite, voiding adequately. Lactulose dose given at bedtime, voiding ok without issues. No BM on this shift. Tele reading SR heart rate 70 per tele tech.    Major Shift Events:  Increased Confusion    Treatment Plan: CIWA, electrolyte management, repeat MELD tomorrow per GI    Bedside Nurse: Parth Logan RN    Problem: Adult Inpatient Plan of Care  Goal: Plan of Care Review  Description: The Plan of Care Review/Shift note should be completed every shift.  The Outcome Evaluation is a brief statement about your assessment that the patient is improving, declining, or no change.  This information will be displayed automatically on your shift  note.  Outcome: Progressing  Flowsheets (Taken 7/24/2025 5927)  Outcome Evaluation: Pt continues to set the bed off, noted increased confusion.  Plan of Care Reviewed With: patient  Overall Patient Progress: no change  Goal: Patient-Specific Goal (Individualized)  Description: You can add care plan individualizations to a care plan. Examples of Individualization might be:  \"Parent requests to be called daily at 9am for status\", \"I have a hard time hearing out of my right ear\", or \"Do not touch me to wake me up as it startles  me\".  Outcome: Progressing  Goal: Absence of Hospital-Acquired Illness or Injury  Outcome: Progressing  Intervention: Identify and Manage Fall Risk  Recent Flowsheet " Documentation  Taken 7/23/2025 2139 by Parth Logan RN  Safety Promotion/Fall Prevention:   activity supervised   assistive device/personal items within reach   clutter free environment maintained   increase visualization of patient   lighting adjusted   nonskid shoes/slippers when out of bed   safety round/check completed   supervised activity   treat reversible contributory factors   treat underlying cause  Intervention: Prevent Skin Injury  Recent Flowsheet Documentation  Taken 7/23/2025 2139 by Parth Logan RN  Body Position: supine, head elevated  Skin Protection: adhesive use limited  Taken 7/23/2025 1942 by Parth Logan RN  Body Position: supine, head elevated  Intervention: Prevent and Manage VTE (Venous Thromboembolism) Risk  Recent Flowsheet Documentation  Taken 7/23/2025 2139 by Parth Logan RN  VTE Prevention/Management: SCDs off (sequential compression devices)  Intervention: Prevent Infection  Recent Flowsheet Documentation  Taken 7/23/2025 2139 by Parth Logan RN  Infection Prevention:   cohorting utilized   hand hygiene promoted   personal protective equipment utilized   single patient room provided  Goal: Optimal Comfort and Wellbeing  Outcome: Progressing  Intervention: Monitor Pain and Promote Comfort  Recent Flowsheet Documentation  Taken 7/23/2025 1942 by Parth Logan RN  Pain Management Interventions: medication (see MAR)  Goal: Readiness for Transition of Care  Outcome: Progressing     Problem: Fall Injury Risk  Goal: Absence of Fall and Fall-Related Injury  Outcome: Progressing  Intervention: Identify and Manage Contributors  Recent Flowsheet Documentation  Taken 7/23/2025 2139 by Parth Logan RN  Medication Review/Management: medications reviewed  Intervention: Promote Injury-Free Environment  Recent Flowsheet Documentation  Taken 7/23/2025 2139 by Parth Logan RN  Safety Promotion/Fall Prevention:   activity supervised   assistive device/personal  items within reach   clutter free environment maintained   increase visualization of patient   lighting adjusted   nonskid shoes/slippers when out of bed   safety round/check completed   supervised activity   treat reversible contributory factors   treat underlying cause     Problem: Alcohol Withdrawal  Goal: Alcohol Withdrawal Symptom Control  Outcome: Progressing  Goal: Optimal Neurologic Function  Outcome: Progressing  Goal: Readiness for Change Identified  Outcome: Progressing

## 2025-07-24 NOTE — DISCHARGE INSTRUCTIONS
Substance Use Resources:    Recommendations: If you are struggling with alcohol or substance abuse, please contact [facility] to schedule an outpatient TONI assessment.      [facility] - Assessments by appointment.  [address] [place] [address]  [phone]    Shana (Scheduling for all Katie and michael sites)  [facility]  [address] [place] [address], [address]  [place] [address]  [phone]    Sober Support Groups:    Alcoholics Anonymous: 1-800-ALCOHOL  HTTP://WWW.AA.ORG/  AA [place] (174-836-0230 or http://aaminneaPlanbus.org)  AA [place] (695-768-8925 or www.aastpaul.org)     Narcotics Anonymous: 866.466.7770  www.Quintiq.NUMBER26.     [place] Recovery Connection (MRC): [facility] connects people seeking recovery to resources that help foster and sustain long-term recovery. Whether you are seeking resources for treatment, transportation, housing, job training, education, health care or other pathways to recovery, [facility] is a great place to start. Phone: 651.151.3599. www.minnesotaeegoes.Imaxio (Great listing of all types of recovery and non-recovery related resources)     [employer]: https://www.[employer].org/ (Online meetings, support groups, resources)      The Recovery Worship:  Offers a wide range of different sober support groups and a Sunday Service.  [address] [place] [address]    https://www.[facility].org/#gsc.tab=0  Resources:  Some AA/NA meetings are being held online however most have returned to in-person or a hybrid combination please check online to verify*  Need Support Now? If you or someone you know is struggling or in crisis, help is available. Call or text [employer] or chat [employer].org  AA meetings search for them at: https://aa-intergroup.org (worldwide meeting listings)  AA meetings for [place] area can be found online at: https://aamin[place].org (click local online meetings listings)  NA meetings  "for [place] area can be found online at: https://www.CrunchedinPO-[place].org  (click find a meeting)  AA and NA Sponsors are excellent resources for support and you can find one at any support group meeting.   Alcoholics Anonymous (https://aa.org/): for information 24 hours/day  AA Intergroup service office in [place] (http://www.aastpaul.org/) 734.491.3093  AA Intergroup service office in [facility]: 372.148.5415. (http://www.aamin[place].org/)  Narcotics Anonymous (www.[employer].org) (486) 869-2711  https://aafairviewriverside.org/meetings  SMART Recovery - self management for addiction recovery:  www.OutituderecBRAINy.org  Pathways ~ A Health Crisis Resource & Support Center:  360.229.5751.  https://prescribetoprevent.org/patient-education/videos/  http://www.harmreduction.org  Crisis Text Line  Text 598742  You will be connected with a trained live crisis counselor to provide support. Por espanol, texto  EFREN a 035198 o texto a 442-AYUDAME en WhatsApp  [place] [facility] Line  1.230.SUICIDE [5128891]  [facility] 125-600-1163  Support Group:  AA/NA and Sponsor/support.  Fast Tracker  Linking people to mental health and substance use disorder resources  SABIAn.org   [facility] Warm Line  Peer to peer support  Monday thru Saturday, 12 pm to 10 pm  780.611.2494 or 5.917.164.3676  Text \"Support\" to 37200  National Ranger on Mental Illness (SOUMYA)  003.641.9934 or 1888.SOUMYA.HELPS  Alcoholics Anonymous (www.alcoholics-anonymous.org): Check your phone book for your local chapter.  Suicide Awareness Voices of Education (SAVE) (www.save.org): 629-134-LPNF (8595)  National Suicide Prevention Line (www.mentalhealthmn.org): 102-409-PCIF (6500)  Mental Health Consumer/Survivor Network of [place] (www.mhcsn.net): 967.148.6415 or 955-150-6395  Mental Health Association of [place] (www.mentalhealth.org): 297.601.5447 or 467-874-0000   Substance Abuse and Mental Health Services " (www.[facility].gov)  [place] Opioid Prevention Coalition: www.opioidcoalition.org     [place] Recovery Connection (MRC)  [facility] connects people seeking recovery to resources that help foster and sustain long-term recovery.  Whether you are seeking resources for treatment, transportation, housing, job training, education, health care or other pathways to recovery, [facility] is a great place to start.  493.439.2674.  www.[facility].org

## 2025-07-24 NOTE — PROGRESS NOTES
[facility]    Hospitalist Progress Note             Date of Admission:  7/20/2025                   Day of hospitalization: 3    Assessment and Plan:   Summary of Stay: Carlos A Lopez is a 49 year old male with a history of htn, hx of colon ca s/p right hemicolectomy followed by chemo, alcohol abuse and dependence admitted on 7/20/2025 with visual hallucinations      Here in the ED 2 days pta and noted to have a bili 5 and CT CAP with e/o cirrhosis with portal htn.  Incidentally noted to have cholelithiasis and GBW thickening without e/o cholecystitis. Spouse noted sclera icterus about a week ago. BAL at the visit 0.23 but denied significant drinking.  Discharged home with prn diazepam.  He did not have any alcohol and then started having visual hallucinations that seem real to him.He described seeing multiple women performing acrobatics including a trapeze in his home before coming in and then a man came and sat down by him which he thought was weird.      He also reports unintentional weight loss of 40 # over the last 6 months      VS with hypertension/tachycardia/and mildly elevated temp  CMP with hyponatremia 126, K 3.2, Mag, 1.0, phos 2.3.  LFTs  AST//24, bili 6.1.  INR  2.0  CBC macrocytic anemia 9.9, platelets low at 66     Problem List:     Visual hallucinations  Seem most consistent with alcohol withdrawal although hepatic encephalopathy could be contributing.   Improving, although having intermittent agitation in the ED     Alcohol withdrawal, dependence  Improved with alcohol withdrawal protocol  -remains on gabapentin taper but is a bit off balance and mildly lethargic, decreased dosing and will cont to taper and will stop clonidine   -continue with CIWA driven  benzo's and hasn't needed any benzo;s for 2 days   -chem dep eval     Cirrhosis,new diagnosis with decompensation   Splenomegaly  Alcoholic hepatitis  Likely related to alcohol use, but had SBP serologies  "consistent with previous infection  - Started on steroids by GI Harriett score in 7 days   - Monitor MELD scores daily     Fever in the evening?  No localizing symptoms other than mild right upper quadrant pain, CT abdomen pelvis in the ED showed gallbladder wall thickening however ultrasound minimal wall gallbladder thickening  -Otherwise no other symptoms, CT abdomen pelvis reviewed no ascites, UA on admission negative  - Blood cultures, UA repeat, HIDA scan ordered  - Zosyn started, general surgery consulted    Multiple metabolic derangements  Prolonged QTc  Hyponatremia 2nd etoh and improving appropriately - stopped IVF  Hypomag and hypoK replaced and rechecked  QTc improving but remains prolonged at 483     Normocytic anemia and thrombocytopenia   2* alcohol   No indication for transfusion      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 Adenocarcinoma not excluded. If more recent comparison images are unavailable, six-month follow-up recommended per Fleischner Society guidelines     # Code status: Full   # DVT: SCD  # IVF: None           Medically Ready for Discharge: Anticipated in 2-4 Days                    Christen Taylor MD    Subjective   Chief Complaint:  Hallucination     Patient with minimal complaints denies any chest pain shortness of breath nausea vomiting or abdominal pain        Objective   /66 (BP Location: Left arm)   Pulse 74   Temp 97.5  F (36.4  C) (Oral)   Resp 16   Ht 1.778 m (5' 10\")   Wt 79 kg (174 lb 2.6 oz)   SpO2 95%   BMI 24.99 kg/m       Physical Exam  General: Pt in NAD, normal appearance  HEENT: OP clear MMM, no JVD  Lungs: Clear to Auscultation Bilateral, normal breathing  without accessory muscle usage, no wheezing, rhonchi or crackles  Cardiac: +S1, S2, RRR, no MRG, no " "edema  Abdominal: normal bowel sounds, ND right upper quadrant tenderness  Skin: warm, dry, normal turgor, no rash  Psyche: A& O x3, appropriate affect             Intake/Output Summary (Last 24 hours) at 7/24/2025 1404  Last data filed at 7/24/2025 1015  Gross per 24 hour   Intake 200 ml   Output --   Net 200 ml           Labs and Imaging Results:      Recent Labs   Lab 07/24/25 0718 07/23/25 0636   WBC 6.4 7.0   HGB 9.8* 9.5*   PLT 98* 84*        Recent Labs   Lab 07/24/25 0718 07/23/25  0636   * 130*   CO2 25 23   BUN 8.8 7.3        Recent Labs   Lab 07/24/25 0718 07/22/25  0736   INR 2.35* 2.09*      No results for input(s): \"CKMB\" in the last 168 hours.    Invalid input(s): \"TROPONINT\"     Recent Labs   Lab 07/24/25 0718 07/23/25 0636   ALBUMIN 3.1* 3.2*   * 102*   ALT 22 21   ALKPHOS 105 111        Micro:     Radio:  US Abdomen Limited    (Results Pending)   NM HepatOBiliary Scan    (Results Pending)           Medications:      Scheduled Meds:    Current Facility-Administered Medications   Medication Dose Route Frequency Provider Last Rate Last Admin    folic acid (FOLVITE) tablet 1 mg  1 mg Oral Daily Jordon Schaefer MD   1 mg at 07/24/25 0841    [START ON 7/27/2025] gabapentin (NEURONTIN) capsule 100 mg  100 mg Oral BID Morenita Gusman MD        [START ON 7/25/2025] gabapentin (NEURONTIN) capsule 300 mg  300 mg Oral BID Morenita Gusman MD        gabapentin (NEURONTIN) tablet 600 mg  600 mg Oral BID Morenita Gusman MD   600 mg at 07/24/25 0841    lactulose (CHRONULAC) solution 20 g  20 g Oral BID Nader Salinas DO   20 g at 07/24/25 0841    multivitamin w/minerals (THERA-VIT-M) tablet 1 tablet  1 tablet Oral Daily Jordon Schaefer MD   1 tablet at 07/24/25 0841    pantoprazole (PROTONIX) injection 40 mg  40 mg Intravenous BID Jordon Schaefer MD   40 mg at 07/24/25 1045    piperacillin-tazobactam (ZOSYN) 3.375 g vial to attach to  mL bag  3.375 g Intravenous Q6H " Christen Taylor MD   3.375 g at 07/24/25 1241    prednisoLONE (ORAPRED) 15 MG/5ML solution 40 mg  40 mg Oral Daily Helen Arshad PA-C   40 mg at 07/24/25 1235    sodium chloride (PF) 0.9% PF flush 3 mL  3 mL Intracatheter Q8H [facility] Jordon Schaefer MD   3 mL at 07/24/25 0845    thiamine (B-1) tablet 100 mg  100 mg Oral Daily Jordon Schaefer MD   100 mg at 07/24/25 0841     Continuous Infusions:    Current Facility-Administered Medications   Medication Dose Route Frequency Provider Last Rate Last Admin     PRN Meds:    Current Facility-Administered Medications   Medication Dose Route Frequency Provider Last Rate Last Admin    acetaminophen (TYLENOL) tablet 650 mg  650 mg Oral Q4H PRN Jordon Schaefer MD   650 mg at 07/23/25 1942    Or    acetaminophen (TYLENOL) Suppository 650 mg  650 mg Rectal Q4H PRN Jordon Schaefer MD        calcium carbonate (TUMS) chewable tablet 1,000 mg  1,000 mg Oral 4x Daily PRN Jordon Schaefer MD        diazepam (VALIUM) tablet 10 mg  10 mg Oral Q30 Min PRN Jordon Schaefer MD   10 mg at 07/23/25 1835    Or    diazepam (VALIUM) injection 5-10 mg  5-10 mg Intravenous Q30 Min PRN Jordon Schaefer MD   5 mg at 07/21/25 2227    flumazenil (ROMAZICON) injection 0.2 mg  0.2 mg Intravenous q1 min prn Jordon Schaefer MD        lidocaine (LMX4) cream   Topical Q1H PRN Jordon Schaefer MD        lidocaine 1 % 0.1-1 mL  0.1-1 mL Other Q1H PRN Jordon Schaefer MD        melatonin tablet 5 mg  5 mg Oral QPM PRN Jordon Schaefer MD        ondansetron (ZOFRAN ODT) ODT tab 4 mg  4 mg Oral Q6H PRN Jordon Schaefer MD        Or    ondansetron (ZOFRAN) injection 4 mg  4 mg Intravenous Q6H PRN Jordon Schaefer MD        prochlorperazine (COMPAZINE) injection 10 mg  10 mg Intravenous Q6H PRN Jordon Schaefer MD        Or    prochlorperazine (COMPAZINE) tablet 10 mg  10 mg Oral Q6H PRN Jordon Schaefer MD         senna-docusate (SENOKOT-S/PERICOLACE) 8.6-50 MG per tablet 1 tablet  1 tablet Oral BID PRN Jordon Schaefer MD        Or    senna-docusate (SENOKOT-S/PERICOLACE) 8.6-50 MG per tablet 2 tablet  2 tablet Oral BID PRN Jordon Schaefer MD        sodium chloride (PF) 0.9% PF flush 3 mL  3 mL Intracatheter q1 min prn Jordon Schaefer MD

## 2025-07-24 NOTE — PLAN OF CARE
Goal Outcome Evaluation:    End of Shift Summary  For vital signs and complete assessments, please see documentation flowsheets.     Pertinent assessments: A&O initially, blatant confusion towards end of shift - oriented x self only. Restless, noncompliant with call light use and/or gait belt/walker. CIWA 2/1/1/9 - 10mg PO Valium x1 this shift. Fair appetite, voiding adequately. Up in chair and ambulated in quispe with assist.    Major Shift Events: K, Mg & P replaced per protocol. Pt found standing at bedside without assistance on numerous occasions - it's possible patient knows how to disable bed alarm - informed oncoming staff for increased awareness.   Treatment Plan: CIWA, electrolyte management, repeat MELD tomorrow per GI  Bedside Nurse: Shilpa Queen RN       Plan of Care Reviewed With: patient, spouse    Overall Patient Progress: no changeOverall Patient Progress: no change

## 2025-07-25 ENCOUNTER — APPOINTMENT (OUTPATIENT)
Dept: PHYSICAL THERAPY | Facility: CLINIC | Age: 50
End: 2025-07-25
Payer: COMMERCIAL

## 2025-07-25 ENCOUNTER — APPOINTMENT (OUTPATIENT)
Dept: NUCLEAR MEDICINE | Facility: CLINIC | Age: 50
End: 2025-07-25
Attending: SURGERY
Payer: COMMERCIAL

## 2025-07-25 LAB
ALBUMIN SERPL BCG-MCNC: 3.2 G/DL (ref 3.5–5.2)
ALP SERPL-CCNC: 116 U/L (ref 40–150)
ALT SERPL W P-5'-P-CCNC: 24 U/L (ref 0–70)
ANION GAP SERPL CALCULATED.3IONS-SCNC: 12 MMOL/L (ref 7–15)
AST SERPL W P-5'-P-CCNC: 92 U/L (ref 0–45)
BILIRUB SERPL-MCNC: 6.7 MG/DL
BUN SERPL-MCNC: 11.1 MG/DL (ref 6–20)
CALCIUM SERPL-MCNC: 9.6 MG/DL (ref 8.8–10.4)
CHLORIDE SERPL-SCNC: 97 MMOL/L (ref 98–107)
CREAT SERPL-MCNC: 0.66 MG/DL (ref 0.67–1.17)
EGFRCR SERPLBLD CKD-EPI 2021: >90 ML/MIN/1.73M2
ERYTHROCYTE [DISTWIDTH] IN BLOOD BY AUTOMATED COUNT: 14.5 % (ref 10–15)
GLUCOSE SERPL-MCNC: 116 MG/DL (ref 70–99)
HCO3 SERPL-SCNC: 23 MMOL/L (ref 22–29)
HCT VFR BLD AUTO: 31.6 % (ref 40–53)
HGB BLD-MCNC: 11.3 G/DL (ref 13.3–17.7)
INR PPP: 2.13 (ref 0.85–1.15)
MAGNESIUM SERPL-MCNC: 1.4 MG/DL (ref 1.7–2.3)
MAGNESIUM SERPL-MCNC: 2.1 MG/DL (ref 1.7–2.3)
MCH RBC QN AUTO: 38.2 PG (ref 26.5–33)
MCHC RBC AUTO-ENTMCNC: 35.8 G/DL (ref 31.5–36.5)
MCV RBC AUTO: 107 FL (ref 78–100)
PHOSPHATE SERPL-MCNC: 3.3 MG/DL (ref 2.5–4.5)
PLATELET # BLD AUTO: 160 10E3/UL (ref 150–450)
POTASSIUM SERPL-SCNC: 4.1 MMOL/L (ref 3.4–5.3)
PROT SERPL-MCNC: 8.1 G/DL (ref 6.4–8.3)
PROTHROMBIN TIME: 23.6 SECONDS (ref 11.8–14.8)
RBC # BLD AUTO: 2.96 10E6/UL (ref 4.4–5.9)
SODIUM SERPL-SCNC: 132 MMOL/L (ref 135–145)
WBC # BLD AUTO: 7.3 10E3/UL (ref 4–11)

## 2025-07-25 PROCEDURE — 84100 ASSAY OF PHOSPHORUS: CPT | Performed by: HOSPITALIST

## 2025-07-25 PROCEDURE — 99232 SBSQ HOSP IP/OBS MODERATE 35: CPT | Performed by: HOSPITALIST

## 2025-07-25 PROCEDURE — 120N000001 HC R&B MED SURG/OB

## 2025-07-25 PROCEDURE — 250N000013 HC RX MED GY IP 250 OP 250 PS 637: Performed by: INTERNAL MEDICINE

## 2025-07-25 PROCEDURE — 97750 PHYSICAL PERFORMANCE TEST: CPT | Mod: GP

## 2025-07-25 PROCEDURE — 36415 COLL VENOUS BLD VENIPUNCTURE: CPT | Performed by: HOSPITALIST

## 2025-07-25 PROCEDURE — 97116 GAIT TRAINING THERAPY: CPT | Mod: GP

## 2025-07-25 PROCEDURE — A9537 TC99M MEBROFENIN: HCPCS | Performed by: SURGERY

## 2025-07-25 PROCEDURE — 250N000013 HC RX MED GY IP 250 OP 250 PS 637: Performed by: HOSPITALIST

## 2025-07-25 PROCEDURE — 84155 ASSAY OF PROTEIN SERUM: CPT | Performed by: HOSPITALIST

## 2025-07-25 PROCEDURE — 85610 PROTHROMBIN TIME: CPT | Performed by: HOSPITALIST

## 2025-07-25 PROCEDURE — 250N000011 HC RX IP 250 OP 636: Performed by: HOSPITALIST

## 2025-07-25 PROCEDURE — 343N000001 HC RX 343 MED OP 636: Performed by: SURGERY

## 2025-07-25 PROCEDURE — 85018 HEMOGLOBIN: CPT | Performed by: HOSPITALIST

## 2025-07-25 PROCEDURE — 83735 ASSAY OF MAGNESIUM: CPT | Performed by: HOSPITALIST

## 2025-07-25 PROCEDURE — 250N000011 HC RX IP 250 OP 636: Performed by: INTERNAL MEDICINE

## 2025-07-25 PROCEDURE — 250N000012 HC RX MED GY IP 250 OP 636 PS 637: Performed by: STUDENT IN AN ORGANIZED HEALTH CARE EDUCATION/TRAINING PROGRAM

## 2025-07-25 PROCEDURE — 78226 HEPATOBILIARY SYSTEM IMAGING: CPT

## 2025-07-25 RX ORDER — MAGNESIUM SULFATE HEPTAHYDRATE 40 MG/ML
4 INJECTION, SOLUTION INTRAVENOUS ONCE
Status: COMPLETED | OUTPATIENT
Start: 2025-07-25 | End: 2025-07-25

## 2025-07-25 RX ORDER — KIT FOR THE PREPARATION OF TECHNETIUM TC 99M MEBROFENIN 45 MG/10ML
6 INJECTION, POWDER, LYOPHILIZED, FOR SOLUTION INTRAVENOUS ONCE
Status: COMPLETED | OUTPATIENT
Start: 2025-07-25 | End: 2025-07-25

## 2025-07-25 RX ADMIN — LACTULOSE 20 G: 20 SOLUTION ORAL at 21:15

## 2025-07-25 RX ADMIN — PREDNISOLONE SODIUM PHOSPHATE 40 MG: 15 SOLUTION ORAL at 09:18

## 2025-07-25 RX ADMIN — PIPERACILLIN AND TAZOBACTAM 3.38 G: 3; .375 INJECTION, POWDER, FOR SOLUTION INTRAVENOUS at 00:27

## 2025-07-25 RX ADMIN — LACTULOSE 20 G: 20 SOLUTION ORAL at 09:11

## 2025-07-25 RX ADMIN — Medication 1 TABLET: at 09:11

## 2025-07-25 RX ADMIN — MEBROFENIN 6.5 MILLICURIE: 45 INJECTION, POWDER, LYOPHILIZED, FOR SOLUTION INTRAVENOUS at 07:14

## 2025-07-25 RX ADMIN — PANTOPRAZOLE SODIUM 40 MG: 40 INJECTION, POWDER, LYOPHILIZED, FOR SOLUTION INTRAVENOUS at 09:11

## 2025-07-25 RX ADMIN — PANTOPRAZOLE SODIUM 40 MG: 40 INJECTION, POWDER, LYOPHILIZED, FOR SOLUTION INTRAVENOUS at 21:15

## 2025-07-25 RX ADMIN — THIAMINE HCL TAB 100 MG 100 MG: 100 TAB at 09:11

## 2025-07-25 RX ADMIN — PIPERACILLIN AND TAZOBACTAM 3.38 G: 3; .375 INJECTION, POWDER, FOR SOLUTION INTRAVENOUS at 05:38

## 2025-07-25 RX ADMIN — FOLIC ACID 1 MG: 1 TABLET ORAL at 09:11

## 2025-07-25 RX ADMIN — GABAPENTIN 300 MG: 300 CAPSULE ORAL at 09:11

## 2025-07-25 RX ADMIN — MAGNESIUM SULFATE HEPTAHYDRATE 4 G: 40 INJECTION, SOLUTION INTRAVENOUS at 10:40

## 2025-07-25 RX ADMIN — GABAPENTIN 300 MG: 300 CAPSULE ORAL at 21:15

## 2025-07-25 ASSESSMENT — ACTIVITIES OF DAILY LIVING (ADL)
ADLS_ACUITY_SCORE: 38

## 2025-07-25 NOTE — PROGRESS NOTES
[facility]    Hospitalist Progress Note             Date of Admission:  7/20/2025                   Day of hospitalization: 4    Assessment and Plan:   Summary of Stay: Carlos A Lopez is a 49 year old male with a history of htn, hx of colon ca s/p right hemicolectomy followed by chemo, alcohol abuse and dependence admitted on 7/20/2025 with visual hallucinations      Here in the ED 2 days pta and noted to have a bili 5 and CT CAP with e/o cirrhosis with portal htn.  Incidentally noted to have cholelithiasis and GBW thickening without e/o cholecystitis. Spouse noted sclera icterus about a week ago. BAL at the visit 0.23 but denied significant drinking.  Discharged home with prn diazepam.  He did not have any alcohol and then started having visual hallucinations that seem real to him.He described seeing multiple women performing acrobatics including a trapeze in his home before coming in and then a man came and sat down by him which he thought was weird.      He also reports unintentional weight loss of 40 # over the last 6 months      VS with hypertension/tachycardia/and mildly elevated temp  CMP with hyponatremia 126, K 3.2, Mag, 1.0, phos 2.3.  LFTs  AST//24, bili 6.1.  INR  2.0  CBC macrocytic anemia 9.9, platelets low at 66     Problem List:     Visual hallucinations  Seem most consistent with alcohol withdrawal although hepatic encephalopathy could be contributing.   Improving,     Alcohol withdrawal, dependence  Improved with alcohol withdrawal protocol  -remains on gabapentin taper   -continue with CIWA driven  benzo's and hasn't needed any benzo  -chem dep eval     Cirrhosis,new diagnosis with decompensation   Splenomegaly  Alcoholic hepatitis  Likely related to alcohol use, but had SBP serologies consistent with previous infection  - Started on steroids by GI Harriett score in 7 days, Gastroenterology will arrange follow-up for this if discharged  - Monitor MELD scores daily    "  Fever in the evening?  No localizing symptoms other than mild right upper quadrant pain, CT abdomen pelvis in the ED showed gallbladder wall thickening however ultrasound minimal wall gallbladder thickening  -Otherwise no other symptoms, CT abdomen pelvis reviewed no ascites, UA on admission negative  - Blood cultures no growth so far, HIDA scan negative, UA negative  - I will stop the Zosyn and monitor    Multiple metabolic derangements  Prolonged QTc  Hyponatremia 2nd etoh and improving appropriately - stopped IVF  Hypomag and hypoK replaced and rechecked  QTc improving but remains prolonged at 483     Normocytic anemia and thrombocytopenia   2* alcohol   No indication for transfusion      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 Adenocarcinoma not excluded. If more recent comparison images are unavailable, six-month follow-up recommended per Fleischner Society guidelines     # Code status: Full   # DVT: SCD  # IVF: None           Medically Ready for Discharge: Anticipated Tomorrow                    Christen Taylor MD    Subjective   Chief Complaint:  Hallucination     Patient with minimal complaints denies any chest pain shortness of breath nausea vomiting or abdominal pain        Objective   /77 (BP Location: Right arm)   Pulse 86   Temp 98.1  F (36.7  C) (Oral)   Resp 16   Ht 1.778 m (5' 10\")   Wt 79 kg (174 lb 2.6 oz)   SpO2 96%   BMI 24.99 kg/m       Physical Exam  General: Pt in NAD, normal appearance  HEENT: OP clear MMM, no JVD  Lungs: Clear to Auscultation Bilateral, normal breathing  without accessory muscle usage, no wheezing, rhonchi or crackles  Cardiac: +S1, S2, RRR, no MRG, no edema  Abdominal: normal bowel sounds, ND/NT  Skin: warm, dry, normal turgor, no rash  Psyche: A& O x3, " "appropriate affect             Intake/Output Summary (Last 24 hours) at 7/24/2025 1404  Last data filed at 7/24/2025 1015  Gross per 24 hour   Intake 200 ml   Output --   Net 200 ml           Labs and Imaging Results:      Recent Labs   Lab 07/25/25  0904 07/24/25 0718   WBC 7.3 6.4   HGB 11.3* 9.8*    98*        Recent Labs   Lab 07/25/25  0904 07/24/25 0718   * 134*   CO2 23 25   BUN 11.1 8.8        Recent Labs   Lab 07/25/25  0904 07/24/25 0718   INR 2.13* 2.35*      No results for input(s): \"CKMB\" in the last 168 hours.    Invalid input(s): \"TROPONINT\"     Recent Labs   Lab 07/25/25  0904 07/24/25 0718   ALBUMIN 3.2* 3.1*   AST 92* 109*   ALT 24 22   ALKPHOS 116 105        Micro:     Radio:  NM HepatOBiliary Scan   Final Result   IMPRESSION:       Negative for acute cholecystitis.      US Abdomen Limited   Final Result   IMPRESSION:   1.  Cholelithiasis with some mild gallbladder wall edema but no additional evidence of acute cholecystitis or biliary obstruction.   2.  Morphologic changes of liver disease/cirrhosis. No ascites.                    Medications:      Scheduled Meds:    Current Facility-Administered Medications   Medication Dose Route Frequency Provider Last Rate Last Admin    folic acid (FOLVITE) tablet 1 mg  1 mg Oral Daily Jordon Schaefer MD   1 mg at 07/25/25 0911    [START ON 7/27/2025] gabapentin (NEURONTIN) capsule 100 mg  100 mg Oral BID Morenita Gusman MD        gabapentin (NEURONTIN) capsule 300 mg  300 mg Oral BID Morenita Gusman MD   300 mg at 07/25/25 0911    lactulose (CHRONULAC) solution 20 g  20 g Oral BID Nader Salinas DO   20 g at 07/25/25 0911    multivitamin w/minerals (THERA-VIT-M) tablet 1 tablet  1 tablet Oral Daily Jordon Schaefer MD   1 tablet at 07/25/25 0911    pantoprazole (PROTONIX) injection 40 mg  40 mg Intravenous BID Jordon Schaefer MD   40 mg at 07/25/25 0911    prednisoLONE (ORAPRED) 15 MG/5ML solution 40 mg  40 mg Oral " Daily Helen Arshad PA-C   40 mg at 07/25/25 0918    sodium chloride (PF) 0.9% PF flush 3 mL  3 mL Intracatheter Q8H [facility] Jordon Schaefer MD   3 mL at 07/25/25 0912     Continuous Infusions:    Current Facility-Administered Medications   Medication Dose Route Frequency Provider Last Rate Last Admin     PRN Meds:    Current Facility-Administered Medications   Medication Dose Route Frequency Provider Last Rate Last Admin    acetaminophen (TYLENOL) tablet 650 mg  650 mg Oral Q4H PRN Jordon Schaefer MD   650 mg at 07/23/25 1942    Or    acetaminophen (TYLENOL) Suppository 650 mg  650 mg Rectal Q4H PRN Jordon Schaefer MD        calcium carbonate (TUMS) chewable tablet 1,000 mg  1,000 mg Oral 4x Daily PRN Jordon Schaefer MD        diazepam (VALIUM) tablet 10 mg  10 mg Oral Q30 Min PRN Jordon Schaefer MD   10 mg at 07/23/25 1835    Or    diazepam (VALIUM) injection 5-10 mg  5-10 mg Intravenous Q30 Min PRN Jordon Schaefer MD   5 mg at 07/21/25 2227    flumazenil (ROMAZICON) injection 0.2 mg  0.2 mg Intravenous q1 min prn Jordon Schaefer MD        lidocaine (LMX4) cream   Topical Q1H PRN Jordon Schaefer MD        lidocaine 1 % 0.1-1 mL  0.1-1 mL Other Q1H PRN Jordon Schaefer MD        melatonin tablet 5 mg  5 mg Oral QPM PRN Jordon Schaefer MD        ondansetron (ZOFRAN ODT) ODT tab 4 mg  4 mg Oral Q6H PRN Jordon Schaefer MD        Or    ondansetron (ZOFRAN) injection 4 mg  4 mg Intravenous Q6H PRN Jordon Schaefer MD        prochlorperazine (COMPAZINE) injection 10 mg  10 mg Intravenous Q6H PRN Jordon Schaefer MD        Or    prochlorperazine (COMPAZINE) tablet 10 mg  10 mg Oral Q6H PRN Jordon Schaefer MD        senna-docusate (SENOKOT-S/PERICOLACE) 8.6-50 MG per tablet 1 tablet  1 tablet Oral BID Jordon Estrada MD        Or    senna-docusate (SENOKOT-S/PERICOLACE) 8.6-50 MG per tablet 2 tablet  2 tablet Oral  BID PRN Jordon Schaefer MD        sodium chloride (PF) 0.9% PF flush 3 mL  3 mL Intracatheter q1 min prn Jordon Schaefer MD

## 2025-07-25 NOTE — PLAN OF CARE
"Goal Outcome Evaluation:    End of Shift Summary  For vital signs and complete assessments, please see documentation flowsheets.     Pertinent assessments: Pt A&O. CIWAs negative. VSS, afebrile & sats maintained on RA. C/o mild pain in RUQ after eating, denies nausea. Good appetite, voiding adequately. +BM this shift.    Major Shift Events: Abd US completed. Infectious workup done, all results negative so far.  Treatment Plan: HIDA scan tomorrow, zosyn, lactulose, prednisolone  Bedside Nurse: Shilpa Queen RN       Plan of Care Reviewed With: patient, spouse    Overall Patient Progress: no changeOverall Patient Progress: no change    Problem: Adult Inpatient Plan of Care  Goal: Plan of Care Review  Description: The Plan of Care Review/Shift note should be completed every shift.  The Outcome Evaluation is a brief statement about your assessment that the patient is improving, declining, or no change.  This information will be displayed automatically on your shift  note.  Outcome: Progressing  Flowsheets (Taken 7/24/2025 1953)  Plan of Care Reviewed With:   patient   spouse  Overall Patient Progress: no change  Goal: Patient-Specific Goal (Individualized)  Description: You can add care plan individualizations to a care plan. Examples of Individualization might be:  \"Parent requests to be called daily at 9am for status\", \"I have a hard time hearing out of my right ear\", or \"Do not touch me to wake me up as it startles  me\".  Outcome: Progressing  Goal: Absence of Hospital-Acquired Illness or Injury  Outcome: Progressing  Intervention: Identify and Manage Fall Risk  Recent Flowsheet Documentation  Taken 7/24/2025 1051 by Shilpa Queen, RN  Safety Promotion/Fall Prevention:   activity supervised   assistive device/personal items within reach   clutter free environment maintained   increase visualization of patient   lighting adjusted   nonskid shoes/slippers when out of bed   safety round/check completed   supervised " Kamala Norton  : 1937  Age [de-identified]year old  female patient is admitted to [place] [id number] for strengthening and rehab on 10/20/2017 and IV antibx    Admitted to [facility] on:10/13/2017-10/20/activity   treat reversible contributory factors   treat underlying cause  Intervention: Prevent Skin Injury  Recent Flowsheet Documentation  Taken 7/24/2025 1051 by Shilpa Queen RN  Body Position: supine, head elevated  Intervention: Prevent and Manage VTE (Venous Thromboembolism) Risk  Recent Flowsheet Documentation  Taken 7/24/2025 1051 by Shilpa Queen, RN  VTE Prevention/Management: SCDs off (sequential compression devices)  Goal: Optimal Comfort and Wellbeing  Outcome: Progressing  Goal: Readiness for Transition of Care  Outcome: Progressing      

## 2025-07-25 NOTE — PLAN OF CARE
"Goal Outcome Evaluation:      Plan of Care Reviewed With: patient    Overall Patient Progress: improvingOverall Patient Progress: improving       Vitals are Temp: 97.5  F (36.4  C) Temp src: Oral BP: 136/79 Pulse: 69   Resp: 16 SpO2: 95 %.  Patient is alert and oriented with intermittent confusion.They are SBA with Gait Belt and Walker. Pt is on  NPO diet.  CIWA score -zero.They are denying pain.  Patient is Saline locked. pt on tele SR 60's. GI and Gen surg are following- plan for a HIDA scan today.      Problem: Adult Inpatient Plan of Care  Goal: Plan of Care Review  Description: The Plan of Care Review/Shift note should be completed every shift.  The Outcome Evaluation is a brief statement about your assessment that the patient is improving, declining, or no change.  This information will be displayed automatically on your shift  note.  Outcome: Progressing  Flowsheets (Taken 7/25/2025 0315)  Plan of Care Reviewed With: patient  Overall Patient Progress: improving  Goal: Patient-Specific Goal (Individualized)  Description: You can add care plan individualizations to a care plan. Examples of Individualization might be:  \"Parent requests to be called daily at 9am for status\", \"I have a hard time hearing out of my right ear\", or \"Do not touch me to wake me up as it startles  me\".  Outcome: Progressing  Goal: Absence of Hospital-Acquired Illness or Injury  Outcome: Progressing  Intervention: Identify and Manage Fall Risk  Recent Flowsheet Documentation  Taken 7/25/2025 0145 by Ava Araya RN  Safety Promotion/Fall Prevention:   activity supervised   safety round/check completed  Intervention: Prevent Skin Injury  Recent Flowsheet Documentation  Taken 7/25/2025 0026 by Ava Araya RN  Body Position: position changed independently  Intervention: Prevent and Manage VTE (Venous Thromboembolism) Risk  Recent Flowsheet Documentation  Taken 7/25/2025 0145 by Ava Araya RN  VTE Prevention/Management: " SCDs off (sequential compression devices)  Goal: Optimal Comfort and Wellbeing  Outcome: Progressing  Intervention: Monitor Pain and Promote Comfort  Recent Flowsheet Documentation  Taken 7/25/2025 0026 by Ava Araya RN  Pain Management Interventions: medication (see MAR)  Goal: Readiness for Transition of Care  Outcome: Progressing     Problem: Fall Injury Risk  Goal: Absence of Fall and Fall-Related Injury  Outcome: Progressing  Intervention: Identify and Manage Contributors  Recent Flowsheet Documentation  Taken 7/25/2025 0145 by Ava Araya, RN  Medication Review/Management: medications reviewed  Intervention: Promote Injury-Free Environment  Recent Flowsheet Documentation  Taken 7/25/2025 0145 by Ava Araya, RN  Safety Promotion/Fall Prevention:   activity supervised   safety round/check completed     Problem: Alcohol Withdrawal  Goal: Alcohol Withdrawal Symptom Control  Outcome: Progressing  Goal: Optimal Neurologic Function  Outcome: Progressing  Goal: Readiness for Change Identified  Outcome: Progressing

## 2025-07-25 NOTE — PROGRESS NOTES
"GASTROENTEROLOGY PROGRESS NOTE     SUBJECTIVE:  Appears more alert today. Sitting up in chair eating breakfast. Feels well. HIDA scan yesterday negative for acute cholecystitis.      OBJECTIVE:  /77 (BP Location: Right arm)   Pulse 86   Temp 98.1  F (36.7  C) (Oral)   Resp 16   Ht 1.778 m (5' 10\")   Wt 79 kg (174 lb 2.6 oz)   SpO2 96%   BMI 24.99 kg/m    Temp (24hrs), Av.9  F (36.6  C), Min:97.5  F (36.4  C), Max:98.2  F (36.8  C)    No data found.    Intake/Output Summary (Last 24 hours) at 2025 0957  Last data filed at 2025 1627  Gross per 24 hour   Intake 200 ml   Output 225 ml   Net -25 ml        PHYSICAL EXAM  GENERAL: No obvious distress  EYES: Scleral icterus  ABDOMEN: Non-distended. Positive bowel sounds. Soft. Non-tender.  SKIN: Jaundice  NEUROLOGIC: Alert and oriented. No asterixis.   PSYCHIATRIC: Normal affect     Additional Comments:  ROS, FH, SH: See initial GI consult for details.     I have reviewed the patient's new clinical lab results:     Recent Labs   Lab Test 25  0904 25  0718 25  0636 25  0736   WBC 7.3 6.4 7.0 7.3   HGB 11.3* 9.8* 9.5* 9.4*   * 109* 108* 109*    98* 84* 71*   INR 2.13* 2.35*  --  2.09*     Recent Labs   Lab Test 25  0904 25  0718 25  1631 25  0636   POTASSIUM 4.1 3.9 3.7 3.2*   CHLORIDE 97* 98  --  95*   CO2   --     BUN 11.1 8.8  --  7.3   ANIONGAP 12 11  --  12     Recent Labs   Lab Test 25  0904 25  1626 25  0718 25  0636 25  0706 25  0023 25  2330 07/25   ALBUMIN 3.2*  --  3.1* 3.2*   < >  --  3.8  --  3.9   BILITOTAL 6.7*  --  6.9* 5.8*   < >  --  7.1*  --  5.1*   ALT 24  --  22 21   < >  --  28  --  27   AST 92*  --  109* 102*   < >  --  176*  --  200*   PROTEIN  --  Negative  --   --   --  Negative  --  Negative  --    LIPASE  --   --   --   --   --   --  120*  --  80*    < > = values in this interval not " displayed.     MELD 3.0: 24 at 7/25/2025  9:04 AM  MELD-Na: 25 at 7/25/2025  9:04 AM  Calculated from:  Serum Creatinine: 0.66 mg/dL (Using min of 1 mg/dL) at 7/25/2025  9:04 AM  Serum Sodium: 132 mmol/L at 7/25/2025  9:04 AM  Total Bilirubin: 6.7 mg/dL at 7/25/2025  9:04 AM  Serum Albumin: 3.2 g/dL at 7/25/2025  9:04 AM  INR(ratio): 2.13 at 7/25/2025  9:04 AM  Age at listing (hypothetical): 49 years  Sex: Male at 7/25/2025  9:04 AM    IMAGING:   US ABDOMEN LIMITED 7/18/2025  IMPRESSION:  1.  Cholelithiasis and borderline gallbladder wall thickening. No other sonographic evidence of acute cholecystitis.  2.  Hepatic steatosis and mild surface nodularity of the liver, suggesting chronic parenchymal liver disease, including hepatic steatosis with or without fibrosis. No focal hepatic masses visualized.     CT CHEST ABDOMEN PELVIS W CONTRAST 7/18/2025  IMPRESSION:  1.  Morphologic changes of cirrhosis and portal hypertension.  2.  Distended thick-walled gallbladder with dependent stones. Correlate clinically to exclude cholecystitis and consider ultrasound if warranted.  3.  New right upper lobe groundglass nodule. Adenocarcinoma not excluded. If more recent comparison images are unavailable, six-month follow-up recommended per Fleischner Society guidelines.  4.  Nonspecific left upper quadrant retroperitoneal stranding. Attention at follow-up advised.    NM HEPATOBILIARY SCAN 7/25/2025  IMPRESSION:   Negative for acute cholecystitis.    ASSESSMENT:     Acute alcohol hepatitis. Started steroids on 7/24. Will need to calculate Lille score in on 7/31. MELD trending down today.  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 CT with morphologic changes of cirrhosis and portal hypertension. Suspect alcohol as cause but checked viral hepatitis serologies.  Hepatitis B serologies consistent with recovery from prior hep B infection.   Confusion. Possible hepatic encephalopathy vs alcohol withdrawal. Ammonia mildly elevated at 61 on admission.  Patient received one dose of lactulose. Confusion continues to improve.   Anemia. Hgb 11.3 on arrival. Hgb has remained stable around 9.5. No overt bleeding. Patient at risk for alcohol gastritis or portal hypertensive gastropathy in setting of cirrhosis. Will consider EGD pending clinical course, however no plans for this currently.     PLAN:  - Continue prednisolone 40 mg daily  - Calculate Lille score in 1 week (7/31/2025). If patient discharges before this, we will arrange labs as outpatient.   - Obtain MELD labs (creatinine, bilirubin, INR, sodium, albumin) daily  - Alcohol withdrawal protocol per primary   - Thiamine and folic acid  - Monitor hgb, transfuse prn   - IV PPI BID  - Fluid and electrolyte management per primary   - Complete abstinence from alcohol  - Outpatient follow-up with Hepatology. [facility] will contact patient to arrange.     Discussed patient findings and plan with Dr. Woodward.     Total time: 25 minutes of total time was spent providing patient care, including patient evaluation, reviewing documentation/test results, and .     Helen Arshad PA-C  [facility] ([facility])

## 2025-07-25 NOTE — PROGRESS NOTES
07/25/25 1444   Signing Clinician's Name / Credentials   Signing clinician's name / credentials Marga Haile, PT, DPT   Functional Gait Assessment (CLAUDIA Rivera, PRIYA Bustillos, et al. (2004))   1. GAIT LEVEL SURFACE 3   2. CHANGE IN GAIT SPEED 3   3. GAIT WITH HORIZONTAL HEAD TURNS 2   4. GAIT WITH VERTICAL HEAD TURNS 3   5. GAIT AND PIVOT TURN 2   6. STEP OVER OBSTACLE 2   7. GAIT WITH NARROW BASE OF SUPPORT 1   8. GAIT WITH EYES CLOSED 2   9. AMBULATING BACKWARDS 2   10. STEPS 2   Total Functional Gait Assessment Score   TOTAL SCORE: (MAXIMUM SCORE 30) 22       Functional Gait Assessment (FGA): The FGA assesses postural stability during various walking tasks.     Gait assistive device used: None     Scores of <22 /30 have been correlated with predicting falls in community-dwelling older adults according to Nicole & Yeyo 2010.   Scores of <18 /30 have been correlated with increased risk for falls in patients with Parkinsons Disease according to Geronimo Aguilar, Gomez et al 2014.  Minimal Detectable Change for patients with acute/chronic stroke = 4.2 according to Thijero & Ritschel 2009  Minimal Detectable Change for patients with vestibular disorder = 8 according to Nicole & Yeyo 2010     Assessment (rationale for performing, application to patient s function & care plan): Performed to assess balance with gait. Scored at 22/30, at the cutoff for increased falls risk. Pt will benefit from continued skilled PT for high level balance training.   (Minutes billed as physical performance test): 10

## 2025-07-25 NOTE — PROGRESS NOTES
"[facility]   General Surgery Progress Note           Assessment and Plan:   Assessment:   HIDA negative for cholecystitis. Suspect mild gallbladder wall thickening was just from adjacent liver inflammation.      Plan:   - Diet as tolerated  - No plans for cholecystectomy unless he develops abdominal pain. General surgery will sign off.         Interval History:   Chart check.  0/10 pain scores and tolerating diet.         Physical Exam:   Blood pressure 127/77, pulse 86, temperature 98.1  F (36.7  C), temperature source Oral, resp. rate 16, height 1.778 m (5' 10\"), weight 79 kg (174 lb 2.6 oz), SpO2 96%.    I/O last 3 completed shifts:  In: 200 [P.O.:200]  Out: 225 [Urine:225]         Data:     Recent Labs   Lab 07/25/25  0904 07/24/25  0718 07/23/25  0636   WBC 7.3 6.4 7.0   HGB 11.3* 9.8* 9.5*   HCT 31.6* 28.2* 27.0*   * 109* 108*    98* 84*     Recent Labs   Lab 07/25/25  0904 07/24/25  0718 07/23/25  1631 07/23/25  0636   * 134*  --  130*   POTASSIUM 4.1 3.9 3.7 3.2*   CHLORIDE 97* 98  --  95*   CO2 23 25  --  23   ANIONGAP 12 11  --  12   * 107*  --  97   BUN 11.1 8.8  --  7.3   CR 0.66* 0.73  --  0.66*   GFRESTIMATED >90 >90  --  >90   RACIEL 9.6 9.5  --  8.7*   MAG 1.4* 1.7 2.0 1.4*   PHOS 3.3 3.9  --  2.1*   PROTTOTAL 8.1 7.3  --  7.1   ALBUMIN 3.2* 3.1*  --  3.2*   BILITOTAL 6.7* 6.9*  --  5.8*   ALKPHOS 116 105  --  111   AST 92* 109*  --  102*   ALT 24 22  --  21       Yadira Ngo MD        "

## 2025-07-25 NOTE — PLAN OF CARE
"  End of Shift Summary  For vital signs and complete assessments, please see documentation flowsheets.     Pertinent assessments: Pt A&O x4. LS clear in all quadrants. CIWAs negative. VSS, Denies pain, BS present, denies N/V. LBM 7/24/2025.   Major Shift Events: NPO @midnight. Treatment Plan: HIDA scan tomorrow, zosyn, lactulose, prednisolone  Bedside Nurse: Phylicia Dumont Studentt.           Problem: Adult Inpatient Plan of Care  Goal: Plan of Care Review  Description: The Plan of Care Review/Shift note should be completed every shift.  The Outcome Evaluation is a brief statement about your assessment that the patient is improving, declining, or no change.  This information will be displayed automatically on your shift  note.  Outcome: Progressing  Flowsheets (Taken 7/24/2025 2312)  Outcome Evaluation: Patient was very A&Ox 4 throughout the shift. Stated \" I need to hydrate more because I will be NPO at midnight\".  Plan of Care Reviewed With: patient  Overall Patient Progress: improving  Goal: Patient-Specific Goal (Individualized)  Description: You can add care plan individualizations to a care plan. Examples of Individualization might be:  \"Parent requests to be called daily at 9am for status\", \"I have a hard time hearing out of my right ear\", or \"Do not touch me to wake me up as it startles  me\".  Outcome: Progressing  Goal: Absence of Hospital-Acquired Illness or Injury  Outcome: Progressing  Intervention: Identify and Manage Fall Risk  Recent Flowsheet Documentation  Taken 7/24/2025 2030 by Phylicia Dumont  Safety Promotion/Fall Prevention:   activity supervised   assistive device/personal items within reach   clutter free environment maintained   increase visualization of patient   lighting adjusted   nonskid shoes/slippers when out of bed   safety round/check completed   supervised activity   treat reversible contributory factors   treat underlying cause  Intervention: Prevent Skin " Injury  Recent Flowsheet Documentation  Taken 7/24/2025 2030 by Phylicia Dumont  Skin Protection: adhesive use limited  Intervention: Prevent Infection  Recent Flowsheet Documentation  Taken 7/24/2025 2030 by Phylicia Dumont  Infection Prevention:   cohorting utilized   hand hygiene promoted   personal protective equipment utilized   single patient room provided  Goal: Optimal Comfort and Wellbeing  Outcome: Progressing  Goal: Readiness for Transition of Care  Outcome: Progressing     Problem: Fall Injury Risk  Goal: Absence of Fall and Fall-Related Injury  Outcome: Progressing  Intervention: Identify and Manage Contributors  Recent Flowsheet Documentation  Taken 7/24/2025 2030 by Phylicia Dumont  Medication Review/Management: medications reviewed  Intervention: Promote Injury-Free Environment  Recent Flowsheet Documentation  Taken 7/24/2025 2030 by Phylicia Dumont  Safety Promotion/Fall Prevention:   activity supervised   assistive device/personal items within reach   clutter free environment maintained   increase visualization of patient   lighting adjusted   nonskid shoes/slippers when out of bed   safety round/check completed   supervised activity   treat reversible contributory factors   treat underlying cause     Problem: Alcohol Withdrawal  Goal: Alcohol Withdrawal Symptom Control  Outcome: Progressing  Intervention: Minimize or Manage Alcohol Withdrawal Symptoms  Recent Flowsheet Documentation  Taken 7/24/2025 2030 by Phylicia Dumont  Sensory Stimulation Regulation:   quiet environment promoted   care clustered   television on  Goal: Optimal Neurologic Function  Outcome: Progressing  Intervention: Minimize or Manage Acute Neurologic Symptoms  Recent Flowsheet Documentation  Taken 7/24/2025 2030 by Phylicia Dumont  Sensory Stimulation Regulation:   quiet environment promoted   care clustered   television on  Goal: Readiness for Change Identified  Outcome: Progressing    "    Goal Outcome Evaluation:      Plan of Care Reviewed With: patient    Overall Patient Progress: improvingOverall Patient Progress: improving    Outcome Evaluation: Patient was very A&Ox 4 throughout the shift. Stated \" I need to hydrate more because I will be NPO at midnight\".          "

## 2025-07-25 NOTE — PLAN OF CARE
"End of Shift Summary  For vital signs and complete assessments, please see documentation flowsheets.     Pertinent assessments:   Pt A&Ox4. Forgetful. CIWA score 0. VSS on RA. Denies pain, nausea, SOB. Up SBA.     Major Shift Events: HIDA scan done. Mag replaced, recheck WNL.    Treatment Plan: lactulose, prednisolone. Monitor for fevers. GI following. Potential discharge tomorrow.      Problem: Adult Inpatient Plan of Care  Goal: Plan of Care Review  Description: The Plan of Care Review/Shift note should be completed every shift.  The Outcome Evaluation is a brief statement about your assessment that the patient is improving, declining, or no change.  This information will be displayed automatically on your shift  note.  Outcome: Progressing  Flowsheets (Taken 7/25/2025 1829)  Outcome Evaluation: patient up SBA, denies pain, A&Ox4, forgetful  Plan of Care Reviewed With: patient  Overall Patient Progress: improving  Goal: Patient-Specific Goal (Individualized)  Description: You can add care plan individualizations to a care plan. Examples of Individualization might be:  \"Parent requests to be called daily at 9am for status\", \"I have a hard time hearing out of my right ear\", or \"Do not touch me to wake me up as it startles  me\".  Outcome: Progressing  Goal: Absence of Hospital-Acquired Illness or Injury  Outcome: Progressing  Intervention: Identify and Manage Fall Risk  Recent Flowsheet Documentation  Taken 7/25/2025 1011 by Kat Nino RN  Safety Promotion/Fall Prevention:   activity supervised   safety round/check completed   nonskid shoes/slippers when out of bed   patient and family education  Goal: Optimal Comfort and Wellbeing  Outcome: Progressing  Goal: Readiness for Transition of Care  Outcome: Progressing     Problem: Fall Injury Risk  Goal: Absence of Fall and Fall-Related Injury  Outcome: Progressing  Intervention: Identify and Manage Contributors  Recent Flowsheet Documentation  Taken 7/25/2025 " 1011 by Kat Nino, RN  Medication Review/Management: medications reviewed  Intervention: Promote Injury-Free Environment  Recent Flowsheet Documentation  Taken 7/25/2025 1011 by Kat Nino, RN  Safety Promotion/Fall Prevention:   activity supervised   safety round/check completed   nonskid shoes/slippers when out of bed   patient and family education     Problem: Alcohol Withdrawal  Goal: Alcohol Withdrawal Symptom Control  Outcome: Progressing  Goal: Optimal Neurologic Function  Outcome: Progressing  Goal: Readiness for Change Identified  Outcome: Progressing   Goal Outcome Evaluation:      Plan of Care Reviewed With: patient    Overall Patient Progress: improvingOverall Patient Progress: improving    Outcome Evaluation: patient up SBA, denies pain, A&Ox4, forgetful

## 2025-07-26 ENCOUNTER — HEALTH MAINTENANCE LETTER (OUTPATIENT)
Age: 50
End: 2025-07-26

## 2025-07-26 ENCOUNTER — APPOINTMENT (OUTPATIENT)
Dept: PHYSICAL THERAPY | Facility: CLINIC | Age: 50
End: 2025-07-26
Payer: COMMERCIAL

## 2025-07-26 VITALS
BODY MASS INDEX: 24.93 KG/M2 | HEIGHT: 70 IN | DIASTOLIC BLOOD PRESSURE: 87 MMHG | SYSTOLIC BLOOD PRESSURE: 152 MMHG | OXYGEN SATURATION: 97 % | WEIGHT: 174.16 LBS | RESPIRATION RATE: 16 BRPM | HEART RATE: 67 BPM | TEMPERATURE: 98 F

## 2025-07-26 LAB
ALBUMIN SERPL BCG-MCNC: 3 G/DL (ref 3.5–5.2)
ALP SERPL-CCNC: 105 U/L (ref 40–150)
ALT SERPL W P-5'-P-CCNC: 23 U/L (ref 0–70)
ANION GAP SERPL CALCULATED.3IONS-SCNC: 12 MMOL/L (ref 7–15)
AST SERPL W P-5'-P-CCNC: 77 U/L (ref 0–45)
BILIRUB SERPL-MCNC: 4.5 MG/DL
BUN SERPL-MCNC: 11.8 MG/DL (ref 6–20)
CALCIUM SERPL-MCNC: 9.1 MG/DL (ref 8.8–10.4)
CHLORIDE SERPL-SCNC: 98 MMOL/L (ref 98–107)
CREAT SERPL-MCNC: 0.62 MG/DL (ref 0.67–1.17)
EGFRCR SERPLBLD CKD-EPI 2021: >90 ML/MIN/1.73M2
ERYTHROCYTE [DISTWIDTH] IN BLOOD BY AUTOMATED COUNT: 14.6 % (ref 10–15)
GLUCOSE SERPL-MCNC: 94 MG/DL (ref 70–99)
HCO3 SERPL-SCNC: 24 MMOL/L (ref 22–29)
HCT VFR BLD AUTO: 29.1 % (ref 40–53)
HGB BLD-MCNC: 10.3 G/DL (ref 13.3–17.7)
INR PPP: 2.01 (ref 0.85–1.15)
MAGNESIUM SERPL-MCNC: 1.7 MG/DL (ref 1.7–2.3)
MCH RBC QN AUTO: 37.7 PG (ref 26.5–33)
MCHC RBC AUTO-ENTMCNC: 35.4 G/DL (ref 31.5–36.5)
MCV RBC AUTO: 107 FL (ref 78–100)
PHOSPHATE SERPL-MCNC: 3.1 MG/DL (ref 2.5–4.5)
PLATELET # BLD AUTO: 158 10E3/UL (ref 150–450)
POTASSIUM SERPL-SCNC: 3.6 MMOL/L (ref 3.4–5.3)
PROT SERPL-MCNC: 7.5 G/DL (ref 6.4–8.3)
PROTHROMBIN TIME: 22.6 SECONDS (ref 11.8–14.8)
RBC # BLD AUTO: 2.73 10E6/UL (ref 4.4–5.9)
SODIUM SERPL-SCNC: 134 MMOL/L (ref 135–145)
WBC # BLD AUTO: 6.8 10E3/UL (ref 4–11)

## 2025-07-26 PROCEDURE — 250N000012 HC RX MED GY IP 250 OP 636 PS 637: Performed by: STUDENT IN AN ORGANIZED HEALTH CARE EDUCATION/TRAINING PROGRAM

## 2025-07-26 PROCEDURE — 84100 ASSAY OF PHOSPHORUS: CPT | Performed by: INTERNAL MEDICINE

## 2025-07-26 PROCEDURE — 250N000011 HC RX IP 250 OP 636: Performed by: INTERNAL MEDICINE

## 2025-07-26 PROCEDURE — 36415 COLL VENOUS BLD VENIPUNCTURE: CPT | Performed by: HOSPITALIST

## 2025-07-26 PROCEDURE — 82310 ASSAY OF CALCIUM: CPT | Performed by: HOSPITALIST

## 2025-07-26 PROCEDURE — 250N000013 HC RX MED GY IP 250 OP 250 PS 637: Performed by: INTERNAL MEDICINE

## 2025-07-26 PROCEDURE — 85610 PROTHROMBIN TIME: CPT | Performed by: HOSPITALIST

## 2025-07-26 PROCEDURE — 83735 ASSAY OF MAGNESIUM: CPT | Performed by: INTERNAL MEDICINE

## 2025-07-26 PROCEDURE — 97530 THERAPEUTIC ACTIVITIES: CPT | Mod: GP

## 2025-07-26 PROCEDURE — 250N000013 HC RX MED GY IP 250 OP 250 PS 637: Performed by: HOSPITALIST

## 2025-07-26 PROCEDURE — 99239 HOSP IP/OBS DSCHRG MGMT >30: CPT | Performed by: HOSPITALIST

## 2025-07-26 PROCEDURE — 85027 COMPLETE CBC AUTOMATED: CPT | Performed by: HOSPITALIST

## 2025-07-26 RX ORDER — PREDNISOLONE SODIUM PHOSPHATE 15 MG/5ML
40 SOLUTION ORAL DAILY
Qty: 93.31 ML | Refills: 0 | Status: SHIPPED | OUTPATIENT
Start: 2025-07-27 | End: 2025-08-03

## 2025-07-26 RX ORDER — LACTULOSE 10 G/15ML
20 SOLUTION ORAL 2 TIMES DAILY
Qty: 420 ML | Refills: 0 | Status: SHIPPED | OUTPATIENT
Start: 2025-07-26 | End: 2025-08-02

## 2025-07-26 RX ADMIN — PANTOPRAZOLE SODIUM 40 MG: 40 INJECTION, POWDER, LYOPHILIZED, FOR SOLUTION INTRAVENOUS at 07:57

## 2025-07-26 RX ADMIN — FOLIC ACID 1 MG: 1 TABLET ORAL at 08:01

## 2025-07-26 RX ADMIN — PREDNISOLONE SODIUM PHOSPHATE 40 MG: 15 SOLUTION ORAL at 08:01

## 2025-07-26 RX ADMIN — LACTULOSE 20 G: 20 SOLUTION ORAL at 08:01

## 2025-07-26 RX ADMIN — Medication 1 TABLET: at 08:01

## 2025-07-26 RX ADMIN — GABAPENTIN 300 MG: 300 CAPSULE ORAL at 08:02

## 2025-07-26 ASSESSMENT — ACTIVITIES OF DAILY LIVING (ADL)
ADLS_ACUITY_SCORE: 37
ADLS_ACUITY_SCORE: 38
ADLS_ACUITY_SCORE: 37
ADLS_ACUITY_SCORE: 37
ADLS_ACUITY_SCORE: 38
ADLS_ACUITY_SCORE: 37
ADLS_ACUITY_SCORE: 38

## 2025-07-26 NOTE — DISCHARGE SUMMARY
Discharge Summary  Hospitalist Service      Carlos A Lopez MRN# [id number]   YOB: 1975 Age: 49 year old     Date of Admission:  7/20/2025  Date of Discharge:  7/26/2025  Admitting Physician:  Jordon Schaefer MD  Discharge Physician: Christen Taylor MD   Discharging Service: Hospitalist Service     Primary Provider: No Ref-Primary, Physician  Primary Care Physician Phone Number: None         Discharge Diagnoses/Problem Oriented Hospital Course (Providers):      Discharge Diagnoses   Visual hallucinations  Alcohol withdrawal, dependence  Cirrhosis,new diagnosis with decompensation   Splenomegaly  Alcoholic hepatitis  Fever in the evening?  Multiple metabolic derangements  Prolonged Qtc  Normocytic anemia and thrombocytopenia   Ataxic  Htn  Hospital Course     Summary of Stay: Carlos A Lopez is a 49 year old male with a history of htn, hx of colon ca s/p right hemicolectomy followed by chemo, alcohol abuse and dependence admitted on 7/20/2025 with visual hallucinations      Here in the ED 2 days pta and noted to have a bili 5 and CT CAP with e/o cirrhosis with portal htn.  Incidentally noted to have cholelithiasis and GBW thickening without e/o cholecystitis. Spouse noted sclera icterus about a week ago. BAL at the visit 0.23 but denied significant drinking.  Discharged home with prn diazepam.  He did not have any alcohol and then started having visual hallucinations that seem real to him.He described seeing multiple women performing acrobatics including a trapeze in his home before coming in and then a man came and sat down by him which he thought was weird.      He also reports unintentional weight loss of 40 # over the last 6 months      VS with hypertension/tachycardia/and mildly elevated temp  CMP with hyponatremia 126, K 3.2, Mag, 1.0, phos 2.3.  LFTs  AST//24, bili 6.1.  INR  2.0  CBC macrocytic anemia 9.9, platelets low at 66     Problem List:      Visual hallucinations  Seem  most consistent with alcohol withdrawal although hepatic encephalopathy could be contributing.   Improving     Alcohol withdrawal, dependence  Improved with alcohol withdrawal protocol  -remains on gabapentin taper   -continue with CIWA driven  benzo's and hasn't needed any benzo  -chem dep eval     Cirrhosis,new diagnosis with decompensation   Splenomegaly  Alcoholic hepatitis  Likely related to alcohol use, but had SBP serologies consistent with previous infection  - Started on steroids by GI Harriett score in 7 days, Gastroenterology will arrange follow-up for this when discharged  - Monitor MELD scores daily     Fever in the evening?  No localizing symptoms other than mild right upper quadrant pain, CT abdomen pelvis in the ED showed gallbladder wall thickening however ultrasound minimal wall gallbladder thickening  -Otherwise no other symptoms, CT abdomen pelvis reviewed no ascites, UA on admission negative  - Blood cultures no growth so far, HIDA scan negative, UA negative  - I will stop the Zosyn and monitor     Multiple metabolic derangements  Prolonged QTc  Hyponatremia 2nd etoh and improving appropriately - stopped IVF  Hypomag and hypoK replaced and rechecked  QTc improving but remains prolonged at 483     Normocytic anemia and thrombocytopenia   2* alcohol   No indication for transfusion      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 Adenocarcinoma not excluded. If more recent comparison images are unavailable, six-month follow-up recommended per Fleischner Society guidelines               Code Status:      Full Code         Important Results:                  Pending Results:        Unresulted Labs Ordered in the Past 30 Days of this Admission       Date and Time Order Name Status Description    7/24/2025  "10:53 AM Blood Culture Peripheral blood (BC) Hand, Right Preliminary     7/24/2025 10:53 AM Blood Culture Peripheral blood (BC) Arm, Left Preliminary                  Discharge Instructions and Follow-Up:      Follow-up Appointments     Hospital to Primary Care - Establish PCP Referral      Please be aware that coverage of these services is subject to the terms   and limitations of your health insurance plan.  Call member services at   your health plan with any benefit or coverage questions.    Schedule Primary Care visit within: 14 Days                Discharge Disposition:        Discharged to home          Discharge Medications:        Current Discharge Medication List        START taking these medications    Details   lactulose (CHRONULAC) 10 GM/15ML solution Take 30 mLs (20 g) by mouth 2 times daily for 7 days.  Qty: 420 mL, Refills: 0    Associated Diagnoses: Alcoholic cirrhosis of liver without ascites (H)      prednisoLONE (ORAPRED) 15 MG/5ML solution Take 13.33 mLs (40 mg) by mouth daily for 7 days.  Qty: 93.31 mL, Refills: 0    Associated Diagnoses: Alcoholic cirrhosis of liver without ascites (H)           STOP taking these medications       diazepam (VALIUM) 5 MG tablet Comments:   Reason for Stopping:         magnesium gluconate (MAGONATE) 500 (27 Mg) MG tablet Comments:   Reason for Stopping:         ondansetron (ZOFRAN ODT) 4 MG ODT tab Comments:   Reason for Stopping:         potassium chloride ananya ER (KLOR-CON M20) 20 MEQ CR tablet Comments:   Reason for Stopping:                    Allergies:       No Known Allergies         Consultations This Hospital Stay:        Consultation during this admission received from gastroenterology          Condition and Physical Exam on Discharge:        Discharge condition: Stable   Discharge vitals: Blood pressure (!) 152/87, pulse 67, temperature 98  F (36.7  C), temperature source Oral, resp. rate 16, height 1.778 m (5' 10\"), weight 79 kg (174 lb 2.6 oz), SpO2 " "97%.   General: Pt in NAD, normal appearance  HEENT: OP clear MMM, no JVD  Lungs: Clear to Auscultation Bilateral, normal breathing  without accessory muscle usage, no wheezing, rhonchi or crackles  Cardiac: +S1, S2, RRR, no MRG, no edema  Abdominal: normal bowel sounds, NT/ND, no hepatosplenomegaly  Skin: warm, dry, normal turgor, no rash  Psyche: A& O x3, appropriate affect            Discharge Orders for Skilled Facility (from Discharge Orders):        After Care Instructions       Activity      Your activity upon discharge: activity as tolerated        Diet      Follow this diet upon discharge: Current Diet:Orders Placed This Encounter      Snacks/Supplements Adult: Ensure Plus High Protein; Between Meals      Regular Diet Adult                     Rehab orders for Skilled Facility (from Discharge Orders):      Referrals     Future Labs/Procedures    Physical Therapy  Referral     Process Instructions:    If ordering DME, please utilize the DME ordering process. If ordering   services for Hand Therapy, please utiize order: Occupational Therapy    Referral [[id number]] and select \"Hand Therapy\" under Specialty   Services.    Comments:    Please be aware that coverage of these services is subject to the terms   and limitations of your health insurance plan.  Call member services at   your health plan with any benefit or coverage questions.  [facility] will call you to coordinate your care as prescribed by   your provider. If you don't hear from a representative within 2 business   days, please call [phone].    Hospital to Primary Care - Establish PCP Referral     Comments:    Please be aware that coverage of these services is subject to the terms   and limitations of your health insurance plan.  Call member services at   your health plan with any benefit or coverage questions.             Discharge Time:      Greater than 30 minutes.        Image Results From This Hospital Stay (For " Non-EPIC Providers):        Results for orders placed or performed during the hospital encounter of 07/20/25   US Abdomen Limited    Narrative    EXAM: US ABDOMEN LIMITED  LOCATION: [facility]  DATE: 7/24/2025    INDICATION: Fever, right upper quadrant tenderness  COMPARISON: CT and right upper quadrant ultrasound 7/18/2025  TECHNIQUE: Limited abdominal ultrasound.    FINDINGS:    GALLBLADDER: Gallstones in a nonpathologically distended gallbladder. Mild wall edema measuring up to 4 mm in thickness. No pericholecystic fluid. Negative sonographic Dennis's sign.    BILE DUCTS: No intrahepatic biliary dilatation. The common duct measures 3 mm.    LIVER: Nodular contour of liver with heterogeneous echotexture. The portal vein is patent with flow in the normal direction.    RIGHT KIDNEY: No hydronephrosis.    PANCREAS: The visualized portions are normal.    No ascites.      Impression    IMPRESSION:  1.  Cholelithiasis with some mild gallbladder wall edema but no additional evidence of acute cholecystitis or biliary obstruction.  2.  Morphologic changes of liver disease/cirrhosis. No ascites.       NM HepatOBiliary Scan    Narrative    EXAM: NM HEPATOBILIARY SCAN  LOCATION: [facility]  DATE: 7/25/2025    INDICATION: Ruq pain and fever, evaluate for gallbladder filling, no EF please  COMPARISON: Right upper quadrant ultrasound 07/24/2025.  TECHNIQUE: 6.5 mCi of Tc-99m mebrofenin, IV. Anterior planar imaging of the abdomen.     FINDINGS: Normal radionuclide activity in liver, gallbladder, bile ducts, and small bowel. No evidence of cystic or common duct obstruction. Mild persistence of blood pool activity compatible with known chronic liver disease.      Impression    IMPRESSION:     Negative for acute cholecystitis.           Most Recent Lab Results In EPIC (For Non-EPIC Providers):    Most Recent 3 CBC's:  Recent Labs   Lab Test 07/26/25  0631 07/25/25  0904 07/24/25  0718    WBC 6.8 7.3 6.4   HGB 10.3* 11.3* 9.8*   * 107* 109*    160 98*      Most Recent 3 BMP's:  Recent Labs   Lab Test 07/26/25  0631 07/25/25  0904 07/24/25  0718   * 132* 134*   POTASSIUM 3.6 4.1 3.9   CHLORIDE 98 97* 98   CO2 24 23 25   BUN 11.8 11.1 8.8   CR 0.62* 0.66* 0.73   ANIONGAP 12 12 11   RACIEL 9.1 9.6 9.5   GLC 94 116* 107*     Most Recent 3 Troponin's:No lab results found.  Most Recent 3 INR's:  Recent Labs   Lab Test 07/26/25  0631 07/25/25  0904 07/24/25  0718   INR 2.01* 2.13* 2.35*     Most Recent 2 LFT's:  Recent Labs   Lab Test 07/26/25  0631 07/25/25  0904   AST 77* 92*   ALT 23 24   ALKPHOS 105 116   BILITOTAL 4.5* 6.7*     Most Recent Cholesterol Panel:No lab results found.  Most Recent 6 Bacteria Isolates From Any Culture (See EPIC Reports for Culture Details):No lab results found.  Most Recent TSH, T4 and HgbA1c:  Recent Labs   Lab Test 03/08/24  0758   TSH 5.67*   T4 1.28        no

## 2025-07-26 NOTE — PLAN OF CARE
Physical Therapy Discharge Summary    Reason for therapy discharge:    All goals and outcomes met, no further needs identified.    Progress towards therapy goal(s). See goals on Care Plan in [facility] electronic health record for goal details.  Goals met    Therapy recommendation(s):    Continued therapy is recommended.  Rationale/Recommendations:  Recommend OP PT to progress strength and balance deficits.

## 2025-07-26 NOTE — PROGRESS NOTES
"GASTROENTEROLOGY PROGRESS NOTE     SUBJECTIVE:  Sitting in chair, dressed, ready for d/c     OBJECTIVE:  BP (!) 152/87 (BP Location: Right arm)   Pulse 67   Temp 98  F (36.7  C) (Oral)   Resp 16   Ht 1.778 m (5' 10\")   Wt 79 kg (174 lb 2.6 oz)   SpO2 97%   BMI 24.99 kg/m    Temp (24hrs), Av.9  F (36.6  C), Min:97.5  F (36.4  C), Max:98.2  F (36.8  C)    No data found.    Intake/Output Summary (Last 24 hours) at 2025 0957  Last data filed at 2025 1627  Gross per 24 hour   Intake 200 ml   Output 225 ml   Net -25 ml        PHYSICAL EXAM  GENERAL: No obvious distress  EYES: Scleral icterus  NEUROLOGIC: Alert and oriented. No asterixis.   PSYCHIATRIC: Normal affect     Additional Comments:  ROS, FH, SH: See initial GI consult for details.     I have reviewed the patient's new clinical lab results:     Recent Labs   Lab Test 25  0631 25  0904 25  0718   WBC 6.8 7.3 6.4   HGB 10.3* 11.3* 9.8*   * 107* 109*    160 98*   INR 2.01* 2.13* 2.35*     Recent Labs   Lab Test 25  0631 25  0904 25  0718   POTASSIUM 3.6 4.1 3.9   CHLORIDE 98 97* 98   CO2    BUN 11.8 11.1 8.8   ANIONGAP 12 12 11     Recent Labs   Lab Test 25  0631 25  0904 25  1626 25  0718 25  0706 25  0023 25  2330 25  2131 25  1953   ALBUMIN 3.0* 3.2*  --  3.1*   < >  --  3.8  --  3.9   BILITOTAL 4.5* 6.7*  --  6.9*   < >  --  7.1*  --  5.1*   ALT 23 24  --  22   < >  --  28  --  27   AST 77* 92*  --  109*   < >  --  176*  --  200*   PROTEIN  --   --  Negative  --   --  Negative  --  Negative  --    LIPASE  --   --   --   --   --   --  120*  --  80*    < > = values in this interval not displayed.     MELD 3.0: 22 at 2025  6:31 AM  MELD-Na: 22 at 2025  6:31 AM  Calculated from:  Serum Creatinine: 0.62 mg/dL (Using min of 1 mg/dL) at 2025  6:31 AM  Serum Sodium: 134 mmol/L at 2025  6:31 AM  Total Bilirubin: 4.5 mg/dL " at 7/26/2025  6:31 AM  Serum Albumin: 3 g/dL at 7/26/2025  6:31 AM  INR(ratio): 2.01 at 7/26/2025  6:31 AM  Age at listing (hypothetical): 49 years  Sex: Male at 7/26/2025  6:31 AM          ASSESSMENT:     Acute alcohol hepatitis. Started steroids on 7/24. Will need to calculate Lille score in on 7/31. Bili improving.  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 CT with morphologic changes of cirrhosis and portal hypertension. Suspect alcohol as cause but checked viral hepatitis serologies.  Hepatitis B serologies consistent with recovery from prior hep B infection.   Confusion. Possible hepatic encephalopathy vs alcohol withdrawal. Ammonia mildly elevated at 61 on admission. Patient received one dose of lactulose. Confusion continues to improve.        PLAN:  - Continue prednisolone 40 mg daily, likely for a total of 28 days  - Calculate Lille score in 1 week (7/31/2025). Patient has appointment with new PCP that day and can have lille labs drawn  - Thiamine and folic acid daily as outpateint  - Discussed with patient's wife today, they wish to follow up with [facility], she will call for appt on Monday.  Has has appt with PCP 7/31/25 and will have labs drawn, that provider will have to decide if Lille score indicates he should continue steroids or not, given improvement in bili today, I expect he should continue for 28 days  - discussed importance of chem dep therapy and lifelong  sobriety    Gi will sign off, patient will [facility].      Total time: 25 minutes of total time was spent providing patient care, including patient evaluation, reviewing documentation/test results, and .     Ava Teresa MD  [facility]  [phone]'

## 2025-07-26 NOTE — PLAN OF CARE
"End of Shift Summary  For vital signs and complete assessments, please see documentation flowsheets.     Pertinent assessments:   Pt A&Ox4. Forgetful. CIWA score 0. VSS on RA. Denies pain, nausea, SOB. Up ind.    Discharge Note  Patient discharged to: home   Accompanied by: wife  Prescriptions: filled and sent with pt  Belongings reviewed and sent with patient.   Discharge instructions given to patient, questions answered.       Problem: Adult Inpatient Plan of Care  Goal: Plan of Care Review  Description: The Plan of Care Review/Shift note should be completed every shift.  The Outcome Evaluation is a brief statement about your assessment that the patient is improving, declining, or no change.  This information will be displayed automatically on your shift  note.  Outcome: Met  Flowsheets (Taken 7/26/2025 1212)  Outcome Evaluation: up ind, denies pain, nausea, SOB. CIWA 0  Plan of Care Reviewed With: patient  Overall Patient Progress: improving  Goal: Patient-Specific Goal (Individualized)  Description: You can add care plan individualizations to a care plan. Examples of Individualization might be:  \"Parent requests to be called daily at 9am for status\", \"I have a hard time hearing out of my right ear\", or \"Do not touch me to wake me up as it startles  me\".  Outcome: Met  Goal: Absence of Hospital-Acquired Illness or Injury  Outcome: Met  Intervention: Identify and Manage Fall Risk  Recent Flowsheet Documentation  Taken 7/26/2025 0806 by Kat Nino, RN  Safety Promotion/Fall Prevention:   activity supervised   clutter free environment maintained   lighting adjusted   nonskid shoes/slippers when out of bed   patient and family education   safety round/check completed  Goal: Optimal Comfort and Wellbeing  Outcome: Met  Goal: Readiness for Transition of Care  Outcome: Met     Problem: Fall Injury Risk  Goal: Absence of Fall and Fall-Related Injury  Outcome: Met  Intervention: Identify and Manage " Contributors  Recent Flowsheet Documentation  Taken 7/26/2025 0806 by Kat Nino, RN  Medication Review/Management: medications reviewed  Intervention: Promote Injury-Free Environment  Recent Flowsheet Documentation  Taken 7/26/2025 0806 by Kat Nino RN  Safety Promotion/Fall Prevention:   activity supervised   clutter free environment maintained   lighting adjusted   nonskid shoes/slippers when out of bed   patient and family education   safety round/check completed     Problem: Alcohol Withdrawal  Goal: Alcohol Withdrawal Symptom Control  Outcome: Met  Goal: Optimal Neurologic Function  Outcome: Met  Goal: Readiness for Change Identified  Outcome: Met   Goal Outcome Evaluation:      Plan of Care Reviewed With: patient    Overall Patient Progress: improvingOverall Patient Progress: improving    Outcome Evaluation: up ind, denies pain, nausea, SOB. CIWA 0

## 2025-07-26 NOTE — PLAN OF CARE
"End of Shift Summary  For vital signs and complete assessments, please see documentation flowsheets.     Pertinent assessments:   Pt A&Ox4. Forgetful. CIWA score 0. VSS on RA. Denies pain, nausea, SOB. Up SBA.     Major Shift Events: None    Treatment Plan: lactulose, prednisolone. Monitor for fevers. GI following. Potential discharge today.    Problem: Adult Inpatient Plan of Care  Goal: Plan of Care Review  Description: The Plan of Care Review/Shift note should be completed every shift.  The Outcome Evaluation is a brief statement about your assessment that the patient is improving, declining, or no change.  This information will be displayed automatically on your shift  note.  Outcome: Progressing  Goal: Patient-Specific Goal (Individualized)  Description: You can add care plan individualizations to a care plan. Examples of Individualization might be:  \"Parent requests to be called daily at 9am for status\", \"I have a hard time hearing out of my right ear\", or \"Do not touch me to wake me up as it startles  me\".  Outcome: Progressing  Goal: Absence of Hospital-Acquired Illness or Injury  Outcome: Progressing  Intervention: Identify and Manage Fall Risk  Recent Flowsheet Documentation  Taken 7/26/2025 0110 by Dana Can RN  Safety Promotion/Fall Prevention:   activity supervised   clutter free environment maintained   lighting adjusted   nonskid shoes/slippers when out of bed   patient and family education   safety round/check completed  Intervention: Prevent Skin Injury  Recent Flowsheet Documentation  Taken 7/26/2025 0110 by Dana Can RN  Body Position: position changed independently  Intervention: Prevent and Manage VTE (Venous Thromboembolism) Risk  Recent Flowsheet Documentation  Taken 7/26/2025 0110 by Dana Can RN  VTE Prevention/Management: SCDs off (sequential compression devices)  Intervention: Prevent Infection  Recent Flowsheet Documentation  Taken 7/26/2025 0110 by Dana Can, " RN  Infection Prevention:   cohorting utilized   hand hygiene promoted  Goal: Optimal Comfort and Wellbeing  Outcome: Progressing  Goal: Readiness for Transition of Care  Outcome: Progressing     Problem: Fall Injury Risk  Goal: Absence of Fall and Fall-Related Injury  Outcome: Progressing  Intervention: Identify and Manage Contributors  Recent Flowsheet Documentation  Taken 7/26/2025 0110 by Dana Can RN  Medication Review/Management: medications reviewed  Intervention: Promote Injury-Free Environment  Recent Flowsheet Documentation  Taken 7/26/2025 0110 by Dana Can RN  Safety Promotion/Fall Prevention:   activity supervised   clutter free environment maintained   lighting adjusted   nonskid shoes/slippers when out of bed   patient and family education   safety round/check completed     Problem: Alcohol Withdrawal  Goal: Alcohol Withdrawal Symptom Control  Outcome: Progressing  Goal: Optimal Neurologic Function  Outcome: Progressing  Goal: Readiness for Change Identified  Outcome: Progressing     Problem: Adult Inpatient Plan of Care  Goal: Plan of Care Review  Description: The Plan of Care Review/Shift note should be completed every shift.  The Outcome Evaluation is a brief statement about your assessment that the patient is improving, declining, or no change.  This information will be displayed automatically on your shift  note.  7/26/2025 0456 by Dana Can RN  Outcome: Progressing  Flowsheets (Taken 7/26/2025 0456)  Outcome Evaluation: Patient VSS, denies pain, A/Ox4, but forgetful, bed alarm on, CIWA score 0, up SBA  Plan of Care Reviewed With: patient  7/26/2025 0456 by Dana Can RN  Outcome: Progressing  Flowsheets (Taken 7/26/2025 0456)  Outcome Evaluation: Patient VSS, denies pain, A/Ox4, but forgetful, bed alarm on, CIWA score 0, up SBA  Plan of Care Reviewed With: patient  Goal: Patient-Specific Goal (Individualized)  Description: You can add care plan individualizations to a  "care plan. Examples of Individualization might be:  \"Parent requests to be called daily at 9am for status\", \"I have a hard time hearing out of my right ear\", or \"Do not touch me to wake me up as it startles  me\".  7/26/2025 0456 by Dana Can RN  Outcome: Progressing  7/26/2025 0456 by Dana Can RN  Outcome: Progressing  Goal: Absence of Hospital-Acquired Illness or Injury  7/26/2025 0456 by Dana Can RN  Outcome: Progressing  7/26/2025 0456 by Dana Can RN  Outcome: Progressing  Intervention: Identify and Manage Fall Risk  Recent Flowsheet Documentation  Taken 7/26/2025 0110 by Dana Can RN  Safety Promotion/Fall Prevention:   activity supervised   clutter free environment maintained   lighting adjusted   nonskid shoes/slippers when out of bed   patient and family education   safety round/check completed  Intervention: Prevent Skin Injury  Recent Flowsheet Documentation  Taken 7/26/2025 0110 by Dana Can RN  Body Position: position changed independently  Intervention: Prevent and Manage VTE (Venous Thromboembolism) Risk  Recent Flowsheet Documentation  Taken 7/26/2025 0110 by Dana Can RN  VTE Prevention/Management: SCDs off (sequential compression devices)  Intervention: Prevent Infection  Recent Flowsheet Documentation  Taken 7/26/2025 0110 by Dana Can RN  Infection Prevention:   cohorting utilized   hand hygiene promoted  Goal: Optimal Comfort and Wellbeing  7/26/2025 0456 by Dana Can RN  Outcome: Progressing  7/26/2025 0456 by Dana Can RN  Outcome: Progressing  Goal: Readiness for Transition of Care  7/26/2025 0456 by Dana Can RN  Outcome: Progressing  7/26/2025 0456 by Dana Can RN  Outcome: Progressing     Problem: Fall Injury Risk  Goal: Absence of Fall and Fall-Related Injury  7/26/2025 0456 by Dana Can RN  Outcome: Progressing  7/26/2025 0456 by O'Konski, Dana, RN  Outcome: Progressing  Intervention: Identify " and Manage Contributors  Recent Flowsheet Documentation  Taken 7/26/2025 0110 by Dana Can RN  Medication Review/Management: medications reviewed  Intervention: Promote Injury-Free Environment  Recent Flowsheet Documentation  Taken 7/26/2025 0110 by Dana Can RN  Safety Promotion/Fall Prevention:   activity supervised   clutter free environment maintained   lighting adjusted   nonskid shoes/slippers when out of bed   patient and family education   safety round/check completed     Problem: Alcohol Withdrawal  Goal: Alcohol Withdrawal Symptom Control  7/26/2025 0456 by Dana Can RN  Outcome: Progressing  7/26/2025 0456 by Dana Can RN  Outcome: Progressing  Goal: Optimal Neurologic Function  7/26/2025 0456 by Dana Can RN  Outcome: Progressing  7/26/2025 0456 by Dana Can RN  Outcome: Progressing  Goal: Readiness for Change Identified  7/26/2025 0456 by Dana Can RN  Outcome: Progressing  7/26/2025 0456 by Dana Can RN  Outcome: Progressing   Goal Outcome Evaluation:

## 2025-07-28 ENCOUNTER — PATIENT OUTREACH (OUTPATIENT)
Dept: CARE COORDINATION | Facility: CLINIC | Age: 50
End: 2025-07-28
Payer: COMMERCIAL

## 2025-07-29 LAB
ATRIAL RATE - MUSE: 76 BPM
ATRIAL RATE - MUSE: 78 BPM
BACTERIA SPEC CULT: NO GROWTH
BACTERIA SPEC CULT: NO GROWTH
DIASTOLIC BLOOD PRESSURE - MUSE: NORMAL MMHG
DIASTOLIC BLOOD PRESSURE - MUSE: NORMAL MMHG
INTERPRETATION ECG - MUSE: NORMAL
INTERPRETATION ECG - MUSE: NORMAL
P AXIS - MUSE: 48 DEGREES
P AXIS - MUSE: 51 DEGREES
PR INTERVAL - MUSE: 220 MS
PR INTERVAL - MUSE: 236 MS
QRS DURATION - MUSE: 104 MS
QRS DURATION - MUSE: 104 MS
QT - MUSE: 430 MS
QT - MUSE: 436 MS
QTC - MUSE: 483 MS
QTC - MUSE: 497 MS
R AXIS - MUSE: -1 DEGREES
R AXIS - MUSE: -4 DEGREES
SYSTOLIC BLOOD PRESSURE - MUSE: NORMAL MMHG
SYSTOLIC BLOOD PRESSURE - MUSE: NORMAL MMHG
T AXIS - MUSE: 4 DEGREES
T AXIS - MUSE: 5 DEGREES
VENTRICULAR RATE- MUSE: 76 BPM
VENTRICULAR RATE- MUSE: 78 BPM

## 2025-07-30 ENCOUNTER — PATIENT OUTREACH (OUTPATIENT)
Dept: CARE COORDINATION | Facility: CLINIC | Age: 50
End: 2025-07-30
Payer: COMMERCIAL